# Patient Record
Sex: MALE | Race: WHITE | HISPANIC OR LATINO | Employment: FULL TIME | URBAN - METROPOLITAN AREA
[De-identification: names, ages, dates, MRNs, and addresses within clinical notes are randomized per-mention and may not be internally consistent; named-entity substitution may affect disease eponyms.]

---

## 2023-06-20 ENCOUNTER — HOSPITAL ENCOUNTER (INPATIENT)
Facility: MEDICAL CENTER | Age: 48
LOS: 2 days | DRG: 281 | End: 2023-06-23
Attending: STUDENT IN AN ORGANIZED HEALTH CARE EDUCATION/TRAINING PROGRAM | Admitting: STUDENT IN AN ORGANIZED HEALTH CARE EDUCATION/TRAINING PROGRAM
Payer: COMMERCIAL

## 2023-06-20 DIAGNOSIS — I21.4 NSTEMI (NON-ST ELEVATED MYOCARDIAL INFARCTION) (HCC): ICD-10-CM

## 2023-06-20 PROBLEM — R79.89 ELEVATED TROPONIN: Status: ACTIVE | Noted: 2023-06-20

## 2023-06-20 PROCEDURE — G0378 HOSPITAL OBSERVATION PER HR: HCPCS

## 2023-06-20 PROCEDURE — 99291 CRITICAL CARE FIRST HOUR: CPT | Mod: 25 | Performed by: STUDENT IN AN ORGANIZED HEALTH CARE EDUCATION/TRAINING PROGRAM

## 2023-06-20 PROCEDURE — 99406 BEHAV CHNG SMOKING 3-10 MIN: CPT | Performed by: STUDENT IN AN ORGANIZED HEALTH CARE EDUCATION/TRAINING PROGRAM

## 2023-06-21 ENCOUNTER — APPOINTMENT (OUTPATIENT)
Dept: CARDIOLOGY | Facility: MEDICAL CENTER | Age: 48
DRG: 281 | End: 2023-06-21
Attending: HOSPITALIST
Payer: COMMERCIAL

## 2023-06-21 ENCOUNTER — APPOINTMENT (OUTPATIENT)
Dept: CARDIOLOGY | Facility: MEDICAL CENTER | Age: 48
DRG: 281 | End: 2023-06-21
Attending: NURSE PRACTITIONER
Payer: COMMERCIAL

## 2023-06-21 PROBLEM — I47.10 SVT (SUPRAVENTRICULAR TACHYCARDIA) (HCC): Status: ACTIVE | Noted: 2023-06-21

## 2023-06-21 PROBLEM — R79.89 ELEVATED TROPONIN: Status: RESOLVED | Noted: 2023-06-20 | Resolved: 2023-06-21

## 2023-06-21 PROBLEM — R74.01 ELEVATED AST (SGOT): Status: ACTIVE | Noted: 2023-06-21

## 2023-06-21 PROBLEM — I10 PRIMARY HYPERTENSION: Status: ACTIVE | Noted: 2023-06-21

## 2023-06-21 PROBLEM — N17.9 AKI (ACUTE KIDNEY INJURY) (HCC): Status: ACTIVE | Noted: 2023-06-21

## 2023-06-21 PROBLEM — F17.210 CONTINUOUS DEPENDENCE ON CIGARETTE SMOKING: Status: ACTIVE | Noted: 2023-06-21

## 2023-06-21 PROBLEM — I21.4 NSTEMI (NON-ST ELEVATED MYOCARDIAL INFARCTION) (HCC): Status: ACTIVE | Noted: 2023-06-21

## 2023-06-21 PROBLEM — R94.4 DECREASED GFR: Status: ACTIVE | Noted: 2023-06-21

## 2023-06-21 LAB
ALBUMIN SERPL BCP-MCNC: 3.7 G/DL (ref 3.2–4.9)
ALBUMIN/GLOB SERPL: 1.4 G/DL
ALP SERPL-CCNC: 99 U/L (ref 30–99)
ALT SERPL-CCNC: 29 U/L (ref 2–50)
ANION GAP SERPL CALC-SCNC: 18 MMOL/L (ref 7–16)
APTT PPP: 30.1 SEC (ref 24.7–36)
AST SERPL-CCNC: 67 U/L (ref 12–45)
BASOPHILS # BLD AUTO: 1 % (ref 0–1.8)
BASOPHILS # BLD: 0.1 K/UL (ref 0–0.12)
BILIRUB SERPL-MCNC: 0.4 MG/DL (ref 0.1–1.5)
BUN SERPL-MCNC: 20 MG/DL (ref 8–22)
CALCIUM ALBUM COR SERPL-MCNC: 8.6 MG/DL (ref 8.5–10.5)
CALCIUM SERPL-MCNC: 8.4 MG/DL (ref 8.5–10.5)
CHLORIDE SERPL-SCNC: 105 MMOL/L (ref 96–112)
CHOLEST SERPL-MCNC: 207 MG/DL (ref 100–199)
CO2 SERPL-SCNC: 20 MMOL/L (ref 20–33)
CREAT SERPL-MCNC: 1.53 MG/DL (ref 0.5–1.4)
EKG IMPRESSION: NORMAL
EOSINOPHIL # BLD AUTO: 0.07 K/UL (ref 0–0.51)
EOSINOPHIL NFR BLD: 0.7 % (ref 0–6.9)
ERYTHROCYTE [DISTWIDTH] IN BLOOD BY AUTOMATED COUNT: 48.5 FL (ref 35.9–50)
GFR SERPLBLD CREATININE-BSD FMLA CKD-EPI: 55 ML/MIN/1.73 M 2
GLOBULIN SER CALC-MCNC: 2.6 G/DL (ref 1.9–3.5)
GLUCOSE SERPL-MCNC: 86 MG/DL (ref 65–99)
HCT VFR BLD AUTO: 44 % (ref 42–52)
HDLC SERPL-MCNC: 41 MG/DL
HGB BLD-MCNC: 14.3 G/DL (ref 14–18)
IMM GRANULOCYTES # BLD AUTO: 0.03 K/UL (ref 0–0.11)
IMM GRANULOCYTES NFR BLD AUTO: 0.3 % (ref 0–0.9)
INR PPP: 1.02 (ref 0.87–1.13)
LACTATE SERPL-SCNC: 1.1 MMOL/L (ref 0.5–2)
LDLC SERPL CALC-MCNC: 137 MG/DL
LV EJECT FRACT  99904: 60
LV EJECT FRACT MOD 2C 99903: 54.03
LV EJECT FRACT MOD 4C 99902: 56.95
LV EJECT FRACT MOD BP 99901: 55.23
LYMPHOCYTES # BLD AUTO: 2.31 K/UL (ref 1–4.8)
LYMPHOCYTES NFR BLD: 24 % (ref 22–41)
MAGNESIUM SERPL-MCNC: 1.9 MG/DL (ref 1.5–2.5)
MCH RBC QN AUTO: 31.8 PG (ref 27–33)
MCHC RBC AUTO-ENTMCNC: 32.5 G/DL (ref 32.3–36.5)
MCV RBC AUTO: 98 FL (ref 81.4–97.8)
MONOCYTES # BLD AUTO: 0.66 K/UL (ref 0–0.85)
MONOCYTES NFR BLD AUTO: 6.8 % (ref 0–13.4)
NEUTROPHILS # BLD AUTO: 6.47 K/UL (ref 1.82–7.42)
NEUTROPHILS NFR BLD: 67.2 % (ref 44–72)
NRBC # BLD AUTO: 0 K/UL
NRBC BLD-RTO: 0 /100 WBC (ref 0–0.2)
PLATELET # BLD AUTO: 200 K/UL (ref 164–446)
PMV BLD AUTO: 10.6 FL (ref 9–12.9)
POTASSIUM SERPL-SCNC: 3.6 MMOL/L (ref 3.6–5.5)
PROT SERPL-MCNC: 6.3 G/DL (ref 6–8.2)
PROTHROMBIN TIME: 13.3 SEC (ref 12–14.6)
RBC # BLD AUTO: 4.49 M/UL (ref 4.7–6.1)
SODIUM SERPL-SCNC: 143 MMOL/L (ref 135–145)
T4 FREE SERPL-MCNC: 1.18 NG/DL (ref 0.93–1.7)
TRIGL SERPL-MCNC: 143 MG/DL (ref 0–149)
TROPONIN T SERPL-MCNC: 372 NG/L (ref 6–19)
TROPONIN T SERPL-MCNC: 578 NG/L (ref 6–19)
TROPONIN T SERPL-MCNC: 624 NG/L (ref 6–19)
TROPONIN T SERPL-MCNC: 688 NG/L (ref 6–19)
TSH SERPL DL<=0.005 MIU/L-ACNC: 1.6 UIU/ML (ref 0.38–5.33)
UFH PPP CHRO-ACNC: 0.2 IU/ML
UFH PPP CHRO-ACNC: 0.31 IU/ML
UFH PPP CHRO-ACNC: <0.1 IU/ML
UFH PPP CHRO-ACNC: <0.1 IU/ML
WBC # BLD AUTO: 9.6 K/UL (ref 4.8–10.8)

## 2023-06-21 PROCEDURE — 36415 COLL VENOUS BLD VENIPUNCTURE: CPT

## 2023-06-21 PROCEDURE — 700102 HCHG RX REV CODE 250 W/ 637 OVERRIDE(OP): Performed by: HOSPITALIST

## 2023-06-21 PROCEDURE — 80061 LIPID PANEL: CPT

## 2023-06-21 PROCEDURE — A9270 NON-COVERED ITEM OR SERVICE: HCPCS | Performed by: INTERNAL MEDICINE

## 2023-06-21 PROCEDURE — 85520 HEPARIN ASSAY: CPT | Mod: 91

## 2023-06-21 PROCEDURE — 93005 ELECTROCARDIOGRAM TRACING: CPT | Performed by: HOSPITALIST

## 2023-06-21 PROCEDURE — 85025 COMPLETE CBC W/AUTO DIFF WBC: CPT

## 2023-06-21 PROCEDURE — 99223 1ST HOSP IP/OBS HIGH 75: CPT | Performed by: INTERNAL MEDICINE

## 2023-06-21 PROCEDURE — 770020 HCHG ROOM/CARE - TELE (206)

## 2023-06-21 PROCEDURE — 700102 HCHG RX REV CODE 250 W/ 637 OVERRIDE(OP): Performed by: NURSE PRACTITIONER

## 2023-06-21 PROCEDURE — 700102 HCHG RX REV CODE 250 W/ 637 OVERRIDE(OP): Performed by: INTERNAL MEDICINE

## 2023-06-21 PROCEDURE — 85610 PROTHROMBIN TIME: CPT

## 2023-06-21 PROCEDURE — 93306 TTE W/DOPPLER COMPLETE: CPT | Mod: 26 | Performed by: INTERNAL MEDICINE

## 2023-06-21 PROCEDURE — 84439 ASSAY OF FREE THYROXINE: CPT

## 2023-06-21 PROCEDURE — 700117 HCHG RX CONTRAST REV CODE 255: Performed by: HOSPITALIST

## 2023-06-21 PROCEDURE — 83735 ASSAY OF MAGNESIUM: CPT

## 2023-06-21 PROCEDURE — 84484 ASSAY OF TROPONIN QUANT: CPT | Mod: 91

## 2023-06-21 PROCEDURE — A9270 NON-COVERED ITEM OR SERVICE: HCPCS | Performed by: HOSPITALIST

## 2023-06-21 PROCEDURE — 83605 ASSAY OF LACTIC ACID: CPT

## 2023-06-21 PROCEDURE — 80053 COMPREHEN METABOLIC PANEL: CPT

## 2023-06-21 PROCEDURE — 99233 SBSQ HOSP IP/OBS HIGH 50: CPT | Performed by: INTERNAL MEDICINE

## 2023-06-21 PROCEDURE — 700111 HCHG RX REV CODE 636 W/ 250 OVERRIDE (IP): Performed by: HOSPITALIST

## 2023-06-21 PROCEDURE — A9270 NON-COVERED ITEM OR SERVICE: HCPCS | Performed by: NURSE PRACTITIONER

## 2023-06-21 PROCEDURE — 93010 ELECTROCARDIOGRAM REPORT: CPT | Performed by: INTERNAL MEDICINE

## 2023-06-21 PROCEDURE — 93306 TTE W/DOPPLER COMPLETE: CPT

## 2023-06-21 PROCEDURE — 96374 THER/PROPH/DIAG INJ IV PUSH: CPT

## 2023-06-21 PROCEDURE — 84443 ASSAY THYROID STIM HORMONE: CPT

## 2023-06-21 PROCEDURE — 85730 THROMBOPLASTIN TIME PARTIAL: CPT

## 2023-06-21 PROCEDURE — 96376 TX/PRO/DX INJ SAME DRUG ADON: CPT

## 2023-06-21 RX ORDER — ASPIRIN 325 MG
325 TABLET ORAL ONCE
Status: COMPLETED | OUTPATIENT
Start: 2023-06-21 | End: 2023-06-21

## 2023-06-21 RX ORDER — LOSARTAN POTASSIUM 25 MG/1
25 TABLET ORAL
Status: DISCONTINUED | OUTPATIENT
Start: 2023-06-21 | End: 2023-06-23 | Stop reason: HOSPADM

## 2023-06-21 RX ORDER — AMLODIPINE BESYLATE 10 MG/1
10 TABLET ORAL
Status: DISCONTINUED | OUTPATIENT
Start: 2023-06-21 | End: 2023-06-23 | Stop reason: HOSPADM

## 2023-06-21 RX ORDER — GABAPENTIN 300 MG/1
300 CAPSULE ORAL
COMMUNITY

## 2023-06-21 RX ORDER — CHOLECALCIFEROL (VITAMIN D3) 125 MCG
5 CAPSULE ORAL NIGHTLY PRN
Status: DISCONTINUED | OUTPATIENT
Start: 2023-06-21 | End: 2023-06-23 | Stop reason: HOSPADM

## 2023-06-21 RX ORDER — CARVEDILOL 6.25 MG/1
6.25 TABLET ORAL 2 TIMES DAILY WITH MEALS
Status: DISCONTINUED | OUTPATIENT
Start: 2023-06-21 | End: 2023-06-23 | Stop reason: HOSPADM

## 2023-06-21 RX ORDER — HEPARIN SODIUM 1000 [USP'U]/ML
4000 INJECTION, SOLUTION INTRAVENOUS; SUBCUTANEOUS ONCE
Status: COMPLETED | OUTPATIENT
Start: 2023-06-21 | End: 2023-06-21

## 2023-06-21 RX ORDER — ASPIRIN 81 MG/1
81 TABLET ORAL DAILY
Status: ON HOLD | COMMUNITY
End: 2023-06-23 | Stop reason: SDUPTHER

## 2023-06-21 RX ORDER — BISACODYL 10 MG
10 SUPPOSITORY, RECTAL RECTAL
Status: DISCONTINUED | OUTPATIENT
Start: 2023-06-21 | End: 2023-06-23 | Stop reason: HOSPADM

## 2023-06-21 RX ORDER — HEPARIN SODIUM 1000 [USP'U]/ML
2000 INJECTION, SOLUTION INTRAVENOUS; SUBCUTANEOUS PRN
Status: DISCONTINUED | OUTPATIENT
Start: 2023-06-21 | End: 2023-06-22

## 2023-06-21 RX ORDER — BACLOFEN 10 MG/1
10 TABLET ORAL
Status: ON HOLD | COMMUNITY
End: 2023-06-23

## 2023-06-21 RX ORDER — CARVEDILOL 3.12 MG/1
3.12 TABLET ORAL ONCE
Status: COMPLETED | OUTPATIENT
Start: 2023-06-21 | End: 2023-06-21

## 2023-06-21 RX ORDER — AMOXICILLIN 250 MG
2 CAPSULE ORAL 2 TIMES DAILY
Status: DISCONTINUED | OUTPATIENT
Start: 2023-06-21 | End: 2023-06-23 | Stop reason: HOSPADM

## 2023-06-21 RX ORDER — POLYETHYLENE GLYCOL 3350 17 G/17G
1 POWDER, FOR SOLUTION ORAL
Status: DISCONTINUED | OUTPATIENT
Start: 2023-06-21 | End: 2023-06-23 | Stop reason: HOSPADM

## 2023-06-21 RX ORDER — ASPIRIN 81 MG/1
81 TABLET ORAL DAILY
Status: DISCONTINUED | OUTPATIENT
Start: 2023-06-22 | End: 2023-06-23 | Stop reason: HOSPADM

## 2023-06-21 RX ORDER — SODIUM CHLORIDE 9 MG/ML
INJECTION, SOLUTION INTRAVENOUS CONTINUOUS
Status: CANCELLED | OUTPATIENT
Start: 2023-06-21

## 2023-06-21 RX ORDER — ATORVASTATIN CALCIUM 40 MG/1
40 TABLET, FILM COATED ORAL EVERY EVENING
Status: DISCONTINUED | OUTPATIENT
Start: 2023-06-21 | End: 2023-06-23 | Stop reason: HOSPADM

## 2023-06-21 RX ORDER — NITROGLYCERIN 0.4 MG/1
0.4 TABLET SUBLINGUAL
Status: DISCONTINUED | OUTPATIENT
Start: 2023-06-21 | End: 2023-06-23 | Stop reason: HOSPADM

## 2023-06-21 RX ORDER — HEPARIN SODIUM 5000 [USP'U]/100ML
0-30 INJECTION, SOLUTION INTRAVENOUS CONTINUOUS
Status: DISCONTINUED | OUTPATIENT
Start: 2023-06-21 | End: 2023-06-22

## 2023-06-21 RX ORDER — CHOLECALCIFEROL (VITAMIN D3) 125 MCG
5 CAPSULE ORAL NIGHTLY
Status: DISCONTINUED | OUTPATIENT
Start: 2023-06-21 | End: 2023-06-23 | Stop reason: HOSPADM

## 2023-06-21 RX ORDER — AMLODIPINE BESYLATE 5 MG/1
5 TABLET ORAL EVERY MORNING
Status: ON HOLD | COMMUNITY
End: 2023-06-23

## 2023-06-21 RX ORDER — LOSARTAN POTASSIUM 50 MG/1
50 TABLET ORAL
Status: ON HOLD | COMMUNITY
End: 2023-06-23 | Stop reason: SDUPTHER

## 2023-06-21 RX ADMIN — HEPARIN SODIUM 4000 UNITS: 1000 INJECTION, SOLUTION INTRAVENOUS; SUBCUTANEOUS at 02:33

## 2023-06-21 RX ADMIN — ATORVASTATIN CALCIUM 40 MG: 40 TABLET, FILM COATED ORAL at 17:19

## 2023-06-21 RX ADMIN — CARVEDILOL 6.25 MG: 6.25 TABLET, FILM COATED ORAL at 17:19

## 2023-06-21 RX ADMIN — AMLODIPINE BESYLATE 10 MG: 10 TABLET ORAL at 13:37

## 2023-06-21 RX ADMIN — CARVEDILOL 3.12 MG: 3.12 TABLET, FILM COATED ORAL at 02:07

## 2023-06-21 RX ADMIN — LOSARTAN POTASSIUM 25 MG: 25 TABLET, FILM COATED ORAL at 05:43

## 2023-06-21 RX ADMIN — HEPARIN SODIUM 12 UNITS/KG/HR: 5000 INJECTION, SOLUTION INTRAVENOUS at 02:37

## 2023-06-21 RX ADMIN — Medication 5 MG: at 21:49

## 2023-06-21 RX ADMIN — HEPARIN SODIUM 18 UNITS/KG/HR: 5000 INJECTION, SOLUTION INTRAVENOUS at 21:40

## 2023-06-21 RX ADMIN — Medication 5 MG: at 02:07

## 2023-06-21 RX ADMIN — HEPARIN SODIUM 2000 UNITS: 1000 INJECTION, SOLUTION INTRAVENOUS; SUBCUTANEOUS at 09:52

## 2023-06-21 RX ADMIN — HEPARIN SODIUM 2000 UNITS: 1000 INJECTION, SOLUTION INTRAVENOUS; SUBCUTANEOUS at 16:52

## 2023-06-21 RX ADMIN — CARVEDILOL 6.25 MG: 6.25 TABLET, FILM COATED ORAL at 11:33

## 2023-06-21 RX ADMIN — ASPIRIN 325 MG: 325 TABLET ORAL at 02:07

## 2023-06-21 RX ADMIN — HUMAN ALBUMIN MICROSPHERES AND PERFLUTREN 3 ML: 10; .22 INJECTION, SOLUTION INTRAVENOUS at 11:00

## 2023-06-21 ASSESSMENT — ENCOUNTER SYMPTOMS
BLURRED VISION: 0
DOUBLE VISION: 0
DIZZINESS: 1
FALLS: 0
PALPITATIONS: 1
WEIGHT LOSS: 1
MYALGIAS: 0
VOMITING: 0
NAUSEA: 0
SHORTNESS OF BREATH: 1

## 2023-06-21 ASSESSMENT — COGNITIVE AND FUNCTIONAL STATUS - GENERAL
MOBILITY SCORE: 24
SUGGESTED CMS G CODE MODIFIER MOBILITY: CH
SUGGESTED CMS G CODE MODIFIER DAILY ACTIVITY: CH
DAILY ACTIVITIY SCORE: 24

## 2023-06-21 ASSESSMENT — LIFESTYLE VARIABLES
CONSUMPTION TOTAL: POSITIVE
SUBSTANCE_ABUSE: 1
EVER FELT BAD OR GUILTY ABOUT YOUR DRINKING: NO
HAVE YOU EVER FELT YOU SHOULD CUT DOWN ON YOUR DRINKING: YES
ALCOHOL_USE: YES
TOTAL SCORE: 1
DOES PATIENT WANT TO STOP DRINKING: NO
TOTAL SCORE: 1
AVERAGE NUMBER OF DAYS PER WEEK YOU HAVE A DRINK CONTAINING ALCOHOL: 20
HAVE PEOPLE ANNOYED YOU BY CRITICIZING YOUR DRINKING: NO
TOTAL SCORE: 1
HOW MANY TIMES IN THE PAST YEAR HAVE YOU HAD 5 OR MORE DRINKS IN A DAY: 4
EVER HAD A DRINK FIRST THING IN THE MORNING TO STEADY YOUR NERVES TO GET RID OF A HANGOVER: NO
ON A TYPICAL DAY WHEN YOU DRINK ALCOHOL HOW MANY DRINKS DO YOU HAVE: 2

## 2023-06-21 ASSESSMENT — PATIENT HEALTH QUESTIONNAIRE - PHQ9
5. POOR APPETITE OR OVEREATING: SEVERAL DAYS
2. FEELING DOWN, DEPRESSED, IRRITABLE, OR HOPELESS: MORE THAN HALF THE DAYS
8. MOVING OR SPEAKING SO SLOWLY THAT OTHER PEOPLE COULD HAVE NOTICED. OR THE OPPOSITE, BEING SO FIGETY OR RESTLESS THAT YOU HAVE BEEN MOVING AROUND A LOT MORE THAN USUAL: NOT AT ALL
4. FEELING TIRED OR HAVING LITTLE ENERGY: MORE THAN HALF THE DAYS
3. TROUBLE FALLING OR STAYING ASLEEP OR SLEEPING TOO MUCH: NEARLY EVERY DAY
SUM OF ALL RESPONSES TO PHQ9 QUESTIONS 1 AND 2: 4
6. FEELING BAD ABOUT YOURSELF - OR THAT YOU ARE A FAILURE OR HAVE LET YOURSELF OR YOUR FAMILY DOWN: NOT AL ALL
SUM OF ALL RESPONSES TO PHQ QUESTIONS 1-9: 12
7. TROUBLE CONCENTRATING ON THINGS, SUCH AS READING THE NEWSPAPER OR WATCHING TELEVISION: MORE THAN HALF THE DAYS
1. LITTLE INTEREST OR PLEASURE IN DOING THINGS: MORE THAN HALF THE DAYS
9. THOUGHTS THAT YOU WOULD BE BETTER OFF DEAD, OR OF HURTING YOURSELF: NOT AT ALL

## 2023-06-21 ASSESSMENT — PAIN DESCRIPTION - PAIN TYPE
TYPE: ACUTE PAIN
TYPE: ACUTE PAIN

## 2023-06-21 ASSESSMENT — FIBROSIS 4 INDEX: FIB4 SCORE: 2.99

## 2023-06-21 NOTE — DIETARY
"Nutrition services: Day 0 of admit.  Jethro Cook is a 47 y.o. male with admitting DX of NSTEMI.  Consult received for unsure wt loss (MST 2).     Assessment:  Height: 175.3 cm (5' 9.02\")  Weight: 120 kg (264 lb 15.9 oz)  Body mass index is 39.11 kg/m²., BMI classification: Obese Class II  Diet/Intake: NPO     Evaluation:   RD able to visit pt at bedside. Pt reports being \"starving\". Pt currently NPO, last meal was lunch yesterday. No intake changes prior to admission.   Pt reports a 30 lbs wt loss in 2 months. Pt has been practicing portion control which has led to the wt loss. No wt hx per chart review. Per pt report, pt with a -8.9% wt loss in 2 months, wt loss is significant.   Per NFPE pt is well nourished.   Labs: GFR 55, Ca 8.4  Meds: melatonin, bowel regimen (refused)  +BM PTA    Malnutrition Risk: Pt at risk with significant wt loss however unable to meet multiple criteria at this time.     Recommendations/Plan:   Diet advancement per MD.   Encourage intake of >50%.  Document intake of all PO as % taken in ADL's to provide interdisciplinary communication across all shifts.   Monitor weight.  Nutrition rep will continue to see patient for ongoing meal and snack preferences.     RD following.   "

## 2023-06-21 NOTE — ASSESSMENT & PLAN NOTE
Troponin trending up now at 600  Cont on heparin drip.   I have consulted cardiology and discussed case with Dr. Dobson   Plan is for [facility] today   Cont on Aspirin , ace, BB  Appreciate rec.

## 2023-06-21 NOTE — ASSESSMENT & PLAN NOTE
Has been smoking since age 27, smokes 1 pack every 3 days. Has considered quitting, but reports hallucinations and hiccups with the nicotine patch and gum.   -Tobacco cessation counseling was given as patient has a history of tobacco dependence.  Counseling involved discussing the harmful cardiovascular effects including accelerated atherosclerosis, risk factor for stroke, coronary artery disease and heart attack.    -After discussion, patient is not willing to quit smoking.

## 2023-06-21 NOTE — ASSESSMENT & PLAN NOTE
AST elevated; AST:ALT and a 2:1 pattern elevation  In the setting of continuous alcohol dependence.  Patient reports that he drinks >20 drinks of alcohol a week. Estimates he drinks at least ~2-3 (tall cans) of beer in a single setting.  He is not interested in stopping at this time, says his wife  in 2021 and then his girlfriend  in 2022 and the alcohol helps him cope.   -Alcohol cessation counseling provided patient not interested in quitting at this time  - Continue to monitor AST to ALT ratio, can consider ultrasound of the right upper quadrant should values be increasing in the setting of abdominal pain or other clinical findings

## 2023-06-21 NOTE — PROGRESS NOTES
4 Eyes Skin Assessment Completed by EVA Valenzuela and EVA Rowe.    Head WDL  Ears WDL  Nose WDL  Mouth WDL  Neck WDL  Breast/Chest WDL  Shoulder Blades WDL  Spine WDL  (R) Arm/Elbow/Hand Redness, Blanching, and Scar  (L) Arm/Elbow/Hand Redness, Blanching, and Scar  Abdomen WDL  Groin WDL  Scrotum/Coccyx/Buttocks Redness and Blanching  (R) Leg Scar and Scab  (L) Leg Scar and Scab  (R) Heel/Foot/Toe WDL  (L) Heel/Foot/Toe WDL          Devices In Places Tele Box, Blood Pressure Cuff, Pulse Ox, and Nasal Cannula      Interventions In Place Gray Ear Foams, NC W/Ear Foams, Pillows, Heels Loaded W/Pillows, and Pressure Redistribution Mattress    Possible Skin Injury No    Pictures Uploaded Into Epic N/A  Wound Consult Placed N/A  RN Wound Prevention Protocol Ordered Yes

## 2023-06-21 NOTE — H&P
"[facility] Internal Medicine History & Physical Note    Date of Service  6/20/2023    [facility] Team: Night   Attending: Tomas Carter M.d.  Contact Number: [phone]    Primary Care Physician  No primary care provider on file.    Consultants  None    Specialist Names: Consider cardiology consult pending troponin and echo    Code Status  No Order    Chief Complaint  No chief complaint on file.      History of Presenting Illness (HPI):   Jethro Cook is a 47 y.o. male who presented 6/20/2023 as an outside transfer for cardiology services after being found to have SVT, palpitations, and an elevated troponin. Patient reports that while at work ~10am he felt dizzy and lightheaded. After that he had 5-6/10 chest pain for ~8 hours. Chest pain was non-radiating, occurred while at rest, and was dull in nature. Patient had tylenol and aspirin, but did not notice any significant improvement. He denies exacerbation of chest pain with movement but did experience lightheadedness with movement. He denies any chest pain since arriving at the outside facility or our facility. He denies any symptoms like these in the past. He did have a seizure in October 2022 and lost consciousness; other than Obstructive Sleep Apnea and high blood pressure he has had no medical issues since October. Denies any new medications or supplements. He is on losartan, amlodipine, baclofen, gabapentin, and aspirin. He is not on oxygen at home. Has a CPAP machine at home (has not used the CPAP in the last one month).       At the outside facility the patient received 2 doses of the adenosine for SVT. Per outside records, troponin-I was 1.16.     I discussed the plan of care with  Dr. Carter .    Review of Systems  Review of Systems   Constitutional:  Positive for weight loss (reports \"skipping meals\").   HENT:  Positive for tinnitus (chronic).    Eyes:  Negative for blurred vision and double vision.   Respiratory:  Positive for shortness of breath.    Cardiovascular: "  Positive for chest pain and palpitations.   Gastrointestinal:  Negative for nausea and vomiting.   Genitourinary:  Negative for dysuria and hematuria.   Musculoskeletal:  Negative for falls and myalgias.   Skin:  Negative for rash.   Neurological:  Positive for dizziness.        Reports decreased concentration   Psychiatric/Behavioral:  Positive for substance abuse. Negative for suicidal ideas.        Past Medical History   has no past medical history on file. Hypertension       Surgical History   has no past surgical history on file.     Family History  family history is not on file.   Family history reviewed with patient. Grandmother  of a heart attack at age 60.     Social History  Tobacco: Has been smoking since age 27, smokes 1 pack every 3 days. Has considered quitting, but reports hallucinations and hiccups with the nicotine patch and gum.   Alcohol:  Drinks >20 drinks of alcohol a week. Estimates ~2-3 (tall cans) of beer in a single setting. Not interested in stopping at this time, says his wife  in 2021 and then his girlfriend  in 2022.   Recreational drugs (illegal or prescription):  Used to do methamphetamine (stopped in , after 20 years of use) and cocaine (stopped 25 years ago after 6 months of use)  Employment:  at [employer]  Living Situation: Lives with 17 year old son.   Recent Travel:  None   Primary Care Provider: Not Reviewed  Other (stressors, spirituality, exposures):  Still stressed about losing his girlfriend in 2022 (cirrhosis death) and wife in 2021 (covid death).    Allergies  Not on File    Medications  None       Physical Exam                             Physical Exam  Constitutional:       General: He is not in acute distress.     Appearance: He is ill-appearing. He is not toxic-appearing.   HENT:      Head: Normocephalic and atraumatic.      Nose: No rhinorrhea.   Eyes:      General: No scleral icterus.      Extraocular Movements: Extraocular movements intact.   Cardiovascular:      Rate and Rhythm: Normal rate.      Pulses: Normal pulses.   Pulmonary:      Effort: No respiratory distress.      Breath sounds: No stridor. No wheezing.   Abdominal:      General: Bowel sounds are normal.      Tenderness: There is no guarding.   Musculoskeletal:         General: No tenderness.      Cervical back: Normal range of motion. No rigidity.      Right lower leg: No edema.      Left lower leg: No edema.   Skin:     Coloration: Skin is not jaundiced.   Neurological:      Mental Status: Mental status is at baseline.         Laboratory:          No results for input(s): ALTSGPT, ASTSGOT, ALKPHOSPHAT, TBILIRUBIN, DBILIRUBIN, GAMMAGT, AMYLASE, LIPASE, ALB, PREALBUMIN, GLUCOSE in the last 72 hours.      No results for input(s): NTPROBNP in the last 72 hours.      No results for input(s): TROPONINT in the last 72 hours.    Imaging:  No orders to display       EKG:  Pending    Assessment/Plan:    I anticipate this patient will require at least two midnights for appropriate medical management, necessitating inpatient admission because concern for NSTEMI    Patient will need a Telemetry bed on MEDICAL service.  The need is secondary to Likely NSTEMI.    * NSTEMI (non-ST elevated myocardial infarction) (HCC)  Assessment & Plan  Troponin elevated at 372, in the setting of chest pain  EKG with some ST depression  - Telemetry  - Trend troponins  - Aspirin  - Nitroglycerin as needed (hold in the setting of possible inferior wall infarct)  - Heparin drip  - Carvedilol  - Continue home losartan at a reduced dose titrate as needed  - Echo  - Atorvastatin  - TSH, free T4    Decreased GFR  Assessment & Plan  GFR reduced at 55, creatinine elevated at 1.53  No outside records to determine chronicity and if this is possibly acute renal failure  - Continue to monitor with repeat BMP/CMP  - Consider renally dosing medications if not improving  - Additional  work-up pending outside medical records    Elevated AST (SGOT)  Assessment & Plan  AST elevated; AST:ALT and a 2:1 pattern elevation  In the setting of continuous alcohol dependence.  Patient reports that he drinks >20 drinks of alcohol a week. Estimates he drinks at least ~2-3 (tall cans) of beer in a single setting.  He is not interested in stopping at this time, says his wife  in 2021 and then his girlfriend  in 2022 and the alcohol helps him cope.   -Alcohol cessation counseling provided patient not interested in quitting at this time  - Continue to monitor AST to ALT ratio, can consider ultrasound of the right upper quadrant should values be increasing in the setting of abdominal pain or other clinical findings    Continuous dependence on cigarette smoking  Assessment & Plan  Has been smoking since age 27, smokes 1 pack every 3 days. Has considered quitting, but reports hallucinations and hiccups with the nicotine patch and gum.   -Tobacco cessation counseling was given as patient has a history of tobacco dependence.  Counseling involved discussing the harmful cardiovascular effects including accelerated atherosclerosis, risk factor for stroke, coronary artery disease and heart attack.    -After discussion, patient is not willing to quit smoking.          VTE prophylaxis: SCDs/TEDs and therapeutic anticoagulation with heparin christin Medina MD MPH  PGY-2   [facility] Internal Medicine     Please note that this dictation was created using voice recognition software. I have made every reasonable attempt to correct obvious errors, but I expect that there are errors of grammar and possibly content that I did not discover before finalizing the note.

## 2023-06-21 NOTE — CONSULTS
Cardiology Initial Consult Note    Reason for Consult:  Asked by Dr Xander Gomez M.D. to see this patient with chest pain, NSTEMI  Patient's PCP: No primary care provider on file.    CC: chest pain, lightheadedness      HPI:    This is a 47-year-old man with past medical history significant for hypertension, dyslipidemia, obesity, tobacco use who initially presented to an outside hospital with complaints of chest pain and palpitations.  His symptoms started acutely yesterday.  He initially experienced lightheadedness and then developed associated palpitations and chest pain.  He was found to be in SVT and was given adenosine.  He states that his chest pain was substernal, nonradiating, nonexertional.  It felt like chest tightness.  No associated nausea or vomiting.    Labs on admission were notable for elevated troponin.  Initial troponin was 372 and subsequent repeat increased to 688.  He was started on a heparin drip for NSTEMI and ongoing chest pain.  He is now being seen in cardiac consultation given markedly elevated troponin.    Medications / Drug list prior to admission:  No current facility-administered medications on file prior to encounter.     No current outpatient medications on file prior to encounter.       Current list of administered Medications:    Current Facility-Administered Medications:     losartan (COZAAR) tablet 25 mg, 25 mg, Oral, Q DAY, Valentina Medina M.D., 25 mg at 06/21/23 0543    atorvastatin (LIPITOR) tablet 40 mg, 40 mg, Oral, Q EVENING, Valentina Medina M.D.    [START ON 6/22/2023] aspirin EC tablet 81 mg, 81 mg, Oral, DAILY, Valentina Medina M.D.    heparin infusion 25,000 units in 500 mL 0.45% NACL, 0-30 Units/kg/hr (Adjusted), Intravenous, Continuous, Valentina Medina M.D., Last Rate: 28.9 mL/hr at 06/21/23 0952, 16 Units/kg/hr at 06/21/23 0952    heparin injection 2,000 Units, 2,000 Units, Intravenous, PRN, Valentina Medina M.D., 2,000 Units at  "06/21/23 0952    senna-docusate (PERICOLACE or SENOKOT S) 8.6-50 MG per tablet 2 Tablet, 2 Tablet, Oral, BID **AND** polyethylene glycol/lytes (MIRALAX) PACKET 1 Packet, 1 Packet, Oral, QDAY PRN **AND** magnesium hydroxide (MILK OF MAGNESIA) suspension 30 mL, 30 mL, Oral, QDAY PRN **AND** bisacodyl (DULCOLAX) suppository 10 mg, 10 mg, Rectal, QDAY PRN, Valentina Medina M.D.    melatonin tablet 5 mg, 5 mg, Oral, Nightly, Valentina Medina M.D., 5 mg at 06/21/23 0207    nitroglycerin (NITROSTAT) tablet 0.4 mg, 0.4 mg, Sublingual, Q5 MIN PRN, Valentina Medina M.D.    No past medical history on file.    No past surgical history on file.    No family history on file.  Patient family history was personally reviewed, no pertinent family history to current presentation         ALLERGIES:  Allergies   Allergen Reactions    Lisinopril        Review of systems:  A complete review of symptoms was reviewed with the patient. This is reviewed in H&P and PMH. ALL OTHERS reviewed and negative    Physical exam:  Patient Vitals for the past 24 hrs:   BP Temp Temp src Pulse Resp SpO2 Height Weight   06/21/23 0845 (!) 159/115 36.6 °C (97.9 °F) Temporal 88 18 92 % -- --   06/21/23 0543 133/87 -- -- -- -- -- -- --   06/21/23 0329 (!) 142/96 36.7 °C (98.1 °F) Temporal 86 16 95 % -- --   06/21/23 0207 (!) 127/98 -- -- 91 -- -- -- --   06/21/23 0006 (!) 138/101 36.3 °C (97.3 °F) Temporal 85 16 98 % 1.753 m (5' 9.02\") 120 kg (264 lb 15.9 oz)   06/20/23 2300 (!) 138/101 36.6 °C (97.9 °F) Temporal 98 16 98 % 1.753 m (5' 9\") 120 kg (264 lb 15.9 oz)     General: Not in acute distress, lying comfortably in bed  HEENT: OP clear   Neck:  No carotid bruits, No JVD appreciated  CVS:  RRR, Normal S1, S2. No murmurs, rubs or gallops  Resp: Normal respiratory effort, lungs CTA bilaterally. No rales or rhonchi  Abdomen: Soft, non-distended, non-tender to palpation  Skin: No obvious rashes, no cyanosis  Neurological: Alert and oriented x3, " moves all extremities, no focal neurologic deficits  Extremities:   Extremities warm, 2+ bilateral radial pulses.  2+ bilateral dp pulses, no lower extremity edema bilaterally      Data:  Laboratory studies personally reviewed by me:  Recent Results (from the past 24 hour(s))   CBC WITH DIFFERENTIAL    Collection Time: 06/21/23 12:26 AM   Result Value Ref Range    WBC 9.6 4.8 - 10.8 K/uL    RBC 4.49 (L) 4.70 - 6.10 M/uL    Hemoglobin 14.3 14.0 - 18.0 g/dL    Hematocrit 44.0 42.0 - 52.0 %    MCV 98.0 (H) 81.4 - 97.8 fL    MCH 31.8 27.0 - 33.0 pg    MCHC 32.5 32.3 - 36.5 g/dL    RDW 48.5 35.9 - 50.0 fL    Platelet Count 200 164 - 446 K/uL    MPV 10.6 9.0 - 12.9 fL    Neutrophils-Polys 67.20 44.00 - 72.00 %    Lymphocytes 24.00 22.00 - 41.00 %    Monocytes 6.80 0.00 - 13.40 %    Eosinophils 0.70 0.00 - 6.90 %    Basophils 1.00 0.00 - 1.80 %    Immature Granulocytes 0.30 0.00 - 0.90 %    Nucleated RBC 0.00 0.00 - 0.20 /100 WBC    Neutrophils (Absolute) 6.47 1.82 - 7.42 K/uL    Lymphs (Absolute) 2.31 1.00 - 4.80 K/uL    Monos (Absolute) 0.66 0.00 - 0.85 K/uL    Eos (Absolute) 0.07 0.00 - 0.51 K/uL    Baso (Absolute) 0.10 0.00 - 0.12 K/uL    Immature Granulocytes (abs) 0.03 0.00 - 0.11 K/uL    NRBC (Absolute) 0.00 K/uL   Comp Metabolic Panel    Collection Time: 06/21/23 12:26 AM   Result Value Ref Range    Sodium 143 135 - 145 mmol/L    Potassium 3.6 3.6 - 5.5 mmol/L    Chloride 105 96 - 112 mmol/L    Co2 20 20 - 33 mmol/L    Anion Gap 18.0 (H) 7.0 - 16.0    Glucose 86 65 - 99 mg/dL    Bun 20 8 - 22 mg/dL    Creatinine 1.53 (H) 0.50 - 1.40 mg/dL    Calcium 8.4 (L) 8.5 - 10.5 mg/dL    AST(SGOT) 67 (H) 12 - 45 U/L    ALT(SGPT) 29 2 - 50 U/L    Alkaline Phosphatase 99 30 - 99 U/L    Total Bilirubin 0.4 0.1 - 1.5 mg/dL    Albumin 3.7 3.2 - 4.9 g/dL    Total Protein 6.3 6.0 - 8.2 g/dL    Globulin 2.6 1.9 - 3.5 g/dL    A-G Ratio 1.4 g/dL   MAGNESIUM    Collection Time: 06/21/23 12:26 AM   Result Value Ref Range    Magnesium 1.9  1.5 - 2.5 mg/dL   TROPONIN    Collection Time: 23 12:26 AM   Result Value Ref Range    Troponin T 372 (H) 6 - 19 ng/L   CORRECTED CALCIUM    Collection Time: 23 12:26 AM   Result Value Ref Range    Correct Calcium 8.6 8.5 - 10.5 mg/dL   ESTIMATED GFR    Collection Time: 23 12:26 AM   Result Value Ref Range    GFR (CKD-EPI) 55 (A) >60 mL/min/1.73 m 2   EKG    Collection Time: 23  1:10 AM   Result Value Ref Range    Report       [facility]    Test Date:  2023  Pt Name:    PEREZ MEDEL                 Department: [facility]  MRN:        [id number]                      Room:       T813  Gender:     Male                         Technician: LEATHA  :        1975                   Requested By:KUMAR GASTELUM  Order #:    [id number]                    Reading MD: Bg Camp MD    Measurements  Intervals                                Axis  Rate:       83                           P:          37  NC:         179                          QRS:        42  QRSD:       104                          T:          216  QT:         405  QTc:        476    Interpretive Statements  Sinus rhythm  Probable left atrial enlargement  Abnormal R-wave progression, early transition  Repol abnrm suggests ischemia, anterolateral  No previous ECG available for comparison  Electronically Signed On 2023 9:36:41 PDT by Bg Camp MD     TSH    Collection Time: 23  1:49 AM   Result Value Ref Range    TSH 1.600 0.380 - 5.330 uIU/mL   FREE THYROXINE    Collection Time: 23  1:49 AM   Result Value Ref Range    Free T-4 1.18 0.93 - 1.70 ng/dL   aPTT    Collection Time: 23  1:49 AM   Result Value Ref Range    APTT 30.1 24.7 - 36.0 sec   Prothrombin Time    Collection Time: 23  1:49 AM   Result Value Ref Range    PT 13.3 12.0 - 14.6 sec    INR 1.02 0.87 - 1.13   Heparin Xa (Unfractionated)    Collection Time: 23  1:49 AM   Result Value Ref Range    Heparin Xa (UFH) <0.10 IU/mL    LACTIC ACID    Collection Time: 06/21/23  2:30 AM   Result Value Ref Range    Lactic Acid 1.1 0.5 - 2.0 mmol/L   TROPONIN    Collection Time: 06/21/23  7:01 AM   Result Value Ref Range    Troponin T 688 (H) 6 - 19 ng/L   Lipid Profile    Collection Time: 06/21/23  7:01 AM   Result Value Ref Range    Cholesterol,Tot 207 (H) 100 - 199 mg/dL    Triglycerides 143 0 - 149 mg/dL    HDL 41 >=40 mg/dL     (H) <100 mg/dL   Heparin Anti-Xa    Collection Time: 06/21/23  8:21 AM   Result Value Ref Range    Heparin Xa (UFH) <0.10 IU/mL       Imaging:  EC-ECHOCARDIOGRAM COMPLETE W/O CONT    (Results Pending)         EKG tracings personally reviewed by me shows sinus rhythm with anterolateral ST segment depression, 0.5 mm      All pertinent features of laboratory and imaging reviewed including primary images where applicable      Principal Problem:    NSTEMI (non-ST elevated myocardial infarction) (HCC) (POA: Yes)  Active Problems:    Decreased GFR (POA: Yes)    Continuous dependence on cigarette smoking (POA: Yes)    Elevated AST (SGOT) (POA: Yes)    Primary hypertension (POA: Yes)    SVT (supraventricular tachycardia) (HCC) (POA: Yes)  Resolved Problems:    Elevated troponin (POA: Yes)      Assessment / Plan:  This is a 47-year-old man with past medical history significant for hypertension, dyslipidemia, obesity, tobacco use who initially presented to an outside hospital with complaints of chest pain and palpitations., found to have NSTEMI.    Non-ST elevation myocardial infarction  Paroxysmal SVT, now in sinus rhythm  Hypertension  Dyslipidemia  Tobacco use  Obesity    Recommendations:  Patient has been admitted for chest pain after he was found to have episode of SVT which converted to sinus rhythm with adenosine.  While his troponin elevation may be due to SVT, he has several morbidities including hypertension, dyslipidemia and obesity that increases risk for obstructive CAD.  Degree of troponin elevation consistent  with NSTEMI.  Continue heparin drip for management.  We will plan for cardiac catheterization and coronary angiography for further evaluation and cardiac care.  Keep NPO.  Add on cardiac cath today if schedule allows.  Blood pressure remains elevated.  We will start Coreg 6.25 mg twice daily  Continue losartan 25 mg daily  Continue aspirin and high intensity statin.  Cardiology will continue to follow.    The risks, benefits, and alternatives to coronary angiography with IV sedation were discussed in great detail. Specific risks mentioned include bleeding, infection, kidney damage, allergic reaction, cardiac perforation with possible tamponade requiring melita-cardiocentesis or possible open heart surgery. In addition, we discussed that 10% of patients will experience small to moderate bruising at the side of the arterial puncture. Lastly the risks of heart attack, stroke, and death were discussed; the risks of major complications such as heart attack or stroke caused by the angiogram is less than 1%; the risk of death is approximately 1 in 1000. The patient verbalized understanding of these potential complications and wishes to proceed with this procedure.      I personally discussed his case with  Dr Xander Gomez M.D.      It is my pleasure to participate in the care of Mr. Cook.  Please do not hesitate to contact me with questions or concerns.    Kwasi Dobson MD  Cardiologist, [facility]    6/21/2023    Please note that this dictation was created using voice recognition software. I have made every reasonable attempt to correct obvious errors, but it is possible there are errors of grammar and possibly content that I did not discover before finalizing the note.

## 2023-06-21 NOTE — PROGRESS NOTES
Patient transfer acceptance note :  This 47-year-old male with history of hypertension presents to outside facility ER with palpitations found to be in SVT given adenosine and subsequently converted to sinus rhythm.  Currently does not have any chest pain.  EKG does not show any ST-T wave changes suggestive of ischemia.  Patient given aspirin.  High level of care requested as transferring facility is critical Access does not have cardiology or echocardiogram capability.  Patient accepted to telemetry floor for echo and serial troponin's.

## 2023-06-21 NOTE — PROGRESS NOTES
Assumed care of pt. Bedside report received from RN at [place]. Pt was updated on plan of care. Call light, phone and personal belongings in reach. Bed alarm on and working properly, bed in lowest position, and locked.

## 2023-06-21 NOTE — CARE PLAN
The patient is Watcher - Medium risk of patient condition declining or worsening        Monitor heart rhythm and for chest pain  Progress made toward(s) clinical / shift goals:    Problem: Knowledge Deficit - Standard  Goal: Patient and family/care givers will demonstrate understanding of plan of care, disease process/condition, diagnostic tests and medications   Outcome: Progressing   Provided education about current plan of care.       Patient is not progressing towards the following goals:

## 2023-06-21 NOTE — PROGRESS NOTES
Hospital Medicine Daily Progress Note    Date of Service  6/21/2023    Chief Complaint  Jethro Cook is a 47 y.o. male admitted 6/20/2023 with chest pain     Hospital Course  This is a 47 y.o. male who presented 6/20/2023 as an outside transfer for cardiology services after being found to have SVT, palpitations, and an elevated troponin. Patient reports that while at work ~10am he felt dizzy and lightheaded. After that he had 5-6/10 chest pain for ~8 hours. Chest pain was non-radiating, occurred while at rest, and was dull in nature. Patient had tylenol and aspirin, but did not notice any significant improvement  He was found to have elevated troponin which has been trending up  Patient admitted for further work up and treatment      Interval Problem Update  Patient seen and examined, troponin up to 600 today. I have consulted and discussed case with cardiology. Cont on heparin drip and possible LHC today  Appreciate rec.     I have discussed this patient's plan of care and discharge plan at IDT rounds today with Case Management, Nursing, Nursing leadership, and other members of the IDT team.    Consultants/Specialty  cardiology    Code Status  Full Code    Disposition  The patient is not medically cleared for discharge to home or a post-acute facility.      I have placed the appropriate orders for post-discharge needs.    Review of Systems  Review of Systems   HENT:  Positive for tinnitus (chronic).    Eyes:  Negative for blurred vision and double vision.   Respiratory:  Positive for shortness of breath.    Cardiovascular:  Positive for chest pain and palpitations.   Gastrointestinal:  Negative for nausea and vomiting.   Genitourinary:  Negative for dysuria and hematuria.   Musculoskeletal:  Negative for falls and myalgias.   Skin:  Negative for rash.   Neurological:  Positive for dizziness.        Reports decreased concentration   Psychiatric/Behavioral:  Positive for substance abuse. Negative for suicidal ideas.          Physical Exam  Temp:  [36.3 °C (97.3 °F)-36.7 °C (98.1 °F)] 36.6 °C (97.9 °F)  Pulse:  [85-98] 85  Resp:  [16-18] 16  BP: (127-174)/() 174/122  SpO2:  [92 %-98 %] 93 %    Physical Exam  Constitutional:       General: He is not in acute distress.     Appearance: He is not toxic-appearing.   HENT:      Head: Normocephalic and atraumatic.      Nose: No rhinorrhea.   Eyes:      General: No scleral icterus.     Extraocular Movements: Extraocular movements intact.   Cardiovascular:      Rate and Rhythm: Normal rate.      Pulses: Normal pulses.   Pulmonary:      Effort: No respiratory distress.      Breath sounds: No stridor. No wheezing.   Abdominal:      General: Bowel sounds are normal.      Tenderness: There is no guarding.   Musculoskeletal:         General: No tenderness.      Cervical back: Normal range of motion. No rigidity.      Right lower leg: No edema.      Left lower leg: No edema.   Skin:     Coloration: Skin is not jaundiced.   Neurological:      Mental Status: Mental status is at baseline.         Fluids    Intake/Output Summary (Last 24 hours) at 6/21/2023 1333  Last data filed at 6/21/2023 0600  Gross per 24 hour   Intake 150 ml   Output --   Net 150 ml       Laboratory  Recent Labs     06/21/23  0026   WBC 9.6   RBC 4.49*   HEMOGLOBIN 14.3   HEMATOCRIT 44.0   MCV 98.0*   MCH 31.8   MCHC 32.5   RDW 48.5   PLATELETCT 200   MPV 10.6     Recent Labs     06/21/23  0026   SODIUM 143   POTASSIUM 3.6   CHLORIDE 105   CO2 20   GLUCOSE 86   BUN 20   CREATININE 1.53*   CALCIUM 8.4*     Recent Labs     06/21/23  0149   APTT 30.1   INR 1.02         Recent Labs     06/21/23  0701   TRIGLYCERIDE 143   HDL 41   *       Imaging  EC-ECHOCARDIOGRAM COMPLETE W/ CONT   Final Result      CL-LEFT HEART CATHETERIZATION WITH POSSIBLE INTERVENTION    (Results Pending)        Assessment/Plan  * NSTEMI (non-ST elevated myocardial infarction) (HCC)- (present on admission)  Assessment & Plan  Troponin trending up  now at 600  Cont on heparin drip.   I have consulted cardiology and discussed case with Dr. Dobson   Plan is for [facility]   Cont on Aspirin , ace, BB  Appreciate rec.     SVT (supraventricular tachycardia) (HCC)- (present on admission)  Assessment & Plan  At outside facility, heart rate was in the 170s and improved with adenosine    Primary hypertension- (present on admission)  Assessment & Plan  Continue losartan    Elevated AST (SGOT)- (present on admission)  Assessment & Plan  AST elevated; AST:ALT and a 2:1 pattern elevation  In the setting of continuous alcohol dependence.  Patient reports that he drinks >20 drinks of alcohol a week. Estimates he drinks at least ~2-3 (tall cans) of beer in a single setting.  He is not interested in stopping at this time, says his wife  in 2021 and then his girlfriend  in 2022 and the alcohol helps him cope.   -Alcohol cessation counseling provided patient not interested in quitting at this time  - Continue to monitor AST to ALT ratio, can consider ultrasound of the right upper quadrant should values be increasing in the setting of abdominal pain or other clinical findings    Continuous dependence on cigarette smoking- (present on admission)  Assessment & Plan  Has been smoking since age 27, smokes 1 pack every 3 days. Has considered quitting, but reports hallucinations and hiccups with the nicotine patch and gum.   -Tobacco cessation counseling was given as patient has a history of tobacco dependence.  Counseling involved discussing the harmful cardiovascular effects including accelerated atherosclerosis, risk factor for stroke, coronary artery disease and heart attack.    -After discussion, patient is not willing to quit smoking.      SARAI (acute kidney injury) (HCC)- (present on admission)  Assessment & Plan  GFR reduced at 55, creatinine elevated at 1.53  No outside records to determine chronicity and if this is possibly acute renal failure  -  Continue to monitor with repeat BMP/CMP  - Consider renally dosing medications if not improving  - Additional work-up pending outside medical records         VTE prophylaxis: therapeutic anticoagulation with heparin drip     The very real possibilty of a deterioration of this patient's condition required the highest level of my preparedness for sudden, emergent intervention.  I provided services, which included medication orders, frequent reevaluations of the patient's condition and response to treatment, ordering and reviewing test results, and discussing the case with various consultants.  The time associated with the care of the patient was more then 58  minutes.     I have performed a physical exam and reviewed and updated ROS and Plan today (6/21/2023). In review of yesterday's note (6/20/2023), there are no changes except as documented above.

## 2023-06-21 NOTE — ASSESSMENT & PLAN NOTE
GFR reduced at 55, creatinine elevated at 1.53  No outside records to determine chronicity and if this is possibly acute renal failure  - Continue to monitor with repeat BMP/CMP  - Consider renally dosing medications if not improving  - Additional work-up pending outside medical records    improving

## 2023-06-22 ENCOUNTER — APPOINTMENT (OUTPATIENT)
Dept: CARDIOLOGY | Facility: MEDICAL CENTER | Age: 48
DRG: 281 | End: 2023-06-22
Attending: NURSE PRACTITIONER
Payer: COMMERCIAL

## 2023-06-22 LAB
AMPHET UR QL SCN: NEGATIVE
ANION GAP SERPL CALC-SCNC: 9 MMOL/L (ref 7–16)
BARBITURATES UR QL SCN: NEGATIVE
BENZODIAZ UR QL SCN: NEGATIVE
BUN SERPL-MCNC: 17 MG/DL (ref 8–22)
BZE UR QL SCN: NEGATIVE
CALCIUM SERPL-MCNC: 8.5 MG/DL (ref 8.5–10.5)
CANNABINOIDS UR QL SCN: NEGATIVE
CHLORIDE SERPL-SCNC: 103 MMOL/L (ref 96–112)
CO2 SERPL-SCNC: 25 MMOL/L (ref 20–33)
CREAT SERPL-MCNC: 0.97 MG/DL (ref 0.5–1.4)
ERYTHROCYTE [DISTWIDTH] IN BLOOD BY AUTOMATED COUNT: 46.3 FL (ref 35.9–50)
EST. AVERAGE GLUCOSE BLD GHB EST-MCNC: 114 MG/DL
FENTANYL UR QL: NEGATIVE
GFR SERPLBLD CREATININE-BSD FMLA CKD-EPI: 96 ML/MIN/1.73 M 2
GLUCOSE SERPL-MCNC: 116 MG/DL (ref 65–99)
HBA1C MFR BLD: 5.6 % (ref 4–5.6)
HCT VFR BLD AUTO: 41.6 % (ref 42–52)
HGB BLD-MCNC: 13.8 G/DL (ref 14–18)
MCH RBC QN AUTO: 31.4 PG (ref 27–33)
MCHC RBC AUTO-ENTMCNC: 33.2 G/DL (ref 32.3–36.5)
MCV RBC AUTO: 94.8 FL (ref 81.4–97.8)
METHADONE UR QL SCN: NEGATIVE
OPIATES UR QL SCN: NEGATIVE
OXYCODONE UR QL SCN: NEGATIVE
PCP UR QL SCN: NEGATIVE
PLATELET # BLD AUTO: 229 K/UL (ref 164–446)
PMV BLD AUTO: 10.6 FL (ref 9–12.9)
POTASSIUM SERPL-SCNC: 3.4 MMOL/L (ref 3.6–5.5)
PROPOXYPH UR QL SCN: NEGATIVE
RBC # BLD AUTO: 4.39 M/UL (ref 4.7–6.1)
SODIUM SERPL-SCNC: 137 MMOL/L (ref 135–145)
UFH PPP CHRO-ACNC: 0.18 IU/ML
UFH PPP CHRO-ACNC: 0.26 IU/ML
WBC # BLD AUTO: 6.7 K/UL (ref 4.8–10.8)

## 2023-06-22 PROCEDURE — 99152 MOD SED SAME PHYS/QHP 5/>YRS: CPT

## 2023-06-22 PROCEDURE — B2111ZZ FLUOROSCOPY OF MULTIPLE CORONARY ARTERIES USING LOW OSMOLAR CONTRAST: ICD-10-PCS | Performed by: INTERNAL MEDICINE

## 2023-06-22 PROCEDURE — 770020 HCHG ROOM/CARE - TELE (206)

## 2023-06-22 PROCEDURE — 99232 SBSQ HOSP IP/OBS MODERATE 35: CPT | Performed by: INTERNAL MEDICINE

## 2023-06-22 PROCEDURE — A9270 NON-COVERED ITEM OR SERVICE: HCPCS | Performed by: INTERNAL MEDICINE

## 2023-06-22 PROCEDURE — 700111 HCHG RX REV CODE 636 W/ 250 OVERRIDE (IP)

## 2023-06-22 PROCEDURE — 700101 HCHG RX REV CODE 250

## 2023-06-22 PROCEDURE — 85027 COMPLETE CBC AUTOMATED: CPT

## 2023-06-22 PROCEDURE — B2151ZZ FLUOROSCOPY OF LEFT HEART USING LOW OSMOLAR CONTRAST: ICD-10-PCS | Performed by: INTERNAL MEDICINE

## 2023-06-22 PROCEDURE — 4A023N7 MEASUREMENT OF CARDIAC SAMPLING AND PRESSURE, LEFT HEART, PERCUTANEOUS APPROACH: ICD-10-PCS | Performed by: INTERNAL MEDICINE

## 2023-06-22 PROCEDURE — 700111 HCHG RX REV CODE 636 W/ 250 OVERRIDE (IP): Performed by: HOSPITALIST

## 2023-06-22 PROCEDURE — 36415 COLL VENOUS BLD VENIPUNCTURE: CPT

## 2023-06-22 PROCEDURE — 700105 HCHG RX REV CODE 258: Performed by: INTERNAL MEDICINE

## 2023-06-22 PROCEDURE — 93458 L HRT ARTERY/VENTRICLE ANGIO: CPT | Mod: 26 | Performed by: INTERNAL MEDICINE

## 2023-06-22 PROCEDURE — 80048 BASIC METABOLIC PNL TOTAL CA: CPT

## 2023-06-22 PROCEDURE — 700117 HCHG RX CONTRAST REV CODE 255: Performed by: INTERNAL MEDICINE

## 2023-06-22 PROCEDURE — A9270 NON-COVERED ITEM OR SERVICE: HCPCS | Performed by: HOSPITALIST

## 2023-06-22 PROCEDURE — 83036 HEMOGLOBIN GLYCOSYLATED A1C: CPT

## 2023-06-22 PROCEDURE — 700102 HCHG RX REV CODE 250 W/ 637 OVERRIDE(OP): Performed by: INTERNAL MEDICINE

## 2023-06-22 PROCEDURE — 80307 DRUG TEST PRSMV CHEM ANLYZR: CPT

## 2023-06-22 PROCEDURE — 85520 HEPARIN ASSAY: CPT

## 2023-06-22 PROCEDURE — 99152 MOD SED SAME PHYS/QHP 5/>YRS: CPT | Performed by: INTERNAL MEDICINE

## 2023-06-22 PROCEDURE — 700102 HCHG RX REV CODE 250 W/ 637 OVERRIDE(OP): Performed by: HOSPITALIST

## 2023-06-22 RX ORDER — HEPARIN SODIUM 200 [USP'U]/100ML
INJECTION, SOLUTION INTRAVENOUS
Status: COMPLETED
Start: 2023-06-22 | End: 2023-06-22

## 2023-06-22 RX ORDER — CLOPIDOGREL BISULFATE 75 MG/1
75 TABLET ORAL DAILY
Status: DISCONTINUED | OUTPATIENT
Start: 2023-06-23 | End: 2023-06-23 | Stop reason: HOSPADM

## 2023-06-22 RX ORDER — VERAPAMIL HYDROCHLORIDE 2.5 MG/ML
INJECTION, SOLUTION INTRAVENOUS
Status: COMPLETED
Start: 2023-06-22 | End: 2023-06-22

## 2023-06-22 RX ORDER — MIDAZOLAM HYDROCHLORIDE 1 MG/ML
INJECTION INTRAMUSCULAR; INTRAVENOUS
Status: COMPLETED
Start: 2023-06-22 | End: 2023-06-22

## 2023-06-22 RX ORDER — SODIUM CHLORIDE 9 MG/ML
INJECTION, SOLUTION INTRAVENOUS CONTINUOUS
Status: ACTIVE | OUTPATIENT
Start: 2023-06-22 | End: 2023-06-22

## 2023-06-22 RX ORDER — CLOPIDOGREL BISULFATE 75 MG/1
300 TABLET ORAL ONCE
Status: COMPLETED | OUTPATIENT
Start: 2023-06-22 | End: 2023-06-22

## 2023-06-22 RX ORDER — LIDOCAINE HYDROCHLORIDE 20 MG/ML
INJECTION, SOLUTION INFILTRATION; PERINEURAL
Status: COMPLETED
Start: 2023-06-22 | End: 2023-06-22

## 2023-06-22 RX ORDER — HEPARIN SODIUM 1000 [USP'U]/ML
INJECTION, SOLUTION INTRAVENOUS; SUBCUTANEOUS
Status: COMPLETED
Start: 2023-06-22 | End: 2023-06-22

## 2023-06-22 RX ORDER — POTASSIUM CHLORIDE 20 MEQ/1
40 TABLET, EXTENDED RELEASE ORAL ONCE
Status: COMPLETED | OUTPATIENT
Start: 2023-06-22 | End: 2023-06-22

## 2023-06-22 RX ADMIN — LOSARTAN POTASSIUM 25 MG: 25 TABLET, FILM COATED ORAL at 05:39

## 2023-06-22 RX ADMIN — MIDAZOLAM 1 MG: 1 INJECTION, SOLUTION INTRAMUSCULAR; INTRAVENOUS at 14:00

## 2023-06-22 RX ADMIN — CARVEDILOL 6.25 MG: 6.25 TABLET, FILM COATED ORAL at 17:23

## 2023-06-22 RX ADMIN — MIDAZOLAM 2 MG: 1 INJECTION, SOLUTION INTRAMUSCULAR; INTRAVENOUS at 13:43

## 2023-06-22 RX ADMIN — FENTANYL CITRATE 100 MCG: 50 INJECTION, SOLUTION INTRAMUSCULAR; INTRAVENOUS at 13:43

## 2023-06-22 RX ADMIN — LIDOCAINE HYDROCHLORIDE: 20 INJECTION, SOLUTION INFILTRATION; PERINEURAL at 13:42

## 2023-06-22 RX ADMIN — IOHEXOL 82 ML: 350 INJECTION, SOLUTION INTRAVENOUS at 14:00

## 2023-06-22 RX ADMIN — Medication 5 MG: at 20:30

## 2023-06-22 RX ADMIN — HEPARIN SODIUM 2000 UNITS: 200 INJECTION, SOLUTION INTRAVENOUS at 13:42

## 2023-06-22 RX ADMIN — ASPIRIN 81 MG: 81 TABLET, COATED ORAL at 05:38

## 2023-06-22 RX ADMIN — HEPARIN SODIUM 22 UNITS/KG/HR: 5000 INJECTION, SOLUTION INTRAVENOUS at 12:35

## 2023-06-22 RX ADMIN — HEPARIN SODIUM 5000 UNITS: 1000 INJECTION, SOLUTION INTRAVENOUS; SUBCUTANEOUS at 13:30

## 2023-06-22 RX ADMIN — POTASSIUM CHLORIDE 40 MEQ: 1500 TABLET, EXTENDED RELEASE ORAL at 11:19

## 2023-06-22 RX ADMIN — AMLODIPINE BESYLATE 10 MG: 10 TABLET ORAL at 05:39

## 2023-06-22 RX ADMIN — CLOPIDOGREL BISULFATE 300 MG: 75 TABLET ORAL at 14:32

## 2023-06-22 RX ADMIN — SODIUM CHLORIDE: 9 INJECTION, SOLUTION INTRAVENOUS at 14:35

## 2023-06-22 RX ADMIN — HEPARIN SODIUM 2000 UNITS: 1000 INJECTION, SOLUTION INTRAVENOUS; SUBCUTANEOUS at 03:39

## 2023-06-22 RX ADMIN — ATORVASTATIN CALCIUM 40 MG: 40 TABLET, FILM COATED ORAL at 17:23

## 2023-06-22 RX ADMIN — NICOTINE POLACRILEX 2 MG: 2 GUM, CHEWING BUCCAL at 19:15

## 2023-06-22 RX ADMIN — NITROGLYCERIN 10 ML: 20 INJECTION INTRAVENOUS at 13:43

## 2023-06-22 RX ADMIN — CARVEDILOL 6.25 MG: 6.25 TABLET, FILM COATED ORAL at 07:15

## 2023-06-22 RX ADMIN — HEPARIN SODIUM 2000 UNITS: 1000 INJECTION, SOLUTION INTRAVENOUS; SUBCUTANEOUS at 10:30

## 2023-06-22 RX ADMIN — VERAPAMIL HYDROCHLORIDE 5 MG: 2.5 INJECTION, SOLUTION INTRAVENOUS at 13:42

## 2023-06-22 ASSESSMENT — ENCOUNTER SYMPTOMS
WHEEZING: 0
APNEA: 0
COUGH: 0
CHOKING: 0
CHEST TIGHTNESS: 0
WEIGHT LOSS: 1
SHORTNESS OF BREATH: 0
BLURRED VISION: 0
DIZZINESS: 1
SHORTNESS OF BREATH: 1
VOMITING: 0
NAUSEA: 0
FALLS: 0
MYALGIAS: 0
PALPITATIONS: 1
DOUBLE VISION: 0
STRIDOR: 0

## 2023-06-22 ASSESSMENT — FIBROSIS 4 INDEX: FIB4 SCORE: 2.61

## 2023-06-22 ASSESSMENT — PAIN DESCRIPTION - PAIN TYPE: TYPE: ACUTE PAIN

## 2023-06-22 ASSESSMENT — LIFESTYLE VARIABLES: SUBSTANCE_ABUSE: 1

## 2023-06-22 NOTE — PROGRESS NOTES
Cardiology Follow Up Progress Note    Date of Service  6/22/2023    Attending Physician  Xander Gomez M.D.    Chief Complaint     Chest pain, lightheadedness     HPI  Jethro Cook is a 47 y.o. male admitted 6/20/2023 from outside facility with chest pain and palpitations.  He was found to be in SVT and was given adenosine with good response.    PMH: Hypertension, dyslipidemia, obesity, tobacco use, alcohol use    Interim Events  No overnight cardiac events  Telemetry-SR  Keep n.p.o.  Plan for LHC this afternoon  No recurrent SVT  BP mildly hypertensive  Monitor for alcohol withdrawal  A1C pending    Review of Systems  Review of Systems   Respiratory:  Negative for apnea, cough, choking, chest tightness, shortness of breath, wheezing and stridor.        Vital signs in last 24 hours  Temp:  [36.4 °C (97.5 °F)-36.6 °C (97.9 °F)] 36.5 °C (97.7 °F)  Pulse:  [71-96] 74  Resp:  [16-18] 18  BP: (109-174)/() 139/105  SpO2:  [90 %-96 %] 95 %    Physical Exam  Physical Exam  Cardiovascular:      Rate and Rhythm: Normal rate and regular rhythm.      Pulses: Normal pulses.   Pulmonary:      Effort: Pulmonary effort is normal.   Skin:     General: Skin is warm.   Neurological:      Mental Status: He is alert. Mental status is at baseline.   Psychiatric:         Mood and Affect: Mood normal.         Lab Review  Lab Results   Component Value Date/Time    WBC 6.7 06/22/2023 02:22 AM    RBC 4.39 (L) 06/22/2023 02:22 AM    HEMOGLOBIN 13.8 (L) 06/22/2023 02:22 AM    HEMATOCRIT 41.6 (L) 06/22/2023 02:22 AM    MCV 94.8 06/22/2023 02:22 AM    MCH 31.4 06/22/2023 02:22 AM    MCHC 33.2 06/22/2023 02:22 AM    MPV 10.6 06/22/2023 02:22 AM      Lab Results   Component Value Date/Time    SODIUM 137 06/22/2023 02:22 AM    POTASSIUM 3.4 (L) 06/22/2023 02:22 AM    CHLORIDE 103 06/22/2023 02:22 AM    CO2 25 06/22/2023 02:22 AM    GLUCOSE 116 (H) 06/22/2023 02:22 AM    BUN 17 06/22/2023 02:22 AM    CREATININE 0.97 06/22/2023 02:22 AM      Lab  Results   Component Value Date/Time    ASTSGOT 67 (H) 06/21/2023 12:26 AM    ALTSGPT 29 06/21/2023 12:26 AM     Lab Results   Component Value Date/Time    CHOLSTRLTOT 207 (H) 06/21/2023 07:01 AM     (H) 06/21/2023 07:01 AM    HDL 41 06/21/2023 07:01 AM    TRIGLYCERIDE 143 06/21/2023 07:01 AM    TROPONINT 578 (H) 06/21/2023 08:02 PM       No results for input(s): NTPROBNP in the last 72 hours.    Cardiac Imaging and Procedures Review  EKG:  My personal interpretation of the EKG dated 6/21/2023 is sinus rhythm, ischemia    Echocardiogram:    No prior study is available for comparison.   Normal left ventricular systolic function.  The left ventricular ejection fraction is visually estimated to be 60%.  Mild mitral regurgitation.      Cardiac Catheterization: Pending    Imaging  Chest X-Ray:   Not applicable    Stress Test: Not applicable    Assessment/Plan  #NSTEMI, troponin~688  #Paroxysmal SVT, responded to attendance in  #Tobacco use  #Alcohol abuse  #Obesity  #Hypertension  #  #A1c pending  # LVEF 60%    Recommendations  -Continue IV heparin for management of NSTEMI.  -Plan for [facility] this afternoon.  -Keep n.p.o.  -Continue aspirin 81, atorvastatin 40, carvedilol 6.25 mg BID  , losartan 25 and up titrate for BP control .  -Continue amlodipine 10 for  BP control.  -Replace potassium    Cardiology will follow along    I personally spent a total of 15  minutes which includes face-to-face time and non-face-to-face time spent on preparing to see the patient, reviewing hospital notes and tests, obtaining history from the patient, performing a medically appropriate exam, counseling and educating the patient, ordering medications/tests/procedures/referrals as clinically indicated, and documenting information in the electronic medical record.     Thank you for allowing me to participate in the care of this patient.  I will continue to follow this patient    Please contact me with any questions.    Boom  ARIANA iPerce.   Cardiologist, [facility]  [phone]

## 2023-06-22 NOTE — PROCEDURES
"CARDIAC CATHETERIZATION REPORT    REFERRING: Kwasi Dobson MD    PROCEDURE PHYSICIAN: Bairon Lemos MD, [facility], [facility]  ASSISTANT: None    IMPRESSIONS:  1.  MI with nonobstructive coronary artery disease  2.  Normal LV systolic function  3.  Mild elevation LVEDP at 21 mmHg    Recommendations:  Dual antiplatelet therapy for 12 months    Pre-procedure diagnosis: NSTEMI  Post-procedure diagnosis: Same    Procedure performed  Selective coronary angiography  Left heart catheterization    Conscious sedation was supervised by myself and administered by trained personnel using fentanyl and versed between 1338 and 1354. The patient tolerated sedation without complication.     Procedure Description  1. Access: 5/6 [place] right radial artery Micropuncture technique was utilized following local anesthesia with lidocaine.  A radial cocktail containing verapamil and saline was administered in the radial artery sheath    2. Diagnostic description: The catheter was passed to the central circulation with the aide of J tipped 0.35\" wire. A 5F TIG 4.0, 6F Pigtail, and 6F EBU 3.5 were used to inject the coronary circulation and inject the left ventricle during invasive hemodynamic monitoring.  Additional images of the left coronary system were obtained with EBU 3.5 guide after initial imaging was nondiagnostic.    3. Closing: At completion of the procedure the relevant equipment was removed from the body and hemostasis achieved by Radial band    Findings   Hemodynamics:   Aorta: 133/108 mmHg  LVEDP: 21 mmHg  No significant pullback gradient across the aortic valve    Coronary Anatomy   Left Main: Normal   LAD: Minimal luminal irregularities   LCx: Dominant, minimal luminal irregularities in the AV groove.  The first OM has proximal 40% stenosis, the second OM is small and has proximal 30% stenosis.  The left posterolateral branch is normal, the left-sided PDA has minimal luminal irregularities   RCA: Non-dominant, Minimal luminal " irregularities     Left Ventriculography:   LVEF= 55%.  Normal wall motion.  No sign of mitral regurgitation or aorta enlargement    Technical Factors  1. Complications: None  2. Estimated Blood Loss: <50 cc  3. Specimens: None  4. Contrast Volume: 491 ml  5. Medications: Radial cocktail (Verapamil 2.5 mg, Nitroglycerin 100 mcg) Heparin 5000 Units  6. Radiation (air kerma): 491 mGy

## 2023-06-22 NOTE — CARE PLAN
The patient is Stable - Low risk of patient condition declining or worsening    Shift Goals  Clinical Goals: Cath lab  Patient Goals: Cath lab  Family Goals: LISA    Progress made toward(s) clinical / shift goals:     Problem: Knowledge Deficit - Standard  Goal: Patient and family/care givers will demonstrate understanding of plan of care, disease process/condition, diagnostic tests and medications  Description: Target End Date:  1-3 days or as soon as patient condition allows    Document in Patient Education    1.  Patient and family/caregiver oriented to unit, equipment, visitation policy and means for communicating concern  2.  Complete/review Learning Assessment  3.  Assess knowledge level of disease process/condition, treatment plan, diagnostic tests and medications  4.  Explain disease process/condition, treatment plan, diagnostic tests and medications  Outcome: Progressing     Problem: Depression  Goal: Patient and family/caregiver will verbalize accurate information about at least two of the possible causes of depression, three-four of the signs and symptoms of depression  Description: Target End Date:  1 to 3 days    1.  Assess the patient's and family/caregiver's knowledge regarding depression and its causes.  2.  Explain to the patient and family/caregiver regarding the major symptoms of depression.  3.  Inform the patient and family/caregiver that depression can be treated through medications and psychotherapy.  4.  Allow the patient to express feelings and perceptions  5.  Express hope to the patient with realistic comments about the patient's strengths and resources.  5.  Give positive feedback after a tasks are achieved.  6.  Encourage identification of positive aspects of self.  7.  Educate the patient about crisis intervention services such as suicide hotlines and other resources.  Outcome: Progressing

## 2023-06-22 NOTE — DISCHARGE PLANNING
Care Transition Team Assessment    LMSW conducted initial assessment with pt at bedside, pt oriented x4. Pt states all information on facesheet is correct. [facility] such [facility], transportation through the [employer] to get to appointments, and [facility]. [facility]. Pt reports he is not very sloe to his who older sons and lives with the youngest child who is 17. Pts sister lives by [place].     Pt states he is in the middle of looking into FLAW or light duty at work due to personal life struggles. Pt states that his wife used to work at the [employer] but passed away. Pts girlfriend after his wife also passed away.   Pt states him and his son are going to move into a smaller house due to not needing as much space as they have. Pt reports using [employer] money he received currently.     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 Pt has a CPAP but reports not using it for the last month.     Information Source  Orientation Level: Oriented X4  Information Given By: Patient  Who is responsible for making decisions for patient? : Patient    Readmission Evaluation  Is this a readmission?: No    Elopement Risk  Legal Hold: No  Ambulatory or Self Mobile in Wheelchair: Yes  Disoriented: No  Psychiatric Symptoms: None  History of Wandering: No  Elopement this Admit: No  Vocalizing Wanting to Leave: No  Displays Behaviors, Body Language Wanting to Leave: No-Not at Risk for Elopement  Elopement Risk: Not at Risk for Elopement    Interdisciplinary Discharge Planning  Lives with - Patient's Self Care Capacity: Child Less than 18 Years of Age  Patient or legal guardian wants to designate a caregiver: No  Support Systems: Children  Housing / Facility: 1 Story House  Able to Return to Previous ADL's: Yes  Mobility Issues: No  Prior Services: None  Patient Prefers to be Discharged to:: Home  Assistance Needed: No  Durable Medical Equipment: Not  Applicable    Discharge Preparedness  What is your plan after discharge?: Home with help  What are your discharge supports?: Child, Sibling  Prior Functional Level: Ambulatory, Drives Self, Independent with Activities of Daily Living    Functional Assesment  Prior Functional Level: Ambulatory, Drives Self, Independent with Activities of Daily Living    Vision / Hearing Impairment  Vision Impairment : No  Hearing Impairment : No    Domestic Abuse  Have you ever been the victim of abuse or violence?: No  Physical Abuse or Sexual Abuse: No  Verbal Abuse or Emotional Abuse: No  Possible Abuse/Neglect Reported to:: Not Applicable    Psychological Assessment  History of Substance Abuse: Alcohol    Discharge Risks or Barriers  Discharge risks or barriers?: Mental health  Patient risk factors: Substance abuse, Noncompliance    Anticipated Discharge Information  Discharge Disposition: Discharged to home/self care (01)  Discharge Address: [facility] [address]   [facility] [address]

## 2023-06-22 NOTE — CARE PLAN
The patient is Watcher - Medium risk of patient condition declining or worsening    Shift Goals  Clinical Goals: therapeutic Xa  Patient Goals: rest/ cath in am  Family Goals: elise    Progress made toward(s) clinical / shift goals:    Problem: Knowledge Deficit - Standard  Goal: Patient and family/care givers will demonstrate understanding of plan of care, disease process/condition, diagnostic tests and medications  Outcome: Progressing  Note: White board updated with POC and care team information during bedside report.      Problem: Depression  Goal: Patient and family/caregiver will verbalize accurate information about at least two of the possible causes of depression, three-four of the signs and symptoms of depression  Outcome: Progressing  Note: Pt assessed for signs of depression. Support offered and sleep aid given.

## 2023-06-22 NOTE — PROGRESS NOTES
"Hospital Medicine Daily Progress Note    Date of Service  6/22/2023    Chief Complaint  Jethro Cook is a 47 y.o. male admitted 6/20/2023 with chest pain     Hospital Course  This is a 47 y.o. male who presented 6/20/2023 as an outside transfer for cardiology services after being found to have SVT, palpitations, and an elevated troponin. Patient reports that while at work ~10am he felt dizzy and lightheaded. After that he had 5-6/10 chest pain for ~8 hours. Chest pain was non-radiating, occurred while at rest, and was dull in nature. Patient had tylenol and aspirin, but did not notice any significant improvement  He was found to have elevated troponin which has been trending up  Patient admitted for further work up and treatment      Interval Problem Update  Patient seen and examined, troponin up to 600 today. I have consulted and discussed case with cardiology. Cont on heparin drip and possible LHC today  Appreciate rec.   6/22: Patient resting in bed, cont on heparin drip cardiology following and planing for LHC today appreciate rec.     I have discussed this patient's plan of care and discharge plan at IDT rounds today with Case Management, Nursing, Nursing leadership, and other members of the IDT team.    Consultants/Specialty  cardiology    Code Status  Full Code    Disposition  The patient is not medically cleared for discharge to home or a post-acute facility.      I have placed the appropriate orders for post-discharge needs.    Review of Systems  Review of Systems   Constitutional:  Positive for weight loss (reports \"skipping meals\").   HENT:  Positive for tinnitus (chronic).    Eyes:  Negative for blurred vision and double vision.   Respiratory:  Positive for shortness of breath.    Cardiovascular:  Positive for chest pain and palpitations.   Gastrointestinal:  Negative for nausea and vomiting.   Genitourinary:  Negative for dysuria and hematuria.   Musculoskeletal:  Negative for falls and myalgias. "   Skin:  Negative for rash.   Neurological:  Positive for dizziness.        Reports decreased concentration   Psychiatric/Behavioral:  Positive for substance abuse. Negative for suicidal ideas.         Physical Exam  Temp:  [36.4 °C (97.5 °F)-36.6 °C (97.9 °F)] 36.5 °C (97.7 °F)  Pulse:  [71-96] 79  Resp:  [16-18] 16  BP: (109-147)/() 140/100  SpO2:  [90 %-96 %] 92 %    Physical Exam  Constitutional:       General: He is not in acute distress.     Appearance: He is not toxic-appearing.   HENT:      Head: Normocephalic and atraumatic.      Nose: No rhinorrhea.   Eyes:      General: No scleral icterus.     Extraocular Movements: Extraocular movements intact.   Cardiovascular:      Rate and Rhythm: Normal rate.      Pulses: Normal pulses.   Pulmonary:      Effort: No respiratory distress.      Breath sounds: No stridor. No wheezing.   Abdominal:      General: Bowel sounds are normal.      Tenderness: There is no guarding.   Musculoskeletal:         General: No tenderness.      Cervical back: Normal range of motion. No rigidity.      Right lower leg: No edema.      Left lower leg: No edema.   Skin:     Coloration: Skin is not jaundiced.   Neurological:      Mental Status: Mental status is at baseline.         Fluids  No intake or output data in the 24 hours ending 06/22/23 1329      Laboratory  Recent Labs     06/21/23  0026 06/22/23  0222   WBC 9.6 6.7   RBC 4.49* 4.39*   HEMOGLOBIN 14.3 13.8*   HEMATOCRIT 44.0 41.6*   MCV 98.0* 94.8   MCH 31.8 31.4   MCHC 32.5 33.2   RDW 48.5 46.3   PLATELETCT 200 229   MPV 10.6 10.6     Recent Labs     06/21/23  0026 06/22/23  0222   SODIUM 143 137   POTASSIUM 3.6 3.4*   CHLORIDE 105 103   CO2 20 25   GLUCOSE 86 116*   BUN 20 17   CREATININE 1.53* 0.97   CALCIUM 8.4* 8.5     Recent Labs     06/21/23  0149   APTT 30.1   INR 1.02         Recent Labs     06/21/23  0701   TRIGLYCERIDE 143   HDL 41   *       Imaging  EC-ECHOCARDIOGRAM COMPLETE W/ CONT   Final Result       CL-LEFT HEART CATHETERIZATION WITH POSSIBLE INTERVENTION    (Results Pending)        Assessment/Plan  * NSTEMI (non-ST elevated myocardial infarction) (HCC)- (present on admission)  Assessment & Plan  Troponin trending up now at 600  Cont on heparin drip.   I have consulted cardiology and discussed case with Dr. Dobson   Plan is for [facility] today   Cont on Aspirin , ace, BB  Appreciate rec.     SVT (supraventricular tachycardia) ([facility])- (present on admission)  Assessment & Plan  At outside facility, heart rate was in the 170s and improved with adenosine    Primary hypertension- (present on admission)  Assessment & Plan  Continue losartan    Elevated AST (SGOT)- (present on admission)  Assessment & Plan  AST elevated; AST:ALT and a 2:1 pattern elevation  In the setting of continuous alcohol dependence.  Patient reports that he drinks >20 drinks of alcohol a week. Estimates he drinks at least ~2-3 (tall cans) of beer in a single setting.  He is not interested in stopping at this time, says his wife  in 2021 and then his girlfriend  in 2022 and the alcohol helps him cope.   -Alcohol cessation counseling provided patient not interested in quitting at this time  - Continue to monitor AST to ALT ratio, can consider ultrasound of the right upper quadrant should values be increasing in the setting of abdominal pain or other clinical findings    Continuous dependence on cigarette smoking- (present on admission)  Assessment & Plan  Has been smoking since age 27, smokes 1 pack every 3 days. Has considered quitting, but reports hallucinations and hiccups with the nicotine patch and gum.   -Tobacco cessation counseling was given as patient has a history of tobacco dependence.  Counseling involved discussing the harmful cardiovascular effects including accelerated atherosclerosis, risk factor for stroke, coronary artery disease and heart attack.    -After discussion, patient is not willing to quit  smoking.      SARAI (acute kidney injury) (HCC)- (present on admission)  Assessment & Plan  GFR reduced at 55, creatinine elevated at 1.53  No outside records to determine chronicity and if this is possibly acute renal failure  - Continue to monitor with repeat BMP/CMP  - Consider renally dosing medications if not improving  - Additional work-up pending outside medical records    improving          VTE prophylaxis: therapeutic anticoagulation with heparin drip       I have performed a physical exam and reviewed and updated ROS and Plan today (6/22/2023). In review of yesterday's note (6/21/2023), there are no changes except as documented above.

## 2023-06-22 NOTE — PROGRESS NOTES
Monitor Summary  Rhythm: SR  Rate: 78-89  Ectopy: (F) PVC, PAC, (O)PVC  .14 / .05 / .35

## 2023-06-23 VITALS
OXYGEN SATURATION: 90 % | DIASTOLIC BLOOD PRESSURE: 123 MMHG | TEMPERATURE: 97.8 F | WEIGHT: 265.65 LBS | HEART RATE: 80 BPM | BODY MASS INDEX: 39.35 KG/M2 | SYSTOLIC BLOOD PRESSURE: 173 MMHG | RESPIRATION RATE: 16 BRPM | HEIGHT: 69 IN

## 2023-06-23 LAB
ANION GAP SERPL CALC-SCNC: 10 MMOL/L (ref 7–16)
BUN SERPL-MCNC: 12 MG/DL (ref 8–22)
CALCIUM SERPL-MCNC: 8.4 MG/DL (ref 8.5–10.5)
CHLORIDE SERPL-SCNC: 104 MMOL/L (ref 96–112)
CO2 SERPL-SCNC: 25 MMOL/L (ref 20–33)
CREAT SERPL-MCNC: 0.89 MG/DL (ref 0.5–1.4)
ERYTHROCYTE [DISTWIDTH] IN BLOOD BY AUTOMATED COUNT: 47.4 FL (ref 35.9–50)
GFR SERPLBLD CREATININE-BSD FMLA CKD-EPI: 105 ML/MIN/1.73 M 2
GLUCOSE SERPL-MCNC: 119 MG/DL (ref 65–99)
HCT VFR BLD AUTO: 40.4 % (ref 42–52)
HGB BLD-MCNC: 12.7 G/DL (ref 14–18)
MCH RBC QN AUTO: 30.5 PG (ref 27–33)
MCHC RBC AUTO-ENTMCNC: 31.4 G/DL (ref 32.3–36.5)
MCV RBC AUTO: 97.1 FL (ref 81.4–97.8)
PLATELET # BLD AUTO: 228 K/UL (ref 164–446)
PMV BLD AUTO: 10.7 FL (ref 9–12.9)
POTASSIUM SERPL-SCNC: 3.7 MMOL/L (ref 3.6–5.5)
RBC # BLD AUTO: 4.16 M/UL (ref 4.7–6.1)
SODIUM SERPL-SCNC: 139 MMOL/L (ref 135–145)
WBC # BLD AUTO: 6.9 K/UL (ref 4.8–10.8)

## 2023-06-23 PROCEDURE — A9270 NON-COVERED ITEM OR SERVICE: HCPCS | Performed by: INTERNAL MEDICINE

## 2023-06-23 PROCEDURE — 80048 BASIC METABOLIC PNL TOTAL CA: CPT

## 2023-06-23 PROCEDURE — 700102 HCHG RX REV CODE 250 W/ 637 OVERRIDE(OP): Performed by: INTERNAL MEDICINE

## 2023-06-23 PROCEDURE — 85027 COMPLETE CBC AUTOMATED: CPT

## 2023-06-23 PROCEDURE — 36415 COLL VENOUS BLD VENIPUNCTURE: CPT

## 2023-06-23 PROCEDURE — 99239 HOSP IP/OBS DSCHRG MGMT >30: CPT | Performed by: INTERNAL MEDICINE

## 2023-06-23 PROCEDURE — 700102 HCHG RX REV CODE 250 W/ 637 OVERRIDE(OP): Performed by: HOSPITALIST

## 2023-06-23 PROCEDURE — A9270 NON-COVERED ITEM OR SERVICE: HCPCS | Performed by: HOSPITALIST

## 2023-06-23 RX ORDER — ATORVASTATIN CALCIUM 40 MG/1
40 TABLET, FILM COATED ORAL EVERY EVENING
Qty: 30 TABLET | Refills: 0 | Status: SHIPPED | OUTPATIENT
Start: 2023-06-23 | End: 2023-06-23 | Stop reason: SDUPTHER

## 2023-06-23 RX ORDER — CLOPIDOGREL BISULFATE 75 MG/1
75 TABLET ORAL DAILY
Qty: 30 TABLET | Refills: 0 | Status: SHIPPED | OUTPATIENT
Start: 2023-06-24 | End: 2023-06-23 | Stop reason: SDUPTHER

## 2023-06-23 RX ORDER — AMLODIPINE BESYLATE 10 MG/1
10 TABLET ORAL DAILY
Qty: 30 TABLET | Refills: 0 | Status: SHIPPED | OUTPATIENT
Start: 2023-06-24 | End: 2023-06-23 | Stop reason: SDUPTHER

## 2023-06-23 RX ORDER — CARVEDILOL 6.25 MG/1
6.25 TABLET ORAL 2 TIMES DAILY WITH MEALS
Qty: 60 TABLET | Refills: 0 | Status: SHIPPED | OUTPATIENT
Start: 2023-06-23 | End: 2023-06-23 | Stop reason: SDUPTHER

## 2023-06-23 RX ORDER — ATORVASTATIN CALCIUM 40 MG/1
40 TABLET, FILM COATED ORAL EVERY EVENING
Qty: 30 TABLET | Refills: 0 | Status: SHIPPED | OUTPATIENT
Start: 2023-06-23

## 2023-06-23 RX ORDER — LOSARTAN POTASSIUM 50 MG/1
50 TABLET ORAL
Qty: 30 TABLET | Refills: 0 | Status: SHIPPED | OUTPATIENT
Start: 2023-06-23 | End: 2023-06-23 | Stop reason: SDUPTHER

## 2023-06-23 RX ORDER — ASPIRIN 81 MG/1
81 TABLET ORAL DAILY
Qty: 100 TABLET | Refills: 0 | Status: SHIPPED | OUTPATIENT
Start: 2023-06-23 | End: 2023-06-23 | Stop reason: SDUPTHER

## 2023-06-23 RX ORDER — LOSARTAN POTASSIUM 50 MG/1
50 TABLET ORAL
Qty: 30 TABLET | Refills: 0 | Status: SHIPPED | OUTPATIENT
Start: 2023-06-23

## 2023-06-23 RX ORDER — ASPIRIN 81 MG/1
81 TABLET ORAL DAILY
Qty: 100 TABLET | Refills: 0 | Status: SHIPPED | OUTPATIENT
Start: 2023-06-23

## 2023-06-23 RX ORDER — AMLODIPINE BESYLATE 10 MG/1
10 TABLET ORAL DAILY
Qty: 30 TABLET | Refills: 0 | Status: SHIPPED | OUTPATIENT
Start: 2023-06-24

## 2023-06-23 RX ORDER — CARVEDILOL 6.25 MG/1
6.25 TABLET ORAL 2 TIMES DAILY WITH MEALS
Qty: 60 TABLET | Refills: 0 | Status: SHIPPED | OUTPATIENT
Start: 2023-06-23

## 2023-06-23 RX ORDER — CLOPIDOGREL BISULFATE 75 MG/1
75 TABLET ORAL DAILY
Qty: 30 TABLET | Refills: 0 | Status: SHIPPED | OUTPATIENT
Start: 2023-06-24

## 2023-06-23 RX ADMIN — LOSARTAN POTASSIUM 25 MG: 25 TABLET, FILM COATED ORAL at 06:06

## 2023-06-23 RX ADMIN — CARVEDILOL 6.25 MG: 6.25 TABLET, FILM COATED ORAL at 08:09

## 2023-06-23 RX ADMIN — AMLODIPINE BESYLATE 10 MG: 10 TABLET ORAL at 06:06

## 2023-06-23 RX ADMIN — ASPIRIN 81 MG: 81 TABLET, COATED ORAL at 06:06

## 2023-06-23 RX ADMIN — CLOPIDOGREL BISULFATE 75 MG: 75 TABLET ORAL at 06:06

## 2023-06-23 ASSESSMENT — PAIN DESCRIPTION - PAIN TYPE: TYPE: ACUTE PAIN

## 2023-06-23 NOTE — DISCHARGE PLANNING
Case Management Discharge Planning    Admission Date: 6/20/2023  GMLOS: 2.4  ALOS: 2    6-Clicks ADL Score: 24  6-Clicks Mobility Score: 24      Anticipated Discharge Dispo: Discharge Disposition: Discharged to home/self care (01)  Discharge Address: [address] [facility] [address]    DME Needed: No    Action(s) Taken: LMSW wrote [facility]. Pt is going [facility] to [place]. Pt received MedsToBeds medications and paid out of pocket.     Escalations Completed: None    Medically Clear: Yes    Next Steps: Discharge pt    Barriers to Discharge: None    Is the patient up for discharge tomorrow: No, today.

## 2023-06-23 NOTE — THERAPY
Physical Therapy Contact Note    Patient Name: Jethro Cook  Age:  47 y.o., Sex:  male  Medical Record #: [id number]  Today's Date: 6/23/2023      PT order recevied, per chart pt is a 24/24 6 clicks score and is up in room without assistance or AD. Does not demonstrate acute PT needs at this time, will DC order.    Stella Davis, PT, DPT  [phone]

## 2023-06-23 NOTE — DISCHARGE INSTRUCTIONS
Diagnosis:  Acute Coronary Syndrome (ACS) is a diagnosis that encompasses cardiac-related chest pain and heart attack. ACS occurs when the blood flow to the heart muscle is severely reduced or cut off completely due to a slow process called atherosclerosis.  Atherosclerosis is a disease in which the coronary arteries become narrow from a buildup of fat, cholesterol, and other substances that combine to form plaque. If the plaque breaks, a blood clot will form and block the blood flow to the heart muscle. This lack of blood flow can cause damage or death to the heart muscle which is called a heart attack or Myocardial Infarction (MI). There are two different types of MIs:  ST Elevation Myocardial Infarction or STEMI (the most severe type of heart attack) and Non-ST Elevation Myocardial Infarction or NSTEMI.    Treatment Plan:  Cardiac Diet  - Low fat, low salt, low cholesterol   Cardiac Rehab  - Your doctor has ordered you a referral to [facility].  Call [phone] to schedule an appointment.  Attend my follow-up appointment with my Cardiologist.  Take my medications as prescribed by my doctor  Exercise daily  Quit Smoking, Lower my bad cholesterol and raise my good cholesterol, lower my blood pressure, and Reduce stress    Medications:  Certain medications are used to treat ACS.  Remember to always take medications as prescribed and never stop talking medications unless told by your doctor.    You have been prescribed the following medicatons:    Aspirin - Aspirin is used as a blood thinning medication and you will require this medication indefinitely.  Anti-platelet/blood thinner - Your Anti-platelet/Blood thinning medication is called plavix, and is used in combination with aspirin to prevent clots from forming in your heart and/or around your stent.  Your doctor will determine how long you need to be on this medicine.  Beta-Blocker - Beta-Blocker carvedilol is used to lower blood pressure and heart rate, and/or  helps your heart heal after a heart attack.  Statin - Statin atorvastatin is used to lower cholesterol.

## 2023-06-23 NOTE — DISCHARGE SUMMARY
Discharge Summary    CHIEF COMPLAINT ON ADMISSION  No chief complaint on file.      Reason for Admission  Elevated troponin levels     Admission Date  6/20/2023    CODE STATUS  Prior    HPI & HOSPITAL COURSE  This is a 47 y.o. male who presented 6/20/2023 as an outside transfer for cardiology services after being found to have SVT, palpitations, and an elevated troponin. Patient reports that while at work he felt dizzy and lightheaded. After that he had 5-6/10 chest pain for ~8 hours. Chest pain was non-radiating, occurred while at rest, and was dull in nature. Patient had tylenol and aspirin, but did not notice any significant improvement  He was found to have elevated troponin which has been trending up  Patient admitted for further work up and treatment  His troponin trended up to 600 so cardiology was consulted and patient started on heparin drip.  Patient had left heart cath showed nonobstructive disease. He was continued on coreg, losartan and statin. Patient also started on DAPT as there was concern for MINOCA  Per cardiology will need to follow up as outpatient  Patient now doing better and will be discharged home today     The patient met 2-midnight criteria for an inpatient stay at the time of discharge.    Discharge Date  6/23/2023    FOLLOW UP ITEMS POST DISCHARGE  Cardiology     DISCHARGE DIAGNOSES  Principal Problem:    NSTEMI (non-ST elevated myocardial infarction) (HCC) (POA: Yes)  Active Problems:    SARAI (acute kidney injury) (HCC) (POA: Yes)    Continuous dependence on cigarette smoking (POA: Yes)    Elevated AST (SGOT) (POA: Yes)    Primary hypertension (POA: Yes)    SVT (supraventricular tachycardia) (HCC) (POA: Yes)  Resolved Problems:    Elevated troponin (POA: Yes)      FOLLOW UP  Future Appointments   Date Time Provider Department Center   7/7/2023 12:45 PM RENETTA [place] [facility] None     No follow-up provider specified.    MEDICATIONS ON DISCHARGE     Medication List         START taking these medications        Instructions   atorvastatin 40 MG Tabs  Commonly known as: LIPITOR   Take 1 Tablet by mouth every evening.  Dose: 40 mg     carvedilol 6.25 MG Tabs  Commonly known as: COREG   Take 1 Tablet by mouth 2 times a day with meals.  Dose: 6.25 mg     clopidogrel 75 MG Tabs  Start taking on: June 24, 2023  Commonly known as: PLAVIX   Take 1 Tablet by mouth every day.  Dose: 75 mg            CHANGE how you take these medications        Instructions   amLODIPine 10 MG Tabs  Start taking on: June 24, 2023  What changed:   medication strength  how much to take  when to take this  Commonly known as: NORVASC   Take 1 Tablet by mouth every day.  Dose: 10 mg            CONTINUE taking these medications        Instructions   aspirin 81 MG EC tablet   Take 1 Tablet by mouth every day.  Dose: 81 mg     gabapentin 300 MG Caps  Commonly known as: NEURONTIN   Take 300 mg by mouth at bedtime.  Dose: 300 mg     losartan 50 MG Tabs  Commonly known as: COZAAR   Take 1 Tablet by mouth at bedtime.  Dose: 50 mg     multivitamin Tabs   Take 1 Tablet by mouth every day.  Dose: 1 Tablet     VITAMIN B-12 PO   Take 1 Tablet by mouth every day.  Dose: 1 Tablet     VITAMIN D3 PO   Take 1 Tablet by mouth every day.  Dose: 1 Tablet            STOP taking these medications      baclofen 10 MG Tabs  Commonly known as: LIORESAL              Allergies  Allergies   Allergen Reactions    Lisinopril        DIET  No orders of the defined types were placed in this encounter.      ACTIVITY  As tolerated.  Weight bearing as tolerated    CONSULTATIONS  Cardiology     PROCEDURES  [facility]    LABORATORY  Lab Results   Component Value Date    SODIUM 139 06/23/2023    POTASSIUM 3.7 06/23/2023    CHLORIDE 104 06/23/2023    CO2 25 06/23/2023    GLUCOSE 119 (H) 06/23/2023    BUN 12 06/23/2023    CREATININE 0.89 06/23/2023        Lab Results   Component Value Date    WBC 6.9 06/23/2023    HEMOGLOBIN 12.7 (L) 06/23/2023    HEMATOCRIT  40.4 (L) 06/23/2023    PLATELETCT 228 06/23/2023        Total time of the discharge process exceeds 42 minutes.

## 2023-06-23 NOTE — CARE PLAN
The patient is Watcher - Medium risk of patient condition declining or worsening    Shift Goals  Clinical Goals: hemodynamic stability  Patient Goals: rest  Family Goals: elise    Progress made toward(s) clinical / shift goals:    Problem: Knowledge Deficit - Standard  Goal: Patient and family/care givers will demonstrate understanding of plan of care, disease process/condition, diagnostic tests and medications  Outcome: Progressing  Note: White board updated with POC and care team information during bedside report.      Problem: Depression  Goal: Patient and family/caregiver will verbalize accurate information about at least two of the possible causes of depression, three-four of the signs and symptoms of depression  Outcome: Progressing  Note: Discussed circumstances regarding pt depression. Self care encouraged despite motivation.      Problem: Pain - Standard  Goal: Alleviation of pain or a reduction in pain to the patient’s comfort goal  Outcome: Progressing  Note: Declines need for intervention

## 2023-06-26 ENCOUNTER — TELEPHONE (OUTPATIENT)
Dept: CARDIOLOGY | Facility: MEDICAL CENTER | Age: 48
End: 2023-06-26
Payer: COMMERCIAL

## 2023-06-26 NOTE — TELEPHONE ENCOUNTER
Called pt in regards to upcoming NP appointment. Pt did not answer, LVM. Attempted to verify its pt first time seeing a cardiologist and that all recent labs, notes, and cardiac testing are in pts chart.

## 2023-07-11 ENCOUNTER — TELEPHONE (OUTPATIENT)
Dept: HEALTH INFORMATION MANAGEMENT | Facility: OTHER | Age: 48
End: 2023-07-11
Payer: COMMERCIAL

## 2023-08-25 PROBLEM — N17.9 AKI (ACUTE KIDNEY INJURY) (HCC): Status: RESOLVED | Noted: 2023-06-21 | Resolved: 2023-08-25

## 2023-08-25 PROBLEM — E78.00 PURE HYPERCHOLESTEROLEMIA: Status: ACTIVE | Noted: 2023-08-25

## 2023-08-28 ENCOUNTER — TELEPHONE (OUTPATIENT)
Dept: HEALTH INFORMATION MANAGEMENT | Facility: OTHER | Age: 48
End: 2023-08-28
Payer: COMMERCIAL

## 2023-08-30 ENCOUNTER — TELEPHONE (OUTPATIENT)
Dept: CARDIOLOGY | Facility: MEDICAL CENTER | Age: 48
End: 2023-08-30
Payer: COMMERCIAL

## 2024-01-28 ENCOUNTER — APPOINTMENT (OUTPATIENT)
Dept: RADIOLOGY | Facility: MEDICAL CENTER | Age: 49
End: 2024-01-28
Attending: EMERGENCY MEDICINE
Payer: COMMERCIAL

## 2024-01-28 ENCOUNTER — HOSPITAL ENCOUNTER (EMERGENCY)
Facility: MEDICAL CENTER | Age: 49
End: 2024-01-28
Attending: EMERGENCY MEDICINE
Payer: COMMERCIAL

## 2024-01-28 VITALS
SYSTOLIC BLOOD PRESSURE: 108 MMHG | RESPIRATION RATE: 22 BRPM | TEMPERATURE: 98.1 F | HEIGHT: 69 IN | WEIGHT: 257.28 LBS | HEART RATE: 114 BPM | BODY MASS INDEX: 38.11 KG/M2 | DIASTOLIC BLOOD PRESSURE: 70 MMHG | OXYGEN SATURATION: 93 %

## 2024-01-28 DIAGNOSIS — H21.02 HYPHEMA OF LEFT EYE: ICD-10-CM

## 2024-01-28 DIAGNOSIS — S02.85XA CLOSED FRACTURE OF ORBIT, INITIAL ENCOUNTER (HCC): ICD-10-CM

## 2024-01-28 PROCEDURE — 70486 CT MAXILLOFACIAL W/O DYE: CPT

## 2024-01-28 PROCEDURE — 70450 CT HEAD/BRAIN W/O DYE: CPT

## 2024-01-28 PROCEDURE — 99284 EMERGENCY DEPT VISIT MOD MDM: CPT | Mod: 25

## 2024-01-28 RX ORDER — PREDNISOLONE ACETATE 10 MG/ML
1 SUSPENSION/ DROPS OPHTHALMIC 4 TIMES DAILY
Qty: 5 ML | Refills: 0 | Status: SHIPPED | OUTPATIENT
Start: 2024-01-28 | End: 2024-02-07

## 2024-01-28 RX ORDER — ATROPINE SULFATE 10 MG/ML
1 SOLUTION/ DROPS OPHTHALMIC 4 TIMES DAILY
Qty: 1 ML | Refills: 0 | Status: SHIPPED | OUTPATIENT
Start: 2024-01-28 | End: 2024-02-02

## 2024-01-28 ASSESSMENT — FIBROSIS 4 INDEX: FIB4 SCORE: 2.67

## 2024-01-28 NOTE — ED TRIAGE NOTES
"Chief Complaint   Patient presents with    Facial Swelling     Pt was cutting wood when a part flew off and hit him in the left eye  Pt was seen at a [place] facility but was referred here due to him being on blood thinners with a possible facial fracture  Pt pt bruising and swelling has improved since being seen at other facility      /70   Pulse (!) 114   Temp 36.7 °C (98.1 °F) (Temporal)   Resp (!) 22   Ht 1.753 m (5' 9\")   Wt 117 kg (257 lb 4.4 oz)   SpO2 93%   BMI 37.99 kg/m²     "

## 2024-01-28 NOTE — ED NOTES
ERP notified charge RN that patient was leaving ER in a gown from room 6 to go smoke.  RN saw patient in front of ER and asked him if he would like to see the DR, pt agitated and rude to RN, however did walk back to ER 6.

## 2024-01-28 NOTE — ED NOTES
Patient states he was splitting wood and the log flew back and hit him in the eye. Eye is bruised and swollen. He is on blood thinners.

## 2024-01-28 NOTE — ED NOTES
"Both Eye 20/70    Right Eye 20/70    Left Eye 20/200     Pt used minimal effort for exam with pt stating \"I don't even want to be here\".   "

## 2024-01-28 NOTE — ED PROVIDER NOTES
"ED Provider Note    CHIEF COMPLAINT  Chief Complaint   Patient presents with    Facial Swelling     Pt was cutting wood when a part flew off and hit him in the left eye  Pt was seen at a [place] facility but was referred here due to him being on blood thinners with a possible facial fracture  Pt pt bruising and swelling has improved since being seen at other facility        EXTERNAL RECORDS REVIEWED  Inpatient Notes I reviewed the discharge summary from the patient's admission on June 23, 2023, at that time the patient was admitted for chest pain and PSVT with elevated troponin    HPI/ROS  LIMITATION TO HISTORY   Select: : None  OUTSIDE HISTORIAN(S):  Family Jethro Cook is a 48 y.o. male who presents stating that he was chopping wood and a piece flew back and hit him in the left eye.  There was no loss of consciousness.  Initially he had trouble seeing but now says that his vision is improving.  He denies any focal neurologic deficits.    PAST MEDICAL HISTORY   NSTEMI history, hypertension, SVT history    SURGICAL HISTORY  patient denies any surgical history    FAMILY HISTORY  History reviewed. No pertinent family history.    SOCIAL HISTORY  Social History     Tobacco Use    Smoking status: Every Day     Current packs/day: 1.00     Types: Cigarettes    Smokeless tobacco: Never   Vaping Use    Vaping Use: Some days   Substance and Sexual Activity    Alcohol use: Yes     Alcohol/week: 1.8 oz     Types: 3 Cans of beer per week    Drug use: Not Currently    Sexual activity: Not on file       CURRENT MEDICATIONS  Plavix, aspirin      ALLERGIES  Allergies   Allergen Reactions    Lisinopril        PHYSICAL EXAM  VITAL SIGNS: /70   Pulse (!) 114   Temp 36.7 °C (98.1 °F) (Temporal)   Resp (!) 22   Ht 1.753 m (5' 9\")   Wt 117 kg (257 lb 4.4 oz)   SpO2 93%   BMI 37.99 kg/m²    Constitutional: Alert.  HENT: Soft tissue swelling, ecchymosis around the left eye.  Subconjunctival hemorrhage of the left eye.  " Otherwise his pupils are equal and reactive.  His extraocular muscles are intact. No irregular pupil.  Neck: Normal range of motion, No tenderness, Supple, No stridor.   Lymphatic: No lymphadenopathy noted.   Cardiovascular: Regular rate and rhythm, no murmurs.   Thorax & Lungs: Normal breath sounds, No respiratory distress, No wheezing, No chest tenderness.   Abdomen: Bowel sounds normal, Soft, No tenderness, No peritoneal signs, No masses, No pulsatile masses.   Skin: Warm, Dry, No erythema, No rash.   Back: No bony tenderness, No CVA tenderness.   Extremities: Intact distal pulses, No edema, No tenderness, No cyanosis.  Musculoskeletal: Good range of motion in all major joints. No tenderness to palpation or major deformities noted.   Neurologic: Alert , Normal motor function, Normal sensory function, No focal deficits noted.   Psychiatric: Affect normal, Judgment normal, Mood normal.       DIAGNOSTIC STUDIES / PROCEDURES        RADIOLOGY  I have independently interpreted the diagnostic imaging associated with this visit and am waiting the final reading from the radiologist.   My preliminary interpretation is as follows: No intracranial hemorrhage  Radiologist interpretation:     CT-MAXILLOFACIAL W/O PLUS RECONS   Final Result         1. Orbital blowout fracture with comminuted fractures of the inferior wall of the left orbit.   2. Subcutaneous hematoma in the left forehead and left eyelid.   3. The orbital globes are intact.         CT-HEAD W/O   Final Result         1. No acute intracranial abnormality. No evidence of acute intracranial hemorrhage or mass lesion.                           COURSE & MEDICAL DECISION MAKING    ED Observation Status? No; Patient does not meet criteria for ED Observation.     INITIAL ASSESSMENT, COURSE AND PLAN  Care Narrative:     The patient reports he is on a blood thinner but does not know the name, [facility]  [facility] head was ordered, CT max face was ordered.   Differential diagnosis includes facial fracture, intracranial hemorrhage.    I re-examined the patient, he reported his left eye vision worsened, he developed a hyphema completely filling the anterior chamber of the left eye.     4:11 PM I spoke with Dr. Angel the ophthalmologist.  He has asked me to prescribe atropine 1% Q ID topical drops to the left eye as well as Pred forte 1% 4 times daily left eye.  Not to rub the eye.  I told the patient to keep his head up.  The patient will not stay and has signed out AGAINST MEDICAL ADVICE.  I went out to the [facility] to speak with him again and he would not come in for the instructions I received from Dr. Angel, his daughter did agree to come in and get them. I have asked the patient to follow-up with Dr. Angel tomorrow.  These instructions were given to the daughter to bring to the patient.              ADDITIONAL PROBLEM LIST  Hypertension, PSVT  DISPOSITION AND DISCUSSIONS  I have discussed management of the patient with the following physicians and CAROLA's: I spoke with Dr. Angel who gave instructions about 2 different eyedrops and follow-up in his office tomorrow.  Not to rub the eye.    Discussion of management with other Q or appropriate source(s): Pharmacy was helpful in prescribing the medications, I spoke with Yadi the pharmacist.    Escalation of care considered, and ultimately not performed:after discussion with the patient / family, they have elected to decline an escalation in care    Barriers to care at this time, including but not limited to:  Patient is noncompliant .     Decision tools and prescription drugs considered including, but not limited to: Topical eyedrops for hyphema.     The patient will return for new or worsening symptoms and is stable at the time of discharge.    The patient is referred to a primary physician for blood pressure management, diabetic screening, and for all other preventative health  concerns.      DISPOSITION:  Patient will be discharged home in stable condition.    FOLLOW UP:  [facility], Emergency Dept  [address]  [place] [id number] [phone]    any time any reason    Jakub Angel M.D.  [address]  [place] [address]  [phone]      Follow-up in his office tomorrow, he has an eye specialist      OUTPATIENT MEDICATIONS:  Discharge Medication List as of 1/28/2024  4:09 PM        START taking these medications    Details   atropine 1 % Solution Administer 1 Drop into the left eye 4 times a day for 5 days., Disp-1 mL, R-0, Normal      prednisoLONE acetate (PRED FORTE) 1 % Suspension Administer 1 Drop into the left eye 4 times a day for 10 days., Disp-5 mL, R-0, Normal               I discussed with the patient the risks of their decision to leave without receiving the appropriate medical care. I discussed with the patient the risks of their decision to refuse or withhold consent to receive appropriate medical care. The patient has the capacity to understand the risks and benefits described above. The patient is not intoxicated clinically, the patient's is alert and oriented and able to make a good decision in my opinion. I discussed alternative treatments with the patient. The patient was given discharge instructions and a followup plan as documented in the medical record. I have asked the patient to return at any time to the emergency department for any reason.      FINAL DIAGNOSIS  1. Hyphema of left eye    2. Closed fracture of orbit, initial encounter (HCC)           Electronically signed by: Robby Shukla M.D., 1/28/2024 2:14 PM

## 2024-01-28 NOTE — ED NOTES
"Pt again leaving ER states he is unhappy with Charge RN asking him if he would like to see a DR.  When asked what could we do different, he responded \"I don't want to be here\"   Despite eduction on possible eye vision loss, pt continues to leave ER without signing AMA.  ERP as well spoke with patient discussing possible loss of vision and to return anytime he;d like to be seen.    "

## 2024-01-29 NOTE — ED NOTES
Daughter was provided with follow up care instructions per ERP. Pt left AMA and refused to sign paper work.

## 2024-10-30 ENCOUNTER — APPOINTMENT (OUTPATIENT)
Dept: RADIOLOGY | Facility: MEDICAL CENTER | Age: 49
DRG: 871 | End: 2024-10-30
Payer: COMMERCIAL

## 2024-10-30 ENCOUNTER — APPOINTMENT (OUTPATIENT)
Dept: RADIOLOGY | Facility: MEDICAL CENTER | Age: 49
DRG: 871 | End: 2024-10-30
Attending: EMERGENCY MEDICINE
Payer: COMMERCIAL

## 2024-10-30 ENCOUNTER — APPOINTMENT (OUTPATIENT)
Dept: CARDIOLOGY | Facility: MEDICAL CENTER | Age: 49
DRG: 871 | End: 2024-10-30
Attending: INTERNAL MEDICINE
Payer: COMMERCIAL

## 2024-10-30 ENCOUNTER — HOSPITAL ENCOUNTER (INPATIENT)
Facility: MEDICAL CENTER | Age: 49
End: 2024-10-30
Attending: EMERGENCY MEDICINE | Admitting: INTERNAL MEDICINE
Payer: COMMERCIAL

## 2024-10-30 DIAGNOSIS — F10.930 ALCOHOL WITHDRAWAL SYNDROME WITHOUT COMPLICATION (HCC): ICD-10-CM

## 2024-10-30 DIAGNOSIS — R73.9 HYPERGLYCEMIA: ICD-10-CM

## 2024-10-30 DIAGNOSIS — R74.01 TRANSAMINITIS: ICD-10-CM

## 2024-10-30 DIAGNOSIS — D69.6 THROMBOCYTOPENIA (HCC): ICD-10-CM

## 2024-10-30 DIAGNOSIS — E87.8 ELECTROLYTE ABNORMALITY: ICD-10-CM

## 2024-10-30 DIAGNOSIS — I21.4 NSTEMI (NON-ST ELEVATED MYOCARDIAL INFARCTION) (HCC): ICD-10-CM

## 2024-10-30 DIAGNOSIS — A04.72 C. DIFFICILE DIARRHEA: ICD-10-CM

## 2024-10-30 DIAGNOSIS — E87.1 HYPONATREMIA: ICD-10-CM

## 2024-10-30 DIAGNOSIS — I10 PRIMARY HYPERTENSION: ICD-10-CM

## 2024-10-30 DIAGNOSIS — I47.10 SVT (SUPRAVENTRICULAR TACHYCARDIA) (HCC): ICD-10-CM

## 2024-10-30 DIAGNOSIS — D53.9 MACROCYTIC ANEMIA: ICD-10-CM

## 2024-10-30 DIAGNOSIS — E78.00 PURE HYPERCHOLESTEROLEMIA: ICD-10-CM

## 2024-10-30 DIAGNOSIS — J96.01 ACUTE RESPIRATORY FAILURE WITH HYPOXIA (HCC): ICD-10-CM

## 2024-10-30 DIAGNOSIS — F10.929 ACUTE ALCOHOLIC INTOXICATION WITH COMPLICATION (HCC): ICD-10-CM

## 2024-10-30 DIAGNOSIS — F17.210 CONTINUOUS DEPENDENCE ON CIGARETTE SMOKING: ICD-10-CM

## 2024-10-30 DIAGNOSIS — R57.9 SHOCK (HCC): ICD-10-CM

## 2024-10-30 DIAGNOSIS — G93.40 ACUTE ENCEPHALOPATHY: ICD-10-CM

## 2024-10-30 DIAGNOSIS — E87.20 LACTIC ACIDOSIS: ICD-10-CM

## 2024-10-30 PROBLEM — T68.XXXA HYPOTHERMIA: Status: ACTIVE | Noted: 2024-10-30

## 2024-10-30 LAB
ABO + RH BLD: NORMAL
ABO GROUP BLD: NORMAL
ALBUMIN SERPL BCP-MCNC: 3 G/DL (ref 3.2–4.9)
ALBUMIN/GLOB SERPL: 1.4 G/DL
ALP SERPL-CCNC: 177 U/L (ref 30–99)
ALT SERPL-CCNC: 71 U/L (ref 2–50)
AMMONIA PLAS-SCNC: 18 UMOL/L (ref 11–45)
AMPHET UR QL SCN: NEGATIVE
ANION GAP SERPL CALC-SCNC: 21 MMOL/L (ref 7–16)
ANION GAP SERPL CALC-SCNC: 25 MMOL/L (ref 7–16)
APAP SERPL-MCNC: <5 UG/ML (ref 10–30)
APTT PPP: 33.8 SEC (ref 24.7–36)
ARTERIAL PATENCY WRIST A: ABNORMAL
AST SERPL-CCNC: 139 U/L (ref 12–45)
BACTERIA BLD CULT: NORMAL
BACTERIA BLD CULT: NORMAL
BARBITURATES UR QL SCN: POSITIVE
BASE EXCESS BLDA CALC-SCNC: -8 MMOL/L (ref -4–3)
BASE EXCESS BLDA CALC-SCNC: -9 MMOL/L (ref -4–3)
BENZODIAZ UR QL SCN: POSITIVE
BILIRUB SERPL-MCNC: 0.8 MG/DL (ref 0.1–1.5)
BLD GP AB SCN SERPL QL: NORMAL
BODY TEMPERATURE: ABNORMAL DEGREES
BODY TEMPERATURE: ABNORMAL DEGREES
BREATHS SETTING VENT: 24
BREATHS SETTING VENT: 26
BUN SERPL-MCNC: 8 MG/DL (ref 8–22)
BUN SERPL-MCNC: 8 MG/DL (ref 8–22)
BZE UR QL SCN: NEGATIVE
CA-I SERPL-SCNC: 1 MMOL/L (ref 1.1–1.3)
CALCIUM ALBUM COR SERPL-MCNC: 7.4 MG/DL (ref 8.5–10.5)
CALCIUM SERPL-MCNC: 6.6 MG/DL (ref 8.5–10.5)
CALCIUM SERPL-MCNC: 7.6 MG/DL (ref 8.5–10.5)
CANNABINOIDS UR QL SCN: NEGATIVE
CFT BLD TEG: 5.7 MIN (ref 4.6–9.1)
CFT P HPASE BLD TEG: 5.7 MIN (ref 4.3–8.3)
CHLORIDE SERPL-SCNC: 103 MMOL/L (ref 96–112)
CHLORIDE SERPL-SCNC: 89 MMOL/L (ref 96–112)
CK SERPL-CCNC: 349 U/L (ref 0–154)
CLOT ANGLE BLD TEG: 76.7 DEGREES (ref 63–78)
CO2 BLDA-SCNC: 16 MMOL/L (ref 20–33)
CO2 BLDA-SCNC: 21 MMOL/L (ref 20–33)
CO2 SERPL-SCNC: 16 MMOL/L (ref 20–33)
CO2 SERPL-SCNC: 17 MMOL/L (ref 20–33)
CORTIS SERPL-MCNC: 13.8 UG/DL (ref 0–23)
CREAT SERPL-MCNC: 0.65 MG/DL (ref 0.5–1.4)
CREAT SERPL-MCNC: 0.68 MG/DL (ref 0.5–1.4)
CT.EXTRINSIC BLD ROTEM: 0.9 MIN (ref 0.8–2.1)
DELSYS IDSYS: ABNORMAL
DELSYS IDSYS: ABNORMAL
EKG IMPRESSION: NORMAL
END TIDAL CARBON DIOXIDE IECO2: 18 MMHG
ERYTHROCYTE [DISTWIDTH] IN BLOOD BY AUTOMATED COUNT: 60.3 FL (ref 35.9–50)
EST. AVERAGE GLUCOSE BLD GHB EST-MCNC: 91 MG/DL
ETHANOL BLD-MCNC: 409 MG/DL
FENTANYL UR QL: NEGATIVE
FLUAV RNA SPEC QL NAA+PROBE: NEGATIVE
FLUBV RNA SPEC QL NAA+PROBE: NEGATIVE
GFR SERPLBLD CREATININE-BSD FMLA CKD-EPI: 113 ML/MIN/1.73 M 2
GFR SERPLBLD CREATININE-BSD FMLA CKD-EPI: 115 ML/MIN/1.73 M 2
GLOBULIN SER CALC-MCNC: 2.1 G/DL (ref 1.9–3.5)
GLUCOSE BLD STRIP.AUTO-MCNC: 82 MG/DL (ref 65–99)
GLUCOSE BLD STRIP.AUTO-MCNC: 94 MG/DL (ref 65–99)
GLUCOSE SERPL-MCNC: 611 MG/DL (ref 65–99)
GLUCOSE SERPL-MCNC: 82 MG/DL (ref 65–99)
HBA1C MFR BLD: 4.8 % (ref 4–5.6)
HCO3 BLDA-SCNC: 15.1 MMOL/L (ref 17–25)
HCO3 BLDA-SCNC: 19.6 MMOL/L (ref 17–25)
HCT VFR BLD AUTO: 35 % (ref 42–52)
HGB BLD-MCNC: 11.1 G/DL (ref 14–18)
HOROWITZ INDEX BLDA+IHG-RTO: 115 MM[HG]
HOROWITZ INDEX BLDA+IHG-RTO: 99 MM[HG]
INR PPP: 1.02 (ref 0.87–1.13)
INR PPP: 1.04 (ref 0.87–1.13)
LACTATE SERPL-SCNC: 5.3 MMOL/L (ref 0.5–2)
LACTATE SERPL-SCNC: 7.9 MMOL/L (ref 0.5–2)
LACTATE SERPL-SCNC: 8.5 MMOL/L (ref 0.5–2)
LACTATE SERPL-SCNC: 9 MMOL/L (ref 0.5–2)
LV EJECT FRACT MOD 2C 99903: 59.63
LV EJECT FRACT MOD 4C 99902: 72.22
LV EJECT FRACT MOD BP 99901: 66.74
MAGNESIUM SERPL-MCNC: 1.7 MG/DL (ref 1.5–2.5)
MAGNESIUM SERPL-MCNC: 2.1 MG/DL (ref 1.5–2.5)
MCF BLD TEG: 65.4 MM (ref 52–69)
MCF.PLATELET INHIB BLD ROTEM: 25.7 MM (ref 15–32)
MCH RBC QN AUTO: 34 PG (ref 27–33)
MCHC RBC AUTO-ENTMCNC: 31.7 G/DL (ref 32.3–36.5)
MCV RBC AUTO: 107.4 FL (ref 81.4–97.8)
METHADONE UR QL SCN: NEGATIVE
MODE IMODE: ABNORMAL
MODE IMODE: ABNORMAL
NT-PROBNP SERPL IA-MCNC: <36 PG/ML (ref 0–125)
O2/TOTAL GAS SETTING VFR VENT: 100 %
O2/TOTAL GAS SETTING VFR VENT: 70 %
OPIATES UR QL SCN: NEGATIVE
OXYCODONE UR QL SCN: NEGATIVE
PA AA BLD-ACNC: 70.2 % (ref 0–11)
PA ADP BLD-ACNC: 71.5 % (ref 0–17)
PCO2 BLDA: 27.2 MMHG (ref 26–37)
PCO2 BLDA: 49.3 MMHG (ref 26–37)
PCO2 TEMP ADJ BLDA: 21.3 MMHG (ref 26–37)
PCO2 TEMP ADJ BLDA: 32.5 MMHG (ref 26–37)
PCP UR QL SCN: NEGATIVE
PEEP END EXPIRATORY PRESSURE IPEEP: 10 CMH20
PEEP END EXPIRATORY PRESSURE IPEEP: 8 CMH20
PH BLDA: 7.21 [PH] (ref 7.4–7.5)
PH BLDA: 7.35 [PH] (ref 7.4–7.5)
PH TEMP ADJ BLDA: 7.34 [PH] (ref 7.4–7.5)
PH TEMP ADJ BLDA: 7.43 [PH] (ref 7.4–7.5)
PHOSPHATE SERPL-MCNC: 2.9 MG/DL (ref 2.5–4.5)
PHOSPHATE SERPL-MCNC: 3.1 MG/DL (ref 2.5–4.5)
PLATELET # BLD AUTO: 173 K/UL (ref 164–446)
PMV BLD AUTO: 10.4 FL (ref 9–12.9)
PO2 BLDA: 115 MMHG (ref 64–87)
PO2 BLDA: 69 MMHG (ref 64–87)
PO2 TEMP ADJ BLDA: 48 MMHG (ref 64–87)
PO2 TEMP ADJ BLDA: 69 MMHG (ref 64–87)
POTASSIUM SERPL-SCNC: 2.5 MMOL/L (ref 3.6–5.5)
POTASSIUM SERPL-SCNC: 3.6 MMOL/L (ref 3.6–5.5)
PROCALCITONIN SERPL-MCNC: 0.07 NG/ML
PROCALCITONIN SERPL-MCNC: 0.08 NG/ML
PROPOXYPH UR QL SCN: NEGATIVE
PROT SERPL-MCNC: 5.1 G/DL (ref 6–8.2)
PROTHROMBIN TIME: 13.4 SEC (ref 12–14.6)
PROTHROMBIN TIME: 13.6 SEC (ref 12–14.6)
RBC # BLD AUTO: 3.26 M/UL (ref 4.7–6.1)
RH BLD: NORMAL
RSV RNA SPEC QL NAA+PROBE: NEGATIVE
SALICYLATES SERPL-MCNC: <1 MG/DL (ref 15–25)
SAO2 % BLDA: 93 % (ref 93–99)
SAO2 % BLDA: 97 % (ref 93–99)
SARS-COV-2 RNA RESP QL NAA+PROBE: NOTDETECTED
SIGNIFICANT IND 70042: NORMAL
SIGNIFICANT IND 70042: NORMAL
SITE SITE: NORMAL
SITE SITE: NORMAL
SODIUM SERPL-SCNC: 127 MMOL/L (ref 135–145)
SODIUM SERPL-SCNC: 144 MMOL/L (ref 135–145)
SOURCE SOURCE: NORMAL
SOURCE SOURCE: NORMAL
SPECIMEN DRAWN FROM PATIENT: ABNORMAL
SPECIMEN DRAWN FROM PATIENT: ABNORMAL
TEG ALGORITHM TGALG: ABNORMAL
TIDAL VOLUME IVT: 400 ML
TIDAL VOLUME IVT: 420 ML
TROPONIN T SERPL-MCNC: 25 NG/L (ref 6–19)
WBC # BLD AUTO: 2.3 K/UL (ref 4.8–10.8)

## 2024-10-30 PROCEDURE — 76705 ECHO EXAM OF ABDOMEN: CPT

## 2024-10-30 PROCEDURE — 87040 BLOOD CULTURE FOR BACTERIA: CPT

## 2024-10-30 PROCEDURE — 96367 TX/PROPH/DG ADDL SEQ IV INF: CPT

## 2024-10-30 PROCEDURE — 83735 ASSAY OF MAGNESIUM: CPT

## 2024-10-30 PROCEDURE — 82330 ASSAY OF CALCIUM: CPT | Mod: 91

## 2024-10-30 PROCEDURE — 700111 HCHG RX REV CODE 636 W/ 250 OVERRIDE (IP): Performed by: INTERNAL MEDICINE

## 2024-10-30 PROCEDURE — 72125 CT NECK SPINE W/O DYE: CPT

## 2024-10-30 PROCEDURE — 304538 HCHG NG TUBE

## 2024-10-30 PROCEDURE — 86901 BLOOD TYPING SEROLOGIC RH(D): CPT

## 2024-10-30 PROCEDURE — 303105 HCHG CATHETER EXTRA

## 2024-10-30 PROCEDURE — 36600 WITHDRAWAL OF ARTERIAL BLOOD: CPT

## 2024-10-30 PROCEDURE — 93005 ELECTROCARDIOGRAM TRACING: CPT | Performed by: EMERGENCY MEDICINE

## 2024-10-30 PROCEDURE — 85730 THROMBOPLASTIN TIME PARTIAL: CPT

## 2024-10-30 PROCEDURE — 99291 CRITICAL CARE FIRST HOUR: CPT | Performed by: INTERNAL MEDICINE

## 2024-10-30 PROCEDURE — 85576 BLOOD PLATELET AGGREGATION: CPT | Mod: 91

## 2024-10-30 PROCEDURE — 700117 HCHG RX CONTRAST REV CODE 255: Performed by: INTERNAL MEDICINE

## 2024-10-30 PROCEDURE — 80048 BASIC METABOLIC PNL TOTAL CA: CPT

## 2024-10-30 PROCEDURE — G0390 TRAUMA RESPONS W/HOSP CRITI: HCPCS

## 2024-10-30 PROCEDURE — 700111 HCHG RX REV CODE 636 W/ 250 OVERRIDE (IP): Performed by: EMERGENCY MEDICINE

## 2024-10-30 PROCEDURE — 71260 CT THORAX DX C+: CPT

## 2024-10-30 PROCEDURE — 94799 UNLISTED PULMONARY SVC/PX: CPT

## 2024-10-30 PROCEDURE — 85027 COMPLETE CBC AUTOMATED: CPT

## 2024-10-30 PROCEDURE — 770022 HCHG ROOM/CARE - ICU (200)

## 2024-10-30 PROCEDURE — 99292 CRITICAL CARE ADDL 30 MIN: CPT | Performed by: INTERNAL MEDICINE

## 2024-10-30 PROCEDURE — 82533 TOTAL CORTISOL: CPT

## 2024-10-30 PROCEDURE — 700101 HCHG RX REV CODE 250: Performed by: EMERGENCY MEDICINE

## 2024-10-30 PROCEDURE — 70450 CT HEAD/BRAIN W/O DYE: CPT

## 2024-10-30 PROCEDURE — 94003 VENT MGMT INPAT SUBQ DAY: CPT

## 2024-10-30 PROCEDURE — 99291 CRITICAL CARE FIRST HOUR: CPT

## 2024-10-30 PROCEDURE — 71045 X-RAY EXAM CHEST 1 VIEW: CPT

## 2024-10-30 PROCEDURE — 84100 ASSAY OF PHOSPHORUS: CPT

## 2024-10-30 PROCEDURE — 96368 THER/DIAG CONCURRENT INF: CPT

## 2024-10-30 PROCEDURE — 86850 RBC ANTIBODY SCREEN: CPT

## 2024-10-30 PROCEDURE — 93306 TTE W/DOPPLER COMPLETE: CPT | Mod: 26 | Performed by: INTERNAL MEDICINE

## 2024-10-30 PROCEDURE — 93306 TTE W/DOPPLER COMPLETE: CPT

## 2024-10-30 PROCEDURE — 86900 BLOOD TYPING SEROLOGIC ABO: CPT

## 2024-10-30 PROCEDURE — 80143 DRUG ASSAY ACETAMINOPHEN: CPT

## 2024-10-30 PROCEDURE — 72170 X-RAY EXAM OF PELVIS: CPT

## 2024-10-30 PROCEDURE — 82803 BLOOD GASES ANY COMBINATION: CPT

## 2024-10-30 PROCEDURE — 96365 THER/PROPH/DIAG IV INF INIT: CPT

## 2024-10-30 PROCEDURE — 94002 VENT MGMT INPAT INIT DAY: CPT

## 2024-10-30 PROCEDURE — 82140 ASSAY OF AMMONIA: CPT

## 2024-10-30 PROCEDURE — 02HV33Z INSERTION OF INFUSION DEVICE INTO SUPERIOR VENA CAVA, PERCUTANEOUS APPROACH: ICD-10-PCS | Performed by: EMERGENCY MEDICINE

## 2024-10-30 PROCEDURE — 83036 HEMOGLOBIN GLYCOSYLATED A1C: CPT

## 2024-10-30 PROCEDURE — 72131 CT LUMBAR SPINE W/O DYE: CPT

## 2024-10-30 PROCEDURE — A9270 NON-COVERED ITEM OR SERVICE: HCPCS | Performed by: INTERNAL MEDICINE

## 2024-10-30 PROCEDURE — 83880 ASSAY OF NATRIURETIC PEPTIDE: CPT

## 2024-10-30 PROCEDURE — 70486 CT MAXILLOFACIAL W/O DYE: CPT

## 2024-10-30 PROCEDURE — 82077 ASSAY SPEC XCP UR&BREATH IA: CPT

## 2024-10-30 PROCEDURE — 96366 THER/PROPH/DIAG IV INF ADDON: CPT

## 2024-10-30 PROCEDURE — 72128 CT CHEST SPINE W/O DYE: CPT

## 2024-10-30 PROCEDURE — 36415 COLL VENOUS BLD VENIPUNCTURE: CPT

## 2024-10-30 PROCEDURE — 80307 DRUG TEST PRSMV CHEM ANLYZR: CPT

## 2024-10-30 PROCEDURE — 80179 DRUG ASSAY SALICYLATE: CPT

## 2024-10-30 PROCEDURE — 85347 COAGULATION TIME ACTIVATED: CPT

## 2024-10-30 PROCEDURE — 700105 HCHG RX REV CODE 258: Performed by: EMERGENCY MEDICINE

## 2024-10-30 PROCEDURE — 80053 COMPREHEN METABOLIC PANEL: CPT

## 2024-10-30 PROCEDURE — 700117 HCHG RX CONTRAST REV CODE 255

## 2024-10-30 PROCEDURE — 85610 PROTHROMBIN TIME: CPT | Mod: 91

## 2024-10-30 PROCEDURE — 700105 HCHG RX REV CODE 258: Performed by: INTERNAL MEDICINE

## 2024-10-30 PROCEDURE — 700101 HCHG RX REV CODE 250: Performed by: INTERNAL MEDICINE

## 2024-10-30 PROCEDURE — 82962 GLUCOSE BLOOD TEST: CPT | Mod: 91

## 2024-10-30 PROCEDURE — 700102 HCHG RX REV CODE 250 W/ 637 OVERRIDE(OP): Performed by: INTERNAL MEDICINE

## 2024-10-30 PROCEDURE — 84484 ASSAY OF TROPONIN QUANT: CPT

## 2024-10-30 PROCEDURE — 83605 ASSAY OF LACTIC ACID: CPT | Mod: 91

## 2024-10-30 PROCEDURE — 84145 PROCALCITONIN (PCT): CPT

## 2024-10-30 PROCEDURE — 99221 1ST HOSP IP/OBS SF/LOW 40: CPT | Performed by: SURGERY

## 2024-10-30 PROCEDURE — 87086 URINE CULTURE/COLONY COUNT: CPT

## 2024-10-30 PROCEDURE — 82550 ASSAY OF CK (CPK): CPT

## 2024-10-30 PROCEDURE — 0241U HCHG SARS-COV-2 COVID-19 NFCT DS RESP RNA 4 TRGT ED POC: CPT

## 2024-10-30 PROCEDURE — 51702 INSERT TEMP BLADDER CATH: CPT

## 2024-10-30 PROCEDURE — 93010 ELECTROCARDIOGRAM REPORT: CPT | Performed by: INTERNAL MEDICINE

## 2024-10-30 PROCEDURE — 85384 FIBRINOGEN ACTIVITY: CPT

## 2024-10-30 RX ORDER — FOLIC ACID 1 MG/1
1 TABLET ORAL DAILY
Status: DISCONTINUED | OUTPATIENT
Start: 2024-10-31 | End: 2024-10-30

## 2024-10-30 RX ORDER — PROMETHAZINE HYDROCHLORIDE 25 MG/1
12.5-25 TABLET ORAL EVERY 4 HOURS PRN
Status: DISCONTINUED | OUTPATIENT
Start: 2024-10-30 | End: 2024-10-30

## 2024-10-30 RX ORDER — DEXTROSE MONOHYDRATE 25 G/50ML
25 INJECTION, SOLUTION INTRAVENOUS
Status: DISCONTINUED | OUTPATIENT
Start: 2024-10-30 | End: 2024-10-31

## 2024-10-30 RX ORDER — GAUZE BANDAGE 2" X 2"
100 BANDAGE TOPICAL DAILY
Status: DISCONTINUED | OUTPATIENT
Start: 2024-10-31 | End: 2024-10-31

## 2024-10-30 RX ORDER — MAGNESIUM SULFATE HEPTAHYDRATE 40 MG/ML
2 INJECTION, SOLUTION INTRAVENOUS ONCE
Status: COMPLETED | OUTPATIENT
Start: 2024-10-30 | End: 2024-10-30

## 2024-10-30 RX ORDER — PROCHLORPERAZINE EDISYLATE 5 MG/ML
5-10 INJECTION INTRAMUSCULAR; INTRAVENOUS EVERY 4 HOURS PRN
Status: DISCONTINUED | OUTPATIENT
Start: 2024-10-30 | End: 2024-11-07 | Stop reason: HOSPADM

## 2024-10-30 RX ORDER — ONDANSETRON 4 MG/1
4 TABLET, ORALLY DISINTEGRATING ORAL EVERY 4 HOURS PRN
Status: DISCONTINUED | OUTPATIENT
Start: 2024-10-30 | End: 2024-10-30

## 2024-10-30 RX ORDER — ACETAMINOPHEN 325 MG/1
650 TABLET ORAL EVERY 6 HOURS PRN
Status: DISCONTINUED | OUTPATIENT
Start: 2024-10-30 | End: 2024-10-31

## 2024-10-30 RX ORDER — ONDANSETRON 4 MG/1
4 TABLET, ORALLY DISINTEGRATING ORAL EVERY 4 HOURS PRN
Status: DISCONTINUED | OUTPATIENT
Start: 2024-10-30 | End: 2024-10-31

## 2024-10-30 RX ORDER — LOSARTAN POTASSIUM 100 MG/1
100 TABLET ORAL DAILY
COMMUNITY

## 2024-10-30 RX ORDER — FAMOTIDINE 20 MG/1
20 TABLET, FILM COATED ORAL EVERY 12 HOURS
Status: DISCONTINUED | OUTPATIENT
Start: 2024-10-30 | End: 2024-10-31

## 2024-10-30 RX ORDER — IPRATROPIUM BROMIDE AND ALBUTEROL SULFATE 2.5; .5 MG/3ML; MG/3ML
3 SOLUTION RESPIRATORY (INHALATION)
Status: DISCONTINUED | OUTPATIENT
Start: 2024-10-30 | End: 2024-11-07 | Stop reason: HOSPADM

## 2024-10-30 RX ORDER — PROMETHAZINE HYDROCHLORIDE 25 MG/1
12.5-25 SUPPOSITORY RECTAL EVERY 4 HOURS PRN
Status: DISCONTINUED | OUTPATIENT
Start: 2024-10-30 | End: 2024-11-07 | Stop reason: HOSPADM

## 2024-10-30 RX ORDER — HYDROMORPHONE HYDROCHLORIDE 2 MG/ML
1.5 INJECTION, SOLUTION INTRAMUSCULAR; INTRAVENOUS; SUBCUTANEOUS
Status: DISCONTINUED | OUTPATIENT
Start: 2024-10-30 | End: 2024-10-31

## 2024-10-30 RX ORDER — SODIUM CHLORIDE 9 MG/ML
30 INJECTION, SOLUTION INTRAVENOUS ONCE
Status: COMPLETED | OUTPATIENT
Start: 2024-10-30 | End: 2024-10-30

## 2024-10-30 RX ORDER — FOLIC ACID 1 MG/1
1 TABLET ORAL DAILY
Status: DISCONTINUED | OUTPATIENT
Start: 2024-10-31 | End: 2024-10-31

## 2024-10-30 RX ORDER — NOREPINEPHRINE BITARTRATE 0.03 MG/ML
0-1 INJECTION, SOLUTION INTRAVENOUS CONTINUOUS
Status: DISCONTINUED | OUTPATIENT
Start: 2024-10-30 | End: 2024-10-31

## 2024-10-30 RX ORDER — POTASSIUM CHLORIDE 29.8 MG/ML
40 INJECTION INTRAVENOUS ONCE
Status: DISCONTINUED | OUTPATIENT
Start: 2024-10-30 | End: 2024-10-30

## 2024-10-30 RX ORDER — GAUZE BANDAGE 2" X 2"
100 BANDAGE TOPICAL DAILY
Status: DISCONTINUED | OUTPATIENT
Start: 2024-10-31 | End: 2024-10-30

## 2024-10-30 RX ORDER — AZITHROMYCIN 250 MG/1
500 TABLET, FILM COATED ORAL DAILY
Status: DISCONTINUED | OUTPATIENT
Start: 2024-10-31 | End: 2024-10-31

## 2024-10-30 RX ORDER — CALCIUM GLUCONATE 20 MG/ML
2 INJECTION, SOLUTION INTRAVENOUS ONCE
Status: COMPLETED | OUTPATIENT
Start: 2024-10-30 | End: 2024-10-30

## 2024-10-30 RX ORDER — NALTREXONE HYDROCHLORIDE 50 MG/1
50 TABLET, FILM COATED ORAL DAILY
COMMUNITY

## 2024-10-30 RX ORDER — POTASSIUM CHLORIDE 7.45 MG/ML
10 INJECTION INTRAVENOUS
Status: COMPLETED | OUTPATIENT
Start: 2024-10-30 | End: 2024-10-30

## 2024-10-30 RX ORDER — POLYETHYLENE GLYCOL 3350 17 G
2 POWDER IN PACKET (EA) ORAL
COMMUNITY

## 2024-10-30 RX ORDER — NOREPINEPHRINE BITARTRATE 0.03 MG/ML
0-1 INJECTION, SOLUTION INTRAVENOUS CONTINUOUS
Status: DISCONTINUED | OUTPATIENT
Start: 2024-10-30 | End: 2024-10-30

## 2024-10-30 RX ORDER — PHENOL 1.4 %
10 AEROSOL, SPRAY (ML) MUCOUS MEMBRANE
COMMUNITY

## 2024-10-30 RX ORDER — SODIUM CHLORIDE, SODIUM LACTATE, POTASSIUM CHLORIDE, AND CALCIUM CHLORIDE .6; .31; .03; .02 G/100ML; G/100ML; G/100ML; G/100ML
INJECTION, SOLUTION INTRAVENOUS
Status: COMPLETED | OUTPATIENT
Start: 2024-10-30 | End: 2024-10-30

## 2024-10-30 RX ORDER — FLUOXETINE 40 MG/1
40 CAPSULE ORAL DAILY
COMMUNITY

## 2024-10-30 RX ORDER — PROMETHAZINE HYDROCHLORIDE 25 MG/1
12.5-25 TABLET ORAL EVERY 4 HOURS PRN
Status: DISCONTINUED | OUTPATIENT
Start: 2024-10-30 | End: 2024-10-31

## 2024-10-30 RX ORDER — POTASSIUM CHLORIDE 7.45 MG/ML
10 INJECTION INTRAVENOUS ONCE
Status: DISCONTINUED | OUTPATIENT
Start: 2024-10-30 | End: 2024-10-30

## 2024-10-30 RX ORDER — TRAZODONE HYDROCHLORIDE 100 MG/1
100 TABLET ORAL
COMMUNITY

## 2024-10-30 RX ORDER — ACETAMINOPHEN 325 MG/1
650 TABLET ORAL EVERY 6 HOURS PRN
Status: DISCONTINUED | OUTPATIENT
Start: 2024-10-30 | End: 2024-10-30

## 2024-10-30 RX ORDER — AMOXICILLIN 250 MG
2 CAPSULE ORAL 2 TIMES DAILY
Status: DISCONTINUED | OUTPATIENT
Start: 2024-10-30 | End: 2024-10-31

## 2024-10-30 RX ORDER — BISACODYL 10 MG
10 SUPPOSITORY, RECTAL RECTAL
Status: DISCONTINUED | OUTPATIENT
Start: 2024-10-30 | End: 2024-10-31

## 2024-10-30 RX ORDER — POLYETHYLENE GLYCOL 3350 17 G/17G
1 POWDER, FOR SOLUTION ORAL
Status: DISCONTINUED | OUTPATIENT
Start: 2024-10-30 | End: 2024-10-31

## 2024-10-30 RX ORDER — INSULIN LISPRO 100 [IU]/ML
3-14 INJECTION, SOLUTION INTRAVENOUS; SUBCUTANEOUS EVERY 6 HOURS
Status: DISCONTINUED | OUTPATIENT
Start: 2024-10-30 | End: 2024-10-31

## 2024-10-30 RX ORDER — POTASSIUM CHLORIDE 14.9 MG/ML
20 INJECTION INTRAVENOUS ONCE
Status: DISCONTINUED | OUTPATIENT
Start: 2024-10-30 | End: 2024-10-30

## 2024-10-30 RX ORDER — HYDROMORPHONE HYDROCHLORIDE 1 MG/ML
0.75 INJECTION, SOLUTION INTRAMUSCULAR; INTRAVENOUS; SUBCUTANEOUS
Status: DISCONTINUED | OUTPATIENT
Start: 2024-10-30 | End: 2024-10-31

## 2024-10-30 RX ORDER — ENOXAPARIN SODIUM 100 MG/ML
40 INJECTION SUBCUTANEOUS DAILY
Status: DISCONTINUED | OUTPATIENT
Start: 2024-10-30 | End: 2024-11-02

## 2024-10-30 RX ORDER — HYDROXYZINE HYDROCHLORIDE 25 MG/1
25 TABLET, FILM COATED ORAL
COMMUNITY

## 2024-10-30 RX ORDER — ONDANSETRON 2 MG/ML
4 INJECTION INTRAMUSCULAR; INTRAVENOUS EVERY 4 HOURS PRN
Status: DISCONTINUED | OUTPATIENT
Start: 2024-10-30 | End: 2024-11-07 | Stop reason: HOSPADM

## 2024-10-30 RX ORDER — AZITHROMYCIN 250 MG/1
500 TABLET, FILM COATED ORAL DAILY
Status: DISCONTINUED | OUTPATIENT
Start: 2024-10-30 | End: 2024-10-30

## 2024-10-30 RX ADMIN — HYDROMORPHONE HYDROCHLORIDE 1.5 MG: 2 INJECTION INTRAMUSCULAR; INTRAVENOUS; SUBCUTANEOUS at 22:28

## 2024-10-30 RX ADMIN — NOREPINEPHRINE BITARTRATE 0.3 MCG/KG/MIN: 0.03 INJECTION, SOLUTION INTRAVENOUS at 10:58

## 2024-10-30 RX ADMIN — POTASSIUM CHLORIDE 10 MEQ: 7.46 INJECTION, SOLUTION INTRAVENOUS at 13:25

## 2024-10-30 RX ADMIN — MAGNESIUM SULFATE HEPTAHYDRATE 2 G: 2 INJECTION, SOLUTION INTRAVENOUS at 15:08

## 2024-10-30 RX ADMIN — HUMAN ALBUMIN MICROSPHERES AND PERFLUTREN 3 ML: 10; .22 INJECTION, SOLUTION INTRAVENOUS at 16:28

## 2024-10-30 RX ADMIN — PIPERACILLIN AND TAZOBACTAM 4.5 G: 4; .5 INJECTION, POWDER, FOR SOLUTION INTRAVENOUS at 12:27

## 2024-10-30 RX ADMIN — POTASSIUM CHLORIDE 10 MEQ: 7.46 INJECTION, SOLUTION INTRAVENOUS at 15:08

## 2024-10-30 RX ADMIN — IOHEXOL 100 ML: 350 INJECTION, SOLUTION INTRAVENOUS at 11:11

## 2024-10-30 RX ADMIN — HYDROMORPHONE HYDROCHLORIDE 1.5 MG: 2 INJECTION INTRAMUSCULAR; INTRAVENOUS; SUBCUTANEOUS at 23:39

## 2024-10-30 RX ADMIN — FOLIC ACID: 5 INJECTION, SOLUTION INTRAMUSCULAR; INTRAVENOUS; SUBCUTANEOUS at 17:41

## 2024-10-30 RX ADMIN — POTASSIUM CHLORIDE 10 MEQ: 7.46 INJECTION, SOLUTION INTRAVENOUS at 12:22

## 2024-10-30 RX ADMIN — ENOXAPARIN SODIUM 40 MG: 100 INJECTION SUBCUTANEOUS at 17:49

## 2024-10-30 RX ADMIN — CALCIUM GLUCONATE 2 G: 20 INJECTION, SOLUTION INTRAVENOUS at 12:10

## 2024-10-30 RX ADMIN — SODIUM CHLORIDE 2121 ML: 9 INJECTION, SOLUTION INTRAVENOUS at 11:53

## 2024-10-30 RX ADMIN — FAMOTIDINE 20 MG: 10 INJECTION, SOLUTION INTRAVENOUS at 17:49

## 2024-10-30 RX ADMIN — HYDROMORPHONE HYDROCHLORIDE 0.75 MG: 1 INJECTION, SOLUTION INTRAMUSCULAR; INTRAVENOUS; SUBCUTANEOUS at 22:08

## 2024-10-30 RX ADMIN — AZITHROMYCIN DIHYDRATE 500 MG: 250 TABLET ORAL at 15:09

## 2024-10-30 RX ADMIN — NOREPINEPHRINE BITARTRATE 0.1 MCG/KG/MIN: 32 SOLUTION INTRAVENOUS at 16:28

## 2024-10-30 RX ADMIN — POTASSIUM CHLORIDE 10 MEQ: 7.46 INJECTION, SOLUTION INTRAVENOUS at 16:07

## 2024-10-30 RX ADMIN — SODIUM CHLORIDE, POTASSIUM CHLORIDE, SODIUM LACTATE AND CALCIUM CHLORIDE 1 L: 600; 310; 30; 20 INJECTION, SOLUTION INTRAVENOUS at 10:51

## 2024-10-30 RX ADMIN — CEFTRIAXONE SODIUM 2000 MG: 10 INJECTION, POWDER, FOR SOLUTION INTRAVENOUS at 17:46

## 2024-10-30 ASSESSMENT — FIBROSIS 4 INDEX
FIB4 SCORE: 4.77
FIB4 SCORE: 2.73
FIB4 SCORE: 4.77

## 2024-10-30 NOTE — ASSESSMENT & PLAN NOTE
?due to hypothermia vs hypoxia vs shock vs alcohol intoxication  CT head  unremarkable  UDS: (+)benzos and barbituates  BAL of 400  Check ammonia  Cont to correct all underlying metabolic abnormalities   Waking up well post extubation this morning

## 2024-10-30 NOTE — H&P
"Critical Care History & Physical Note    Date of Service  10/30/2024    Primary Care Physician  Pcp Pt States None    Consultants  trauma surgery    Specialist Names: Dr Whalen    Code Status  Prior    Chief Complaint  No chief complaint on file.      History of Presenting Illness  All history was obtained from ER staff as well as an extensive chart review as the patient was intubated at the time of my evaluation.    Jethro Cook is a 49 y.o. male who who receives his care at the [facility] with the past medical history significant for alcohol abuse, hyperlipidemia, and CAD s/p MI who was found down by his family with altered mentation on 10/30/2024.  His last known well was 24 hours prior.  EMS was activated and the patient was found to have abrasions to his left head and a low GCS with a \"blow left pupil\".  EMS intubated the patient with Rocuronium 200mg IV, Ketamine 200mgIV, Fentanyl 50mcg, and also was given a 300cc bolus of 3% for transport.  Upon arrival to [facility], [place], the patient was deemed and trauma patient.  The patient underwent trauma evaluation and was found to have left eye injury, but otherwise no trauma injuries and thus the critical care team was consulted.      The patient was hypotensive upon arrival requiring a sepsis bolus of 2.2 liters of IVF as well as broad spectrum antibiotics with vasopressor therapies.  His blood alcohol level was 400 with a blood glucose level of >600.    I discussed the plan of care with bedside RN, charge RN, pharmacy, and RT.    Review of Systems  Review of Systems   Unable to perform ROS: Intubated       Past Medical History  Depression, alcohol abuse, CAD, hyperlipidemia, hypertension    Surgical History  Unable to obtain as the patient is intubated    Family History  Unable to obtain due to the patient being on the ventilator    Social History   reports that he has been smoking cigarettes. He has never used smokeless tobacco. He reports current alcohol use of about 1.8 oz " of alcohol per week. He reports that he does not currently use drugs.    Allergies  Allergies   Allergen Reactions    Lisinopril Unspecified     Mood swings        Medications  Prior to Admission Medications   Prescriptions Last Dose Informant Patient Reported? Taking?   Melatonin 10 MG Tab UNK at [facility] Patient's Home Pharmacy Yes Yes   Sig: Take 10 mg by mouth at bedtime.   amLODIPine (NORVASC) 10 MG Tab UNK at [facility] Patient's Home Pharmacy No No   Sig: Take 1 Tablet by mouth every day.   atorvastatin (LIPITOR) 40 MG Tab UNK at [facility] Patient's Home Pharmacy No No   Sig: Take 1 Tablet by mouth every evening.   carvedilol (COREG) 6.25 MG Tab UNK at [facility] Patient's Home Pharmacy No No   Sig: Take 1 Tablet by mouth 2 times a day with meals.   clopidogrel (PLAVIX) 75 MG Tab UNK at [facility] Patient's Home Pharmacy No No   Sig: Take 1 Tablet by mouth every day.   cyanocobalamin (VITAMIN B-12) 500 MCG Tab UNK at [facility] Patient's Home Pharmacy Yes No   Sig: Take 500 mcg by mouth every day.   fluoxetine (PROZAC) 40 MG capsule UNK at [facility] Patient's Home Pharmacy Yes Yes   Sig: Take 40 mg by mouth every day.   gabapentin (NEURONTIN) 300 MG Cap UNK at [facility] Patient's Home Pharmacy Yes No   Sig: Take 300 mg by mouth 3 times a day as needed (MOOD/PAIN).   hydrOXYzine HCl (ATARAX) 25 MG Tab UNK at [facility] Patient's Home Pharmacy Yes Yes   Sig: Take 25 mg by mouth at bedtime.   losartan (COZAAR) 100 MG Tab UNK at [facility] Patient's Home Pharmacy Yes Yes   Sig: Take 100 mg by mouth every day.   naltrexone (DEPADE) 50 MG Tab UNK at [facility] Patient's Home Pharmacy Yes Yes   Sig: Take 50 mg by mouth every day.   nicotine polacrilex (COMMIT) 2 MG lozenge UNK at [facility] Patient's Home Pharmacy Yes Yes   Sig: Place 2 mg into mouth between cheek and gum every 2 hours as needed (nicotine dependence).   traZODone (DESYREL) 100 MG Tab UNK at [facility] Patient's Home Pharmacy Yes Yes   Sig: Take 100 mg by mouth at bedtime.   vitamin D (CHOLECALCIFEROL) 1000 Unit Tab UNK at [facility]  Patient's Home Pharmacy Yes No   Sig: Take 1,000 Units by mouth every day.      Facility-Administered Medications: None       Physical Exam  Temp:  [26.2 °C (79.2 °F)-27.5 °C (81.5 °F)] 27.5 °C (81.5 °F)  Pulse:  [43-52] 45  Resp:  [14-27] 26  BP: ()/() 121/79  SpO2:  [95 %-98 %] 98 %  Blood Pressure: 121/79   Temperature: (!) 27.5 °C (81.5 °F)   Pulse: (!) 45   Respiration: (!) 26   Pulse Oximetry: 98 %       Physical Exam  Vitals and nursing note reviewed.   Constitutional:       Appearance: He is obese. He is ill-appearing. He is not toxic-appearing.   HENT:      Head: Normocephalic.      Comments: Superficial abrasion noted to right forehead      Right Ear: External ear normal.      Left Ear: External ear normal.      Nose: Nose normal. No rhinorrhea.      Mouth/Throat:      Mouth: Mucous membranes are dry.      Comments: ETT in place  Eyes:      Comments: Left eye with abnormal pupil and non reactive, right pupil reactive to light, minor right periorbital bruising noted   Neck:      Comments: Right IJ TLC  Cardiovascular:      Rate and Rhythm: Regular rhythm. Bradycardia present.      Pulses:           Dorsalis pedis pulses are 1+ on the right side and 1+ on the left side.      Heart sounds: No murmur heard.  Pulmonary:      Breath sounds: No wheezing.   Chest:      Chest wall: No tenderness.   Abdominal:      Palpations: Abdomen is soft.      Tenderness: There is no abdominal tenderness. There is no guarding or rebound.   Genitourinary:     Comments: Meredith in place  Musculoskeletal:         General: Normal range of motion.      Cervical back: Normal range of motion and neck supple.      Right lower le+ Edema present.      Left lower le+ Edema present.   Lymphadenopathy:      Cervical: No cervical adenopathy.   Skin:     General: Skin is warm and dry.      Capillary Refill: Capillary refill takes 2 to 3 seconds.      Findings: No rash.   Neurological:      Comments: (+)cough, no w/d to  painful stimuli   Psychiatric:      Comments: Unable to assess         Laboratory:  Recent Labs     10/30/24  1057   WBC 2.3*   RBC 3.26*   HEMOGLOBIN 11.1*   HEMATOCRIT 35.0*   .4*   MCH 34.0*   MCHC 31.7*   RDW 60.3*   PLATELETCT 173   MPV 10.4     Recent Labs     10/30/24  1057   SODIUM 127*   POTASSIUM 2.5*   CHLORIDE 89*   CO2 17*   GLUCOSE 611*   BUN 8   CREATININE 0.68   CALCIUM 6.6*     Recent Labs     10/30/24  1057   ALTSGPT 71*   ASTSGOT 139*   ALKPHOSPHAT 177*   TBILIRUBIN 0.8   GLUCOSE 611*     Recent Labs     10/30/24  1057   APTT 33.8   INR 1.02     Recent Labs     10/30/24  1057   NTPROBNP <36         Recent Labs     10/30/24  1057   TROPONINT 25*       Imaging:  CT-LSPINE W/O PLUS RECONS   Final Result      1. No acute osseous abnormality involving the lumbar spine.   2. L5-S1 degenerative disc disease and facet arthrosis.   3. For imaging findings regarding the remainder of the abdomen and pelvis, please see CT of the chest, abdomen and pelvis performed the same day.      CT-TSPINE W/O PLUS RECONS   Final Result      1. No acute posttraumatic imaging findings in the thoracic spine.   2. Appropriate positions of the endotracheal and nasogastric tubes.   3. For the imaging findings in the remainder of the chest and abdomen, please see CT chest, abdomen and pelvis performed the same day.      CT-CHEST,ABDOMEN,PELVIS WITH   Final Result      1.  BILATERAL lower lobe atelectasis. Superimposed pneumonia not excluded   2.  Edema in the retroperitoneum could indicate pancreatitis, gastritis or duodenitis   3.  Hepatic steatosis   4.  Trace ascites   5.  LEFT inguinal hernia contains fat   6.  Thyroid gland is enlarged and heterogenous, recommend correlation with laboratory studies and ultrasound when clinically appropriate      CT-CSPINE WITHOUT PLUS RECONS   Final Result      1.  No acute abnormality identified.   2.  Multilevel multifactorial degenerative changes   3.  Heterogeneous enlarged  thyroid, recommend correlation with laboratory studies and ultrasound when clinically appropriate      CT-MAXILLOFACIAL W/O PLUS RECONS   Final Result      1. No acute facial bone fracture.   2. The lens of the left globe is displaced posteriorly into the vitreous humor, best seen on series 9 image 108. It was appropriately positioned on the prior exam performed on 1/28/2024.   3. Intracranial atherosclerotic calcifications.      CT-HEAD W/O   Final Result   Addendum (preliminary) 1 of 1   LEFT globe injury with displaced LEFT lung is better seen on the    maxillofacial CT performed at the same time      Final      No acute intracranial abnormality.            US-ABDOMEN F.A.S.T. LTD (FOR ED USE ONLY)   Final Result      No free fluid seen in all 4 quadrants.      Negative FAST scan.            DX-CHEST-LIMITED (1 VIEW)   Final Result         1. The endotracheal tube terminates at the lorenza; withdrawal 4 cm is recommended.   2. Advancement of the nasogastric tube 20 cm is recommended. There is gastric distention with air.   3. Low lung volumes.      DX-PELVIS-1 OR 2 VIEWS   Final Result      No            Assessment/Plan:  Justification for Admission Status  I anticipate this patient will require at least two midnights for appropriate medical management, necessitating inpatient admission because critical illness requiring intubation/mechanical ventilation, acute intoxication, and significant metabolic abnormalities    Patient will need a ICU (Adult and Pediatrics) bed on EMERGENCY service .  The need is secondary to acute encephalopathy with acute hypoxic respiratory failure on the ventilator and shock.    * Acute encephalopathy- (present on admission)  Assessment & Plan  ?due to hypothermia vs hypoxia vs shock vs alcohol intoxication  CT head  unremarkable  UDS: (+)benzos and barbituates  BAL of 400  Check ammonia  Cont to correct all underlying metabolic abnormalities   May need EEG and MRI brain if patient  doesn't improve over next 24 hours     Macrocytic anemia- (present on admission)  Assessment & Plan  No obvious acute blood loss  Conservative transfusion protocols for Hb<7    Hyponatremia- (present on admission)  Assessment & Plan  Pseudo-->corrected to 139 for elevated glucose     Electrolyte abnormality- (present on admission)  Assessment & Plan  Replete K to goal > 4, KCl 40meq IV now  Replete Mg to goal > 2, MgSO4 2g IV now  Replete Ca to goal > 8, Ca gluconate 1g IV in ER  Monitor phosphorus after starting enteral feeds    Lactic acidosis- (present on admission)  Assessment & Plan  Due to shock  S/p IVF resuscitation  Trending     Hypothermia- (present on admission)  Assessment & Plan  ?due to exposure vs sepsis  Active warming measures ongoing     Shock (HCC)- (present on admission)  Assessment & Plan  Suspect due to sepsis/aspiration pna vs CAP  S/p sepsis bolus of 2.2 liters with elevated lactic acid and respiratory failure  Trend lactic acid  MAP goals > 65  I am actively titrating levophed for MAP goals  Actively warming patient for hypothermia  Follow up on cultures   Check cortisol and procalcitonin  ECHO pending     Hyperglycemia- (present on admission)  Assessment & Plan  ?due to acute alcoholism  Check HbA1c  BG goals 120-180s  High ISS  May need insulin infusion if not controlled     Acute alcoholic intoxication with complication (HCC)- (present on admission)  Assessment & Plan  Hx of binge drinking and abuse  BAL of 409  Monitor for withdrawal in the next 72 hours  Rally bag in ER  Cont to replete all electrolytes  Propofol for sedation       Acute respiratory failure with hypoxia (HCC)- (present on admission)  Assessment & Plan  Intubated in field on 10/30 for airway protection/hypoxia  Cont full vent support  RT/O2 protocols  Vent bundle protocols  I am actively adjusting vent settings based on ABGs/vent mechanics  ECHO pending  Broad spectrum abx for possible aspiration with  ceftriaxone/azithromycin  SAT/SBT as clinically appropriate     Pure hypercholesterolemia- (present on admission)  Assessment & Plan  Hx of     Primary hypertension- (present on admission)  Assessment & Plan  Holding home BP medications while on vasopressors     Transaminitis- (present on admission)  Assessment & Plan  Suspect due to alcoholic hepatitis  Trending  Check ammonia     NSTEMI (non-ST elevated myocardial infarction) (HCC)- (present on admission)  Assessment & Plan  Hx of         VTE prophylaxis: SCDs/TEDs and enoxaparin ppx    The patient remains critically ill.  I have assessed and reassessed the respiratory status and made ventilator adjustments based upon arterial blood gas analysis, ventilator waveforms and airway mechanics.  I have assessed and reassessed the blood pressure, hemodynamics, cardiovascular status. This patient remains at high risk for worsening cardiopulmonary dysfunction and death without the above critical care interventions.    Critical care time = 113 minutes

## 2024-10-30 NOTE — H&P
CHIEF COMPLAINT: Found down.     HISTORY OF PRESENT ILLNESS: The patient is a 49 year-old White man who was  found down at his home during a welfare check. The patient is unable to articulate any subjective complaints.    TRIAGE CATEGORY: The patient was triaged as a Non Trauma Activation. The patient was upgraded to a Trauma Red activation for possible signs of trauma. An expeditious primary and secondary survey with required adjuncts was conducted. See Trauma Narrator for full details.    PAST MEDICAL HISTORY:  has no past medical history on file.Left orbital fracture and left eye injury. NSTEMI. Hypertension. History of SVT.    PAST SURGICAL HISTORY:  has no past surgical history on file.    ALLERGIES:   Allergies   Allergen Reactions    Lisinopril Unspecified     Mood swings      CURRENT MEDICATIONS:   Home Medications       Reviewed by Gadiel Danielle (Pharmacy Tech) on 10/30/24 at 1135  Med List Status: Complete     Medication Last Dose Status   amLODIPine (NORVASC) 10 MG Tab UNK Active   atorvastatin (LIPITOR) 40 MG Tab UNK Active   carvedilol (COREG) 6.25 MG Tab UNK Active   clopidogrel (PLAVIX) 75 MG Tab UNK Active   cyanocobalamin (VITAMIN B-12) 500 MCG Tab UNK Active   fluoxetine (PROZAC) 40 MG capsule UNK Active   gabapentin (NEURONTIN) 300 MG Cap UNK Active   hydrOXYzine HCl (ATARAX) 25 MG Tab UNK Active   losartan (COZAAR) 100 MG Tab UNK Active   Melatonin 10 MG Tab UNK Active   naltrexone (DEPADE) 50 MG Tab UNK Active   nicotine polacrilex (COMMIT) 2 MG lozenge UNK Active   traZODone (DESYREL) 100 MG Tab UNK Active   vitamin D (CHOLECALCIFEROL) 1000 Unit Tab UNK Active                  Audit from Redirected Encounters    **Home medications have not yet been reviewed for this encounter**     FAMILY HISTORY: family history is not on file.    SOCIAL HISTORY:  reports that he has been smoking cigarettes. He has never used smokeless tobacco. He reports current alcohol use of about 1.8 oz of alcohol  "per week. He reports that he does not currently use drugs.    REVIEW OF SYSTEMS: Comprehensive review of systems is not able to be elicited from the patient secondary to the acuity of the clinical situation.    PHYSICAL EXAMINATION:      Vital Signs: BP (!) 162/112   Pulse (!) 43   Temp (!) 27.5 °C (81.5 °F) (Bladder)   Resp 14   Ht 1.753 m (5' 9\")   Wt 113 kg (250 lb)   SpO2 97%   Physical Exam  Vitals and nursing note reviewed.   Constitutional:       Interventions: He is intubated.   HENT:      Right Ear: Tympanic membrane normal.      Left Ear: Tympanic membrane normal.      Mouth/Throat:      Mouth: Mucous membranes are moist.      Pharynx: Oropharynx is clear.   Eyes:      General: Lids are normal.      Pupils: Pupils are unequal.      Right eye: Pupil is sluggish.      Left eye: Pupil is sluggish (dilated).   Neck:      Trachea: No tracheal deviation.   Cardiovascular:      Rate and Rhythm: Regular rhythm. Bradycardia present.      Pulses: Normal pulses.   Pulmonary:      Effort: He is intubated.      Breath sounds: No decreased air movement.   Abdominal:      General: There is no distension.      Palpations: Abdomen is soft.      Tenderness: There is no abdominal tenderness. There is no guarding.   Musculoskeletal:      Cervical back: No crepitus.   Skin:     Capillary Refill: Capillary refill takes 2 to 3 seconds.   Neurological:      Mental Status: He is lethargic.      GCS: GCS eye subscore is 1. GCS verbal subscore is 1. GCS motor subscore is 1.      Deep Tendon Reflexes: Reflexes normal.   Psychiatric:      Comments: Unable to assess     LABORATORY VALUES:   Recent Labs     10/30/24  1057   WBC 2.3*   RBC 3.26*   HEMOGLOBIN 11.1*   HEMATOCRIT 35.0*   .4*   MCH 34.0*   MCHC 31.7*   RDW 60.3*   PLATELETCT 173   MPV 10.4       Recent Labs     10/30/24  1057   APTT 33.8   INR 1.02      IMAGING:   CT-LSPINE W/O PLUS RECONS   Final Result      1. No acute osseous abnormality involving the lumbar " spine.   2. L5-S1 degenerative disc disease and facet arthrosis.   3. For imaging findings regarding the remainder of the abdomen and pelvis, please see CT of the chest, abdomen and pelvis performed the same day.      CT-TSPINE W/O PLUS RECONS   Final Result      1. No acute posttraumatic imaging findings in the thoracic spine.   2. Appropriate positions of the endotracheal and nasogastric tubes.   3. For the imaging findings in the remainder of the chest and abdomen, please see CT chest, abdomen and pelvis performed the same day.      CT-CHEST,ABDOMEN,PELVIS WITH   Final Result      1.  BILATERAL lower lobe atelectasis. Superimposed pneumonia not excluded   2.  Edema in the retroperitoneum could indicate pancreatitis, gastritis or duodenitis   3.  Hepatic steatosis   4.  Trace ascites   5.  LEFT inguinal hernia contains fat   6.  Thyroid gland is enlarged and heterogenous, recommend correlation with laboratory studies and ultrasound when clinically appropriate      CT-CSPINE WITHOUT PLUS RECONS   Final Result      1.  No acute abnormality identified.   2.  Multilevel multifactorial degenerative changes   3.  Heterogeneous enlarged thyroid, recommend correlation with laboratory studies and ultrasound when clinically appropriate      CT-MAXILLOFACIAL W/O PLUS RECONS   Final Result      1. No acute facial bone fracture.   2. The lens of the left globe is displaced posteriorly into the vitreous humor, best seen on series 9 image 108. It was appropriately positioned on the prior exam performed on 1/28/2024.   3. Intracranial atherosclerotic calcifications.      CT-HEAD W/O   Final Result   Addendum (preliminary) 1 of 1   LEFT globe injury with displaced LEFT lung is better seen on the    maxillofacial CT performed at the same time      Final      No acute intracranial abnormality.            US-ABDOMEN F.A.S.T. LTD (FOR ED USE ONLY)   Final Result      No free fluid seen in all 4 quadrants.      Negative FAST scan.             DX-CHEST-LIMITED (1 VIEW)   Final Result         1. The endotracheal tube terminates at the lorenza; withdrawal 4 cm is recommended.   2. Advancement of the nasogastric tube 20 cm is recommended. There is gastric distention with air.   3. Low lung volumes.      DX-PELVIS-1 OR 2 VIEWS   Final Result      No        ASSESSMENT AND PLAN:   No evidence for acute traumatic injury.    DISPOSITION: Medical evaluation and admission. [facility] Trauma Surgery Service will follow. Interval Trauma tertiary survey.           ____________________________________     Leonel Whalen M.D.    DD: 10/30/2024  11:13 AM

## 2024-10-30 NOTE — ED NOTES
Pt BIB [facility] from home, pt was last seen well by family on 11/29. Pt was found down unconscious with abrasions to left head, and blown left pupil. Pt was intubated en route, pt received a total of 300 mL of 3% NS, 200mg Rocuronium, 200 mg Ketamine, 200 mcg Fentanyl, and 50 mcg of Epinephrine (during several push doses). Pt arrives with 8.0 ET tube in place, on 15 mcg/min of Levophed, GCS 3, c-collar in place.

## 2024-10-30 NOTE — DISCHARGE PLANNING
Trauma Red    Pt brought to the ED via [facility] from the [facility]. Pt arrived intubated, he was found down at home by family. Pt is Jethro Cook 1975. [facility] tried to call Emergency Contact Paramjit Cook [phone] phone has a busy signal. [facility] contacted Pt sister Briseyda who was aware Pt being brought to hospital, she can not get a hold of Pt son either. [facility] notified Briseyda that somebody would call her with a medical update once his scans and lab work are completed.

## 2024-10-30 NOTE — ASSESSMENT & PLAN NOTE
Hx of binge drinking and abuse  BAL of 409  Monitor for withdrawal in the next 72 hours  Rally bag in ER - cont IV thiamine 200 mg TID  Cont to replete all electrolytes  Ativan for etOH w/d

## 2024-10-30 NOTE — PROGRESS NOTES
Informed by lab in trauma room that a TEG and ABG is still needed. Updated bedside RN Zaria at this time.

## 2024-10-30 NOTE — ASSESSMENT & PLAN NOTE
Intubated in field on 10/30 for airway protection/hypoxia  Extubated successfully on morning of 10/31  ECHO pending  Broad spectrum abx for possible aspiration with ceftriaxone/azithromycin  Still requiring 8L face mask
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
## 2024-10-30 NOTE — PROGRESS NOTES
4 Eyes Skin Assessment Completed by EVA Hyman and EVA Ortiz.    Head Redness burising and discoloration to Right Orbit. Discoloration/abrasion to Right Forehead   Ears Redness and Blanching  Nose WDL  Mouth WDL  Neck WDL  Breast/Chest Bruising to left chest  Shoulder Blades Redness and Blanching  Spine Redness and Blanching  (R) Arm/Elbow/Hand Redness, Blanching, Abrasion, and Discoloration  (L) Arm/Elbow/Hand Redness, Blanching, Abrasion, and Discoloration  Abdomen WDL  Groin WDL  Scrotum/Coccyx/Buttocks Redness and Blanching  (R) Leg Redness, Blanching, and Abrasion  (L) Leg Redness, Blanching, and Abrasion  (R) Heel/Foot/Toe Redness and Blanching  (L) Heel/Foot/Toe Redness and Blanching          Devices In Places ECG, Blood Pressure Cuff, Pulse Ox, Meredith, SCD's, ET Tube, and Central Line      Interventions In Place Sacral Mepilex, Pillows, Q2 Turns, and Low Air Loss Mattress    Possible Skin Injury Yes    Pictures Uploaded Into Epic Yes  Wound Consult Placed Yes  RN Wound Prevention Protocol Ordered Yes

## 2024-10-30 NOTE — ASSESSMENT & PLAN NOTE
Suspect due to sepsis/aspiration pna vs CAP  S/p sepsis bolus of 2.2 liters with elevated lactic acid and respiratory failure  Trend lactic acid  MAP goals > 65  I am actively titrating levophed for MAP goals  Actively warming patient for hypothermia  Follow up on cultures   Check cortisol and procalcitonin  ECHO pending

## 2024-10-30 NOTE — ED NOTES
Pharmacy Medication Reconciliation      ~Medication reconciliation updated and complete per patient home pharmacy   ~Allergies have been verified and updated per patient home pharmacy   ~No oral ABX within the last 30 days  ~Patient home pharmacy :  [facility]      ~Anticoagulants (rivaroxaban, apixaban, edoxaban, dabigatran, warfarin, enoxaparin) taken in the last 14 days? No

## 2024-10-30 NOTE — ED NOTES
Lab called with critical result of Lactic acid=9 at 1414. Critical lab result read back to Lab.   Norma Nix notified of critical lab result at 1414.  Critical lab result read back by Dr. Lao.

## 2024-10-30 NOTE — ASSESSMENT & PLAN NOTE
Replete K to goal > 4, KCl 40meq IV now  Replete Mg to goal > 2, MgSO4 2g IV now  Replete Ca to goal > 8, Ca gluconate 1g IV in ER  Monitor phosphorus after starting enteral feeds
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
## 2024-10-30 NOTE — ED NOTES
1052: Norepinehrine gtt initiated at 0.3 mcg/kg/min.  1054: Norepinephrine increased to 0.5 mg/kg/min.

## 2024-10-30 NOTE — CARE PLAN
Problem: Ventilation  Goal: Ability to achieve and maintain unassisted ventilation or tolerate decreased levels of ventilator support  Description: Target End Date:  4 days     Document on Vent flowsheet    1.  Support and monitor invasive and noninvasive mechanical ventilation  2.  Monitor ventilator weaning response  3.  Perform ventilator associated pneumonia prevention interventions  4.  Manage ventilation therapy by monitoring diagnostic test results  Outcome: Not Met     Ventilator Daily Summary    Vent Day # 1  Airway: 8@25    Ventilator settings: 26 420 10 70%  Weaning trials:   Respiratory Procedures:     Plan: Continue current ventilator settings and wean mechanical ventilation as tolerated per physician orders.

## 2024-10-31 ENCOUNTER — APPOINTMENT (OUTPATIENT)
Dept: RADIOLOGY | Facility: MEDICAL CENTER | Age: 49
DRG: 871 | End: 2024-10-31
Attending: INTERNAL MEDICINE
Payer: COMMERCIAL

## 2024-10-31 VITALS
DIASTOLIC BLOOD PRESSURE: 129 MMHG | SYSTOLIC BLOOD PRESSURE: 176 MMHG | TEMPERATURE: 97.9 F | HEART RATE: 109 BPM | WEIGHT: 252.43 LBS | OXYGEN SATURATION: 95 % | BODY MASS INDEX: 37.39 KG/M2 | RESPIRATION RATE: 24 BRPM | HEIGHT: 69 IN

## 2024-10-31 PROBLEM — F10.930 ALCOHOL WITHDRAWAL SYNDROME WITHOUT COMPLICATION (HCC): Status: ACTIVE | Noted: 2024-10-31

## 2024-10-31 LAB
ALBUMIN SERPL BCP-MCNC: 2.8 G/DL (ref 3.2–4.9)
ALBUMIN/GLOB SERPL: 1.4 G/DL
ALP SERPL-CCNC: 158 U/L (ref 30–99)
ALT SERPL-CCNC: 64 U/L (ref 2–50)
ANION GAP SERPL CALC-SCNC: 11 MMOL/L (ref 7–16)
ANION GAP SERPL CALC-SCNC: 13 MMOL/L (ref 7–16)
ANION GAP SERPL CALC-SCNC: 18 MMOL/L (ref 7–16)
ANION GAP SERPL CALC-SCNC: 20 MMOL/L (ref 7–16)
ARTERIAL PATENCY WRIST A: ABNORMAL
AST SERPL-CCNC: 135 U/L (ref 12–45)
BACTERIA UR CULT: NORMAL
BASE EXCESS BLDA CALC-SCNC: -1 MMOL/L (ref -4–3)
BASOPHILS # BLD AUTO: 0.4 % (ref 0–1.8)
BASOPHILS # BLD: 0.02 K/UL (ref 0–0.12)
BILIRUB SERPL-MCNC: 0.8 MG/DL (ref 0.1–1.5)
BODY TEMPERATURE: ABNORMAL DEGREES
BREATHS SETTING VENT: 22
BUN SERPL-MCNC: 11 MG/DL (ref 8–22)
BUN SERPL-MCNC: 12 MG/DL (ref 8–22)
BUN SERPL-MCNC: 9 MG/DL (ref 8–22)
BUN SERPL-MCNC: 9 MG/DL (ref 8–22)
CA-I SERPL-SCNC: 1 MMOL/L (ref 1.1–1.3)
CA-I SERPL-SCNC: 1 MMOL/L (ref 1.1–1.3)
CALCIUM ALBUM COR SERPL-MCNC: 8.9 MG/DL (ref 8.5–10.5)
CALCIUM SERPL-MCNC: 7.9 MG/DL (ref 8.5–10.5)
CALCIUM SERPL-MCNC: 7.9 MG/DL (ref 8.5–10.5)
CALCIUM SERPL-MCNC: 8 MG/DL (ref 8.5–10.5)
CALCIUM SERPL-MCNC: 8.1 MG/DL (ref 8.5–10.5)
CHLORIDE SERPL-SCNC: 103 MMOL/L (ref 96–112)
CHLORIDE SERPL-SCNC: 104 MMOL/L (ref 96–112)
CHLORIDE SERPL-SCNC: 105 MMOL/L (ref 96–112)
CHLORIDE SERPL-SCNC: 107 MMOL/L (ref 96–112)
CK SERPL-CCNC: 256 U/L (ref 0–154)
CO2 BLDA-SCNC: 26 MMOL/L (ref 20–33)
CO2 SERPL-SCNC: 21 MMOL/L (ref 20–33)
CO2 SERPL-SCNC: 22 MMOL/L (ref 20–33)
CO2 SERPL-SCNC: 25 MMOL/L (ref 20–33)
CO2 SERPL-SCNC: 26 MMOL/L (ref 20–33)
CREAT SERPL-MCNC: 0.67 MG/DL (ref 0.5–1.4)
CREAT SERPL-MCNC: 0.99 MG/DL (ref 0.5–1.4)
CREAT SERPL-MCNC: 1.02 MG/DL (ref 0.5–1.4)
CREAT SERPL-MCNC: 1.04 MG/DL (ref 0.5–1.4)
DELSYS IDSYS: ABNORMAL
END TIDAL CARBON DIOXIDE IECO2: 39 MMHG
EOSINOPHIL # BLD AUTO: 0.02 K/UL (ref 0–0.51)
EOSINOPHIL NFR BLD: 0.4 % (ref 0–6.9)
ERYTHROCYTE [DISTWIDTH] IN BLOOD BY AUTOMATED COUNT: 60 FL (ref 35.9–50)
GFR SERPLBLD CREATININE-BSD FMLA CKD-EPI: 114 ML/MIN/1.73 M 2
GFR SERPLBLD CREATININE-BSD FMLA CKD-EPI: 87 ML/MIN/1.73 M 2
GFR SERPLBLD CREATININE-BSD FMLA CKD-EPI: 89 ML/MIN/1.73 M 2
GFR SERPLBLD CREATININE-BSD FMLA CKD-EPI: 93 ML/MIN/1.73 M 2
GLOBULIN SER CALC-MCNC: 2 G/DL (ref 1.9–3.5)
GLUCOSE BLD STRIP.AUTO-MCNC: 114 MG/DL (ref 65–99)
GLUCOSE BLD STRIP.AUTO-MCNC: 138 MG/DL (ref 65–99)
GLUCOSE BLD STRIP.AUTO-MCNC: 60 MG/DL (ref 65–99)
GLUCOSE BLD STRIP.AUTO-MCNC: 72 MG/DL (ref 65–99)
GLUCOSE BLD STRIP.AUTO-MCNC: 76 MG/DL (ref 65–99)
GLUCOSE BLD STRIP.AUTO-MCNC: 82 MG/DL (ref 65–99)
GLUCOSE BLD STRIP.AUTO-MCNC: 89 MG/DL (ref 65–99)
GLUCOSE BLD STRIP.AUTO-MCNC: 95 MG/DL (ref 65–99)
GLUCOSE SERPL-MCNC: 62 MG/DL (ref 65–99)
GLUCOSE SERPL-MCNC: 89 MG/DL (ref 65–99)
GLUCOSE SERPL-MCNC: 93 MG/DL (ref 65–99)
GLUCOSE SERPL-MCNC: 93 MG/DL (ref 65–99)
HCO3 BLDA-SCNC: 24.3 MMOL/L (ref 17–25)
HCT VFR BLD AUTO: 33.2 % (ref 42–52)
HGB BLD-MCNC: 10.7 G/DL (ref 14–18)
HOROWITZ INDEX BLDA+IHG-RTO: 229 MM[HG]
IMM GRANULOCYTES # BLD AUTO: 0.01 K/UL (ref 0–0.11)
IMM GRANULOCYTES NFR BLD AUTO: 0.2 % (ref 0–0.9)
LACTATE SERPL-SCNC: 2.1 MMOL/L (ref 0.5–2)
LACTATE SERPL-SCNC: 3.5 MMOL/L (ref 0.5–2)
LYMPHOCYTES # BLD AUTO: 0.24 K/UL (ref 1–4.8)
LYMPHOCYTES NFR BLD: 5.1 % (ref 22–41)
MAGNESIUM SERPL-MCNC: 2 MG/DL (ref 1.5–2.5)
MAGNESIUM SERPL-MCNC: 2 MG/DL (ref 1.5–2.5)
MCH RBC QN AUTO: 34.1 PG (ref 27–33)
MCHC RBC AUTO-ENTMCNC: 32.2 G/DL (ref 32.3–36.5)
MCV RBC AUTO: 105.7 FL (ref 81.4–97.8)
MODE IMODE: ABNORMAL
MONOCYTES # BLD AUTO: 0.13 K/UL (ref 0–0.85)
MONOCYTES NFR BLD AUTO: 2.7 % (ref 0–13.4)
NEUTROPHILS # BLD AUTO: 4.32 K/UL (ref 1.82–7.42)
NEUTROPHILS NFR BLD: 91.2 % (ref 44–72)
NRBC # BLD AUTO: 0.1 K/UL
NRBC BLD-RTO: 2.1 /100 WBC (ref 0–0.2)
O2/TOTAL GAS SETTING VFR VENT: 70 %
PCO2 BLDA: 43.1 MMHG (ref 26–37)
PCO2 TEMP ADJ BLDA: 43.8 MMHG (ref 26–37)
PEEP END EXPIRATORY PRESSURE IPEEP: 10 CMH20
PH BLDA: 7.36 [PH] (ref 7.4–7.5)
PH TEMP ADJ BLDA: 7.35 [PH] (ref 7.4–7.5)
PHOSPHATE SERPL-MCNC: 3.1 MG/DL (ref 2.5–4.5)
PHOSPHATE SERPL-MCNC: 3.5 MG/DL (ref 2.5–4.5)
PLATELET # BLD AUTO: 168 K/UL (ref 164–446)
PMV BLD AUTO: 10.1 FL (ref 9–12.9)
PO2 BLDA: 160 MMHG (ref 64–87)
PO2 TEMP ADJ BLDA: 163 MMHG (ref 64–87)
POTASSIUM SERPL-SCNC: 3.9 MMOL/L (ref 3.6–5.5)
POTASSIUM SERPL-SCNC: 3.9 MMOL/L (ref 3.6–5.5)
POTASSIUM SERPL-SCNC: 4.3 MMOL/L (ref 3.6–5.5)
POTASSIUM SERPL-SCNC: 4.4 MMOL/L (ref 3.6–5.5)
PROCALCITONIN SERPL-MCNC: 0.2 NG/ML
PROT SERPL-MCNC: 4.8 G/DL (ref 6–8.2)
RBC # BLD AUTO: 3.14 M/UL (ref 4.7–6.1)
SAO2 % BLDA: 99 % (ref 93–99)
SIGNIFICANT IND 70042: NORMAL
SITE SITE: NORMAL
SODIUM SERPL-SCNC: 140 MMOL/L (ref 135–145)
SODIUM SERPL-SCNC: 142 MMOL/L (ref 135–145)
SODIUM SERPL-SCNC: 146 MMOL/L (ref 135–145)
SODIUM SERPL-SCNC: 147 MMOL/L (ref 135–145)
SOURCE SOURCE: NORMAL
SPECIMEN DRAWN FROM PATIENT: ABNORMAL
TIDAL VOLUME IVT: 420 ML
WBC # BLD AUTO: 4.7 K/UL (ref 4.8–10.8)

## 2024-10-31 PROCEDURE — 80053 COMPREHEN METABOLIC PANEL: CPT

## 2024-10-31 PROCEDURE — 83605 ASSAY OF LACTIC ACID: CPT

## 2024-10-31 PROCEDURE — 5A1945Z RESPIRATORY VENTILATION, 24-96 CONSECUTIVE HOURS: ICD-10-PCS | Performed by: HOSPITALIST

## 2024-10-31 PROCEDURE — 84145 PROCALCITONIN (PCT): CPT

## 2024-10-31 PROCEDURE — 700111 HCHG RX REV CODE 636 W/ 250 OVERRIDE (IP): Performed by: STUDENT IN AN ORGANIZED HEALTH CARE EDUCATION/TRAINING PROGRAM

## 2024-10-31 PROCEDURE — 80048 BASIC METABOLIC PNL TOTAL CA: CPT

## 2024-10-31 PROCEDURE — 99291 CRITICAL CARE FIRST HOUR: CPT | Performed by: STUDENT IN AN ORGANIZED HEALTH CARE EDUCATION/TRAINING PROGRAM

## 2024-10-31 PROCEDURE — 94799 UNLISTED PULMONARY SVC/PX: CPT

## 2024-10-31 PROCEDURE — 700101 HCHG RX REV CODE 250: Performed by: INTERNAL MEDICINE

## 2024-10-31 PROCEDURE — 83735 ASSAY OF MAGNESIUM: CPT

## 2024-10-31 PROCEDURE — 99223 1ST HOSP IP/OBS HIGH 75: CPT | Performed by: HOSPITALIST

## 2024-10-31 PROCEDURE — 82330 ASSAY OF CALCIUM: CPT

## 2024-10-31 PROCEDURE — 700102 HCHG RX REV CODE 250 W/ 637 OVERRIDE(OP): Performed by: INTERNAL MEDICINE

## 2024-10-31 PROCEDURE — A9270 NON-COVERED ITEM OR SERVICE: HCPCS | Performed by: INTERNAL MEDICINE

## 2024-10-31 PROCEDURE — A9270 NON-COVERED ITEM OR SERVICE: HCPCS | Performed by: HOSPITALIST

## 2024-10-31 PROCEDURE — 84100 ASSAY OF PHOSPHORUS: CPT

## 2024-10-31 PROCEDURE — A9270 NON-COVERED ITEM OR SERVICE: HCPCS | Performed by: STUDENT IN AN ORGANIZED HEALTH CARE EDUCATION/TRAINING PROGRAM

## 2024-10-31 PROCEDURE — 36600 WITHDRAWAL OF ARTERIAL BLOOD: CPT

## 2024-10-31 PROCEDURE — 700111 HCHG RX REV CODE 636 W/ 250 OVERRIDE (IP): Performed by: INTERNAL MEDICINE

## 2024-10-31 PROCEDURE — 700102 HCHG RX REV CODE 250 W/ 637 OVERRIDE(OP): Performed by: STUDENT IN AN ORGANIZED HEALTH CARE EDUCATION/TRAINING PROGRAM

## 2024-10-31 PROCEDURE — 94003 VENT MGMT INPAT SUBQ DAY: CPT

## 2024-10-31 PROCEDURE — 82962 GLUCOSE BLOOD TEST: CPT

## 2024-10-31 PROCEDURE — 85025 COMPLETE CBC W/AUTO DIFF WBC: CPT

## 2024-10-31 PROCEDURE — 82550 ASSAY OF CK (CPK): CPT

## 2024-10-31 PROCEDURE — 700111 HCHG RX REV CODE 636 W/ 250 OVERRIDE (IP): Mod: JG | Performed by: NURSE PRACTITIONER

## 2024-10-31 PROCEDURE — 94150 VITAL CAPACITY TEST: CPT

## 2024-10-31 PROCEDURE — 99231 SBSQ HOSP IP/OBS SF/LOW 25: CPT | Performed by: NURSE PRACTITIONER

## 2024-10-31 PROCEDURE — 700105 HCHG RX REV CODE 258: Performed by: INTERNAL MEDICINE

## 2024-10-31 PROCEDURE — 71045 X-RAY EXAM CHEST 1 VIEW: CPT

## 2024-10-31 PROCEDURE — 770020 HCHG ROOM/CARE - TELE (206)

## 2024-10-31 PROCEDURE — 700111 HCHG RX REV CODE 636 W/ 250 OVERRIDE (IP): Performed by: HOSPITALIST

## 2024-10-31 PROCEDURE — 700102 HCHG RX REV CODE 250 W/ 637 OVERRIDE(OP): Performed by: HOSPITALIST

## 2024-10-31 PROCEDURE — 82803 BLOOD GASES ANY COMBINATION: CPT

## 2024-10-31 RX ORDER — LORAZEPAM 2 MG/ML
1.5 INJECTION INTRAMUSCULAR
Status: DISCONTINUED | OUTPATIENT
Start: 2024-10-31 | End: 2024-11-03

## 2024-10-31 RX ORDER — POTASSIUM CHLORIDE 7.45 MG/ML
10 INJECTION INTRAVENOUS ONCE
Status: COMPLETED | OUTPATIENT
Start: 2024-10-31 | End: 2024-10-31

## 2024-10-31 RX ORDER — LORAZEPAM 2 MG/ML
2 INJECTION INTRAMUSCULAR
Status: DISCONTINUED | OUTPATIENT
Start: 2024-10-31 | End: 2024-10-31

## 2024-10-31 RX ORDER — LORAZEPAM 2 MG/1
2 TABLET ORAL
Status: DISCONTINUED | OUTPATIENT
Start: 2024-10-31 | End: 2024-11-03

## 2024-10-31 RX ORDER — THIAMINE HYDROCHLORIDE 100 MG/ML
200 INJECTION, SOLUTION INTRAMUSCULAR; INTRAVENOUS 3 TIMES DAILY
Status: COMPLETED | OUTPATIENT
Start: 2024-10-31 | End: 2024-10-31

## 2024-10-31 RX ORDER — CLONIDINE HYDROCHLORIDE 0.1 MG/1
0.1 TABLET ORAL
Status: DISCONTINUED | OUTPATIENT
Start: 2024-10-31 | End: 2024-11-07 | Stop reason: HOSPADM

## 2024-10-31 RX ORDER — BISACODYL 10 MG
10 SUPPOSITORY, RECTAL RECTAL
Status: DISCONTINUED | OUTPATIENT
Start: 2024-10-31 | End: 2024-11-01

## 2024-10-31 RX ORDER — LORAZEPAM 2 MG/ML
0.5 INJECTION INTRAMUSCULAR EVERY 4 HOURS PRN
Status: DISCONTINUED | OUTPATIENT
Start: 2024-10-31 | End: 2024-11-03

## 2024-10-31 RX ORDER — LORAZEPAM 1 MG/1
1 TABLET ORAL EVERY 4 HOURS PRN
Status: DISCONTINUED | OUTPATIENT
Start: 2024-10-31 | End: 2024-11-03

## 2024-10-31 RX ORDER — AMOXICILLIN 250 MG
2 CAPSULE ORAL 2 TIMES DAILY
Status: DISCONTINUED | OUTPATIENT
Start: 2024-10-31 | End: 2024-11-01

## 2024-10-31 RX ORDER — CHLORDIAZEPOXIDE HYDROCHLORIDE 25 MG/1
50 CAPSULE, GELATIN COATED ORAL EVERY 6 HOURS
Status: COMPLETED | OUTPATIENT
Start: 2024-10-31 | End: 2024-11-01

## 2024-10-31 RX ORDER — LORAZEPAM 2 MG/1
4 TABLET ORAL
Status: DISCONTINUED | OUTPATIENT
Start: 2024-10-31 | End: 2024-11-03

## 2024-10-31 RX ORDER — LORAZEPAM 2 MG/ML
1 INJECTION INTRAMUSCULAR
Status: DISCONTINUED | OUTPATIENT
Start: 2024-10-31 | End: 2024-11-03

## 2024-10-31 RX ORDER — CALCIUM GLUCONATE 20 MG/ML
1 INJECTION, SOLUTION INTRAVENOUS ONCE
Status: COMPLETED | OUTPATIENT
Start: 2024-10-31 | End: 2024-10-31

## 2024-10-31 RX ORDER — DEXTROSE MONOHYDRATE 25 G/50ML
25 INJECTION, SOLUTION INTRAVENOUS
Status: DISCONTINUED | OUTPATIENT
Start: 2024-10-31 | End: 2024-11-01

## 2024-10-31 RX ORDER — ONDANSETRON 4 MG/1
4 TABLET, ORALLY DISINTEGRATING ORAL EVERY 4 HOURS PRN
Status: DISCONTINUED | OUTPATIENT
Start: 2024-10-31 | End: 2024-11-07 | Stop reason: HOSPADM

## 2024-10-31 RX ORDER — LORAZEPAM 2 MG/ML
3 INJECTION INTRAMUSCULAR
Status: DISCONTINUED | OUTPATIENT
Start: 2024-10-31 | End: 2024-10-31

## 2024-10-31 RX ORDER — ACETAMINOPHEN 325 MG/1
650 TABLET ORAL EVERY 6 HOURS PRN
Status: DISCONTINUED | OUTPATIENT
Start: 2024-10-31 | End: 2024-11-07 | Stop reason: HOSPADM

## 2024-10-31 RX ORDER — POLYETHYLENE GLYCOL 3350 17 G/17G
1 POWDER, FOR SOLUTION ORAL
Status: DISCONTINUED | OUTPATIENT
Start: 2024-10-31 | End: 2024-11-01

## 2024-10-31 RX ORDER — LORAZEPAM 0.5 MG/1
0.5 TABLET ORAL EVERY 4 HOURS PRN
Status: DISCONTINUED | OUTPATIENT
Start: 2024-10-31 | End: 2024-11-03

## 2024-10-31 RX ORDER — POTASSIUM CHLORIDE 14.9 MG/ML
20 INJECTION INTRAVENOUS ONCE
Status: COMPLETED | OUTPATIENT
Start: 2024-10-31 | End: 2024-10-31

## 2024-10-31 RX ORDER — LORAZEPAM 2 MG/ML
1 INJECTION INTRAMUSCULAR
Status: DISCONTINUED | OUTPATIENT
Start: 2024-10-31 | End: 2024-10-31

## 2024-10-31 RX ORDER — LORAZEPAM 2 MG/ML
4 INJECTION INTRAMUSCULAR
Status: DISCONTINUED | OUTPATIENT
Start: 2024-10-31 | End: 2024-10-31

## 2024-10-31 RX ORDER — CHLORDIAZEPOXIDE HYDROCHLORIDE 25 MG/1
25 CAPSULE, GELATIN COATED ORAL EVERY 6 HOURS
Status: COMPLETED | OUTPATIENT
Start: 2024-11-01 | End: 2024-11-03

## 2024-10-31 RX ORDER — FOLIC ACID 1 MG/1
1 TABLET ORAL DAILY
Status: COMPLETED | OUTPATIENT
Start: 2024-10-31 | End: 2024-11-03

## 2024-10-31 RX ORDER — LORAZEPAM 2 MG/ML
2 INJECTION INTRAMUSCULAR
Status: DISCONTINUED | OUTPATIENT
Start: 2024-10-31 | End: 2024-11-03

## 2024-10-31 RX ORDER — AZITHROMYCIN 250 MG/1
500 TABLET, FILM COATED ORAL DAILY
Status: COMPLETED | OUTPATIENT
Start: 2024-11-01 | End: 2024-11-01

## 2024-10-31 RX ORDER — INSULIN LISPRO 100 [IU]/ML
3-14 INJECTION, SOLUTION INTRAVENOUS; SUBCUTANEOUS
Status: DISCONTINUED | OUTPATIENT
Start: 2024-10-31 | End: 2024-11-01

## 2024-10-31 RX ORDER — PROMETHAZINE HYDROCHLORIDE 25 MG/1
12.5-25 TABLET ORAL EVERY 4 HOURS PRN
Status: DISCONTINUED | OUTPATIENT
Start: 2024-10-31 | End: 2024-11-07 | Stop reason: HOSPADM

## 2024-10-31 RX ADMIN — THERA TABS 1 TABLET: TAB at 12:38

## 2024-10-31 RX ADMIN — LORAZEPAM 1 MG: 1 TABLET ORAL at 22:20

## 2024-10-31 RX ADMIN — POTASSIUM CHLORIDE 10 MEQ: 7.46 INJECTION, SOLUTION INTRAVENOUS at 12:31

## 2024-10-31 RX ADMIN — DEXTROSE MONOHYDRATE 25 G: 25 INJECTION, SOLUTION INTRAVENOUS at 05:23

## 2024-10-31 RX ADMIN — LORAZEPAM 1 MG: 2 INJECTION INTRAMUSCULAR; INTRAVENOUS at 13:35

## 2024-10-31 RX ADMIN — CHLORDIAZEPOXIDE HYDROCHLORIDE 50 MG: 25 CAPSULE ORAL at 23:56

## 2024-10-31 RX ADMIN — THIAMINE HYDROCHLORIDE 200 MG: 100 INJECTION, SOLUTION INTRAMUSCULAR; INTRAVENOUS at 15:16

## 2024-10-31 RX ADMIN — LORAZEPAM 2 MG: 2 INJECTION INTRAMUSCULAR; INTRAVENOUS at 09:50

## 2024-10-31 RX ADMIN — HYDROMORPHONE HYDROCHLORIDE 1.5 MG: 2 INJECTION INTRAMUSCULAR; INTRAVENOUS; SUBCUTANEOUS at 00:35

## 2024-10-31 RX ADMIN — LORAZEPAM 3 MG: 1 TABLET ORAL at 15:16

## 2024-10-31 RX ADMIN — LORAZEPAM 0.5 MG: 1 TABLET ORAL at 12:31

## 2024-10-31 RX ADMIN — FAMOTIDINE 20 MG: 20 TABLET, FILM COATED ORAL at 05:08

## 2024-10-31 RX ADMIN — ACETAMINOPHEN 650 MG: 325 TABLET ORAL at 06:33

## 2024-10-31 RX ADMIN — HYDROMORPHONE HYDROCHLORIDE 1.5 MG: 2 INJECTION INTRAMUSCULAR; INTRAVENOUS; SUBCUTANEOUS at 05:22

## 2024-10-31 RX ADMIN — ENOXAPARIN SODIUM 40 MG: 100 INJECTION SUBCUTANEOUS at 17:24

## 2024-10-31 RX ADMIN — ACETAMINOPHEN 650 MG: 325 TABLET ORAL at 16:15

## 2024-10-31 RX ADMIN — HYDROMORPHONE HYDROCHLORIDE 1.5 MG: 2 INJECTION INTRAMUSCULAR; INTRAVENOUS; SUBCUTANEOUS at 02:20

## 2024-10-31 RX ADMIN — AZITHROMYCIN DIHYDRATE 500 MG: 250 TABLET ORAL at 05:08

## 2024-10-31 RX ADMIN — POTASSIUM CHLORIDE 20 MEQ: 14.9 INJECTION, SOLUTION INTRAVENOUS at 01:10

## 2024-10-31 RX ADMIN — LORAZEPAM 3 MG: 1 TABLET ORAL at 17:24

## 2024-10-31 RX ADMIN — LORAZEPAM 1 MG: 2 INJECTION INTRAMUSCULAR; INTRAVENOUS at 09:00

## 2024-10-31 RX ADMIN — CALCIUM GLUCONATE 1 G: 20 INJECTION, SOLUTION INTRAVENOUS at 00:44

## 2024-10-31 RX ADMIN — CHLORDIAZEPOXIDE HYDROCHLORIDE 50 MG: 25 CAPSULE ORAL at 17:24

## 2024-10-31 RX ADMIN — THIAMINE HYDROCHLORIDE 200 MG: 100 INJECTION, SOLUTION INTRAMUSCULAR; INTRAVENOUS at 21:24

## 2024-10-31 RX ADMIN — LORAZEPAM 3 MG: 1 TABLET ORAL at 16:17

## 2024-10-31 RX ADMIN — SENNOSIDES AND DOCUSATE SODIUM 2 TABLET: 50; 8.6 TABLET ORAL at 05:08

## 2024-10-31 RX ADMIN — CALCIUM GLUCONATE 1 G: 20 INJECTION, SOLUTION INTRAVENOUS at 07:44

## 2024-10-31 RX ADMIN — CEFTRIAXONE SODIUM 2000 MG: 10 INJECTION, POWDER, FOR SOLUTION INTRAVENOUS at 05:07

## 2024-10-31 RX ADMIN — POTASSIUM CHLORIDE 20 MEQ: 14.9 INJECTION, SOLUTION INTRAVENOUS at 06:52

## 2024-10-31 RX ADMIN — PROPOFOL 5 MCG/KG/MIN: 10 INJECTION, EMULSION INTRAVENOUS at 00:37

## 2024-10-31 RX ADMIN — FOLIC ACID 1 MG: 1 TABLET ORAL at 12:37

## 2024-10-31 RX ADMIN — SENNOSIDES AND DOCUSATE SODIUM 2 TABLET: 50; 8.6 TABLET ORAL at 17:24

## 2024-10-31 RX ADMIN — CHLORDIAZEPOXIDE HYDROCHLORIDE 50 MG: 25 CAPSULE ORAL at 12:29

## 2024-10-31 RX ADMIN — CLONIDINE HYDROCHLORIDE 0.1 MG: 0.1 TABLET ORAL at 22:19

## 2024-10-31 RX ADMIN — THIAMINE HYDROCHLORIDE 200 MG: 100 INJECTION, SOLUTION INTRAMUSCULAR; INTRAVENOUS at 09:06

## 2024-10-31 SDOH — ECONOMIC STABILITY: TRANSPORTATION INSECURITY
IN THE PAST 12 MONTHS, HAS LACK OF RELIABLE TRANSPORTATION KEPT YOU FROM MEDICAL APPOINTMENTS, MEETINGS, WORK OR FROM GETTING THINGS NEEDED FOR DAILY LIVING?: NO

## 2024-10-31 SDOH — ECONOMIC STABILITY: TRANSPORTATION INSECURITY
IN THE PAST 12 MONTHS, HAS THE LACK OF TRANSPORTATION KEPT YOU FROM MEDICAL APPOINTMENTS OR FROM GETTING MEDICATIONS?: NO

## 2024-10-31 ASSESSMENT — SOCIAL DETERMINANTS OF HEALTH (SDOH)
WITHIN THE PAST 12 MONTHS, YOU WORRIED THAT YOUR FOOD WOULD RUN OUT BEFORE YOU GOT THE MONEY TO BUY MORE: NEVER TRUE
WITHIN THE LAST YEAR, HAVE YOU BEEN AFRAID OF YOUR PARTNER OR EX-PARTNER?: NO
WITHIN THE PAST 12 MONTHS, THE FOOD YOU BOUGHT JUST DIDN'T LAST AND YOU DIDN'T HAVE MONEY TO GET MORE: NEVER TRUE
IN THE PAST 12 MONTHS, HAS THE ELECTRIC, GAS, OIL, OR WATER COMPANY THREATENED TO SHUT OFF SERVICE IN YOUR HOME?: NO
WITHIN THE LAST YEAR, HAVE TO BEEN RAPED OR FORCED TO HAVE ANY KIND OF SEXUAL ACTIVITY BY YOUR PARTNER OR EX-PARTNER?: NO
WITHIN THE LAST YEAR, HAVE YOU BEEN KICKED, HIT, SLAPPED, OR OTHERWISE PHYSICALLY HURT BY YOUR PARTNER OR EX-PARTNER?: NO
WITHIN THE LAST YEAR, HAVE YOU BEEN HUMILIATED OR EMOTIONALLY ABUSED IN OTHER WAYS BY YOUR PARTNER OR EX-PARTNER?: NO

## 2024-10-31 ASSESSMENT — COGNITIVE AND FUNCTIONAL STATUS - GENERAL
HELP NEEDED FOR BATHING: TOTAL
TURNING FROM BACK TO SIDE WHILE IN FLAT BAD: A LOT
PERSONAL GROOMING: TOTAL
SUGGESTED CMS G CODE MODIFIER MOBILITY: CM
WALKING IN HOSPITAL ROOM: TOTAL
TOILETING: TOTAL
EATING MEALS: TOTAL
MOVING TO AND FROM BED TO CHAIR: TOTAL
DAILY ACTIVITIY SCORE: 6
DRESSING REGULAR LOWER BODY CLOTHING: TOTAL
DRESSING REGULAR UPPER BODY CLOTHING: TOTAL
MOVING FROM LYING ON BACK TO SITTING ON SIDE OF FLAT BED: A LOT
MOBILITY SCORE: 8
STANDING UP FROM CHAIR USING ARMS: TOTAL
SUGGESTED CMS G CODE MODIFIER DAILY ACTIVITY: CN
CLIMB 3 TO 5 STEPS WITH RAILING: TOTAL

## 2024-10-31 ASSESSMENT — LIFESTYLE VARIABLES
AUDITORY DISTURBANCES: NOT PRESENT
TOTAL SCORE: 6
HAVE YOU EVER FELT YOU SHOULD CUT DOWN ON YOUR DRINKING: YES
TOTAL SCORE: 16
PAROXYSMAL SWEATS: *
ORIENTATION AND CLOUDING OF SENSORIUM: ORIENTED AND CAN DO SERIAL ADDITIONS
ANXIETY: NO ANXIETY (AT EASE)
ORIENTATION AND CLOUDING OF SENSORIUM: CANNOT DO SERIAL ADDITIONS OR IS UNCERTAIN ABOUT DATE
AUDITORY DISTURBANCES: NOT PRESENT
AUDITORY DISTURBANCES: NOT PRESENT
TOTAL SCORE: 2
AGITATION: NORMAL ACTIVITY
TREMOR: SEVERE TREMOR, EVEN WITH ARMS NOT EXTENDED
PAROXYSMAL SWEATS: BARELY PERCEPTIBLE SWEATING. PALMS MOIST
TREMOR: MODERATE TREMOR WITH ARMS EXTENDED
VISUAL DISTURBANCES: NOT PRESENT
ORIENTATION AND CLOUDING OF SENSORIUM: ORIENTED AND CAN DO SERIAL ADDITIONS
AUDITORY DISTURBANCES: NOT PRESENT
EVER FELT BAD OR GUILTY ABOUT YOUR DRINKING: YES
TREMOR: *
TOTAL SCORE: 1
TREMOR: NO TREMOR
AGITATION: NORMAL ACTIVITY
ORIENTATION AND CLOUDING OF SENSORIUM: ORIENTED AND CAN DO SERIAL ADDITIONS
PAROXYSMAL SWEATS: *
NAUSEA AND VOMITING: NO NAUSEA AND NO VOMITING
PAROXYSMAL SWEATS: *
ANXIETY: NO ANXIETY (AT EASE)
TREMOR: *
TREMOR: *
TOTAL SCORE: 3
TREMOR: *
AUDITORY DISTURBANCES: NOT PRESENT
AGITATION: NORMAL ACTIVITY
ALCOHOL_USE: YES
ANXIETY: NO ANXIETY (AT EASE)
AGITATION: NORMAL ACTIVITY
AUDITORY DISTURBANCES: NOT PRESENT
NAUSEA AND VOMITING: NO NAUSEA AND NO VOMITING
AGITATION: NORMAL ACTIVITY
TREMOR: SEVERE TREMOR, EVEN WITH ARMS NOT EXTENDED
VISUAL DISTURBANCES: VERY MILD SENSITIVITY
VISUAL DISTURBANCES: NOT PRESENT
TOTAL SCORE: 17
HEADACHE, FULLNESS IN HEAD: NOT PRESENT
TOTAL SCORE: 5
NAUSEA AND VOMITING: NO NAUSEA AND NO VOMITING
NAUSEA AND VOMITING: NO NAUSEA AND NO VOMITING
TREMOR: *
ORIENTATION AND CLOUDING OF SENSORIUM: ORIENTED AND CAN DO SERIAL ADDITIONS
AGITATION: NORMAL ACTIVITY
VISUAL DISTURBANCES: NOT PRESENT
EVER HAD A DRINK FIRST THING IN THE MORNING TO STEADY YOUR NERVES TO GET RID OF A HANGOVER: NO
ORIENTATION AND CLOUDING OF SENSORIUM: ORIENTED AND CAN DO SERIAL ADDITIONS
VISUAL DISTURBANCES: NOT PRESENT
ANXIETY: MILDLY ANXIOUS
AGITATION: NORMAL ACTIVITY
TOTAL SCORE: 15
DOES PATIENT WANT TO TALK TO SOMEONE ABOUT QUITTING: YES
AGITATION: NORMAL ACTIVITY
ANXIETY: *
AUDITORY DISTURBANCES: NOT PRESENT
HEADACHE, FULLNESS IN HEAD: NOT PRESENT
HEADACHE, FULLNESS IN HEAD: NOT PRESENT
PAROXYSMAL SWEATS: BARELY PERCEPTIBLE SWEATING. PALMS MOIST
HAVE PEOPLE ANNOYED YOU BY CRITICIZING YOUR DRINKING: YES
ORIENTATION AND CLOUDING OF SENSORIUM: ORIENTED AND CAN DO SERIAL ADDITIONS
HEADACHE, FULLNESS IN HEAD: NOT PRESENT
ANXIETY: NO ANXIETY (AT EASE)
TREMOR: *
TOTAL SCORE: 13
AGITATION: NORMAL ACTIVITY
NAUSEA AND VOMITING: MILD NAUSEA WITH NO VOMITING
ON A TYPICAL DAY WHEN YOU DRINK ALCOHOL HOW MANY DRINKS DO YOU HAVE: 3
CONSUMPTION TOTAL: POSITIVE
ANXIETY: *
ANXIETY: MILDLY ANXIOUS
HEADACHE, FULLNESS IN HEAD: NOT PRESENT
NAUSEA AND VOMITING: NO NAUSEA AND NO VOMITING
ANXIETY: NO ANXIETY (AT EASE)
NAUSEA AND VOMITING: NO NAUSEA AND NO VOMITING
ORIENTATION AND CLOUDING OF SENSORIUM: ORIENTED AND CAN DO SERIAL ADDITIONS
VISUAL DISTURBANCES: NOT PRESENT
VISUAL DISTURBANCES: NOT PRESENT
AUDITORY DISTURBANCES: NOT PRESENT
ANXIETY: *
PAROXYSMAL SWEATS: NO SWEAT VISIBLE
AUDITORY DISTURBANCES: NOT PRESENT
NAUSEA AND VOMITING: NO NAUSEA AND NO VOMITING
TOTAL SCORE: 8
HEADACHE, FULLNESS IN HEAD: NOT PRESENT
ORIENTATION AND CLOUDING OF SENSORIUM: ORIENTED AND CAN DO SERIAL ADDITIONS
PAROXYSMAL SWEATS: BARELY PERCEPTIBLE SWEATING. PALMS MOIST
PAROXYSMAL SWEATS: BEADS OF SWEAT OBVIOUS ON FOREHEAD
HEADACHE, FULLNESS IN HEAD: NOT PRESENT
HEADACHE, FULLNESS IN HEAD: NOT PRESENT
AUDITORY DISTURBANCES: NOT PRESENT
HOW MANY TIMES IN THE PAST YEAR HAVE YOU HAD 5 OR MORE DRINKS IN A DAY: 5
AGITATION: NORMAL ACTIVITY
VISUAL DISTURBANCES: NOT PRESENT
TOTAL SCORE: 3
VISUAL DISTURBANCES: NOT PRESENT
ANXIETY: NO ANXIETY (AT EASE)
PAROXYSMAL SWEATS: NO SWEAT VISIBLE
PAROXYSMAL SWEATS: *
HEADACHE, FULLNESS IN HEAD: NOT PRESENT
VISUAL DISTURBANCES: NOT PRESENT
TOTAL SCORE: 3
VISUAL DISTURBANCES: NOT PRESENT
AUDITORY DISTURBANCES: NOT PRESENT
HEADACHE, FULLNESS IN HEAD: NOT PRESENT
DOES PATIENT WANT TO STOP DRINKING: YES
TREMOR: MODERATE TREMOR WITH ARMS EXTENDED
ORIENTATION AND CLOUDING OF SENSORIUM: ORIENTED AND CAN DO SERIAL ADDITIONS
AGITATION: NORMAL ACTIVITY
TOTAL SCORE: 3
TOTAL SCORE: 3
HEADACHE, FULLNESS IN HEAD: NOT PRESENT
AVERAGE NUMBER OF DAYS PER WEEK YOU HAVE A DRINK CONTAINING ALCOHOL: 7
PAROXYSMAL SWEATS: NO SWEAT VISIBLE
ORIENTATION AND CLOUDING OF SENSORIUM: ORIENTED AND CAN DO SERIAL ADDITIONS
NAUSEA AND VOMITING: NO NAUSEA AND NO VOMITING

## 2024-10-31 ASSESSMENT — COPD QUESTIONNAIRES
DURING THE PAST 4 WEEKS HOW MUCH DID YOU FEEL SHORT OF BREATH: NONE/LITTLE OF THE TIME
HAVE YOU SMOKED AT LEAST 100 CIGARETTES IN YOUR ENTIRE LIFE: YES
DO YOU EVER COUGH UP ANY MUCUS OR PHLEGM?: NO/ONLY WITH OCCASIONAL COLDS OR INFECTIONS
COPD SCREENING SCORE: 3

## 2024-10-31 ASSESSMENT — ENCOUNTER SYMPTOMS
MYALGIAS: 1
ABDOMINAL PAIN: 0
HEADACHES: 1
DIARRHEA: 0
HEADACHES: 0
BACK PAIN: 0
FEVER: 0
DIZZINESS: 0
NAUSEA: 0
LOSS OF CONSCIOUSNESS: 0
COUGH: 0
VOMITING: 0
CHILLS: 0
SHORTNESS OF BREATH: 0
PALPITATIONS: 0
TREMORS: 1
NERVOUS/ANXIOUS: 1

## 2024-10-31 ASSESSMENT — PAIN DESCRIPTION - PAIN TYPE
TYPE: ACUTE PAIN

## 2024-10-31 ASSESSMENT — PULMONARY FUNCTION TESTS: FVC: 1.9

## 2024-10-31 ASSESSMENT — PATIENT HEALTH QUESTIONNAIRE - PHQ9
1. LITTLE INTEREST OR PLEASURE IN DOING THINGS: NOT AT ALL
2. FEELING DOWN, DEPRESSED, IRRITABLE, OR HOPELESS: NOT AT ALL
SUM OF ALL RESPONSES TO PHQ9 QUESTIONS 1 AND 2: 0

## 2024-10-31 ASSESSMENT — FIBROSIS 4 INDEX: FIB4 SCORE: 5.02

## 2024-10-31 NOTE — ASSESSMENT & PLAN NOTE
Pattern consistent with alcohol which fits with the patient's history  CT imaging shows hepatic steatosis, no other pathology seen  LFTs are trending down  Encourage alcohol cessation  Continue to monitor

## 2024-10-31 NOTE — ASSESSMENT & PLAN NOTE
Blood level greater than 400  Encourage alcohol cessation  IV thiamine load  P.o. folate and MVI  Starting to withdrawal

## 2024-10-31 NOTE — ASSESSMENT & PLAN NOTE
Acute alcohol intoxication with blood level greater than 400, and hypothermia.  Now that these issues have been addressed, the patient is awake alert and interactive

## 2024-10-31 NOTE — ASSESSMENT & PLAN NOTE
Likely related to the patient's alcohol intake  IV thiamine and p.o. folate  Dietary consultation to improve nutritional status

## 2024-10-31 NOTE — RESPIRATORY CARE
Pt extubated at 7:41 to 8L oxymask  1.9L, -65nif, RSBI 34  Cuff leak detected    Pt tolerating well

## 2024-10-31 NOTE — ASSESSMENT & PLAN NOTE
Patient intubated for airway protection in setting of altered mentation.  Now that the patient is thinking clearly, and with a normal level of consciousness, he is protecting his airway and oxygenating.  Continue O2 and RT protocols  Mobilize  Encourage smoking cessation

## 2024-10-31 NOTE — PROGRESS NOTES
"Critical Care Daily Progress Note    Date of Service  10/31/2024    Chief Complaint  Jethro Cook is a 49 y.o. male admitted 10/30/2024 with acute alcohol intoxication, hypothermia and hyperglycemia.     Hospital Course  \"All history was obtained from ER staff as well as an extensive chart review as the patient was intubated at the time of my evaluation.     Jethro Cook is a 49 y.o. male who who receives his care at the [facility] with the past medical history significant for alcohol abuse, hyperlipidemia, and CAD s/p MI who was found down by his family with altered mentation on 10/30/2024.  His last known well was 24 hours prior.  EMS was activated and the patient was found to have abrasions to his left head and a low GCS with a \"blow left pupil\".  EMS intubated the patient with Rocuronium 200mg IV, Ketamine 200mgIV, Fentanyl 50mcg, and also was given a 300cc bolus of 3% for transport.  Upon arrival to [facility], the patient was deemed and trauma patient.  The patient underwent trauma evaluation and was found to have left eye injury, but otherwise no trauma injuries and thus the critical care team was consulted.       The patient was hypotensive upon arrival requiring a sepsis bolus of 2.2 liters of IVF as well as broad spectrum antibiotics with vasopressor therapies.  His blood alcohol level was 400 with a blood glucose level of >600.\"   Taken from Dr Lao's admission HPI       Interval Problem Update  Reviewed last 24 hour events:              - Acute Overnight events: Extubated this morning, started ativan for etOH w/d, diet started, removing RIJ CVC   - Tm: 37.6   - Hr: 109   - SBP: 158   - Vasoactive Drips: None   - RASS: 1 Sedation: Propofol gtt Analgesia: Dilaudid gtt   - Vent settings and day: APVC, 22/420/10/70% SAT/SBT: Yes - extubated this morning               - Lines/Tubes: RIJ CVC, pIVs              - DVT Ppx: Lovenox   - Nutrition: Regular diet, Bowel Reg: PRN, GI PPX: H2A   - Glucose control: In " range   - Abx: Azithromycin/ceftriaxone   - Mobility: 3A    I have discussed this patient's plan of care and discharge plan at IDT rounds today with Case Management, Nursing, Nursing leadership, and other members of the IDT team.    Code Status  Full Code    Disposition  The patient is not medically cleared for discharge to home or a post-acute facility.      I have placed the appropriate orders for post-discharge needs.    Review of Systems  Review of Systems   Unable to perform ROS: Critical illness        Physical Exam  Temp:  [26.2 °C (79.2 °F)-27.5 °C (81.5 °F)] 27.5 °C (81.5 °F)  Pulse:  [] 102  Resp:  [14-33] 22  BP: ()/() 86/51  SpO2:  [88 %-100 %] 99 %    Physical Exam  Constitutional:       Appearance: He is obese. He is diaphoretic.   HENT:      Head: Normocephalic.      Comments: Abrasion on forehead  Cardiovascular:      Rate and Rhythm: Regular rhythm. Tachycardia present.      Heart sounds: Normal heart sounds.   Pulmonary:      Effort: Pulmonary effort is normal.      Breath sounds: Normal breath sounds.   Abdominal:      General: Abdomen is flat.      Palpations: Abdomen is soft.   Neurological:      Mental Status: He is alert and oriented to person, place, and time.      Comments: L pupil chronically fixed and dilated          Fluids    Intake/Output Summary (Last 24 hours) at 10/31/2024 0602  Last data filed at 10/31/2024 0400  Gross per 24 hour   Intake 3017.78 ml   Output 1925 ml   Net 1092.78 ml        Laboratory  Recent Labs     10/30/24  1057 10/31/24  0515   WBC 2.3* 4.7*   RBC 3.26* 3.14*   HEMOGLOBIN 11.1* 10.7*   HEMATOCRIT 35.0* 33.2*   .4* 105.7*   MCH 34.0* 34.1*   MCHC 31.7* 32.2*   RDW 60.3* 60.0*   PLATELETCT 173 168   MPV 10.4 10.1     Recent Labs     10/30/24  1740 10/30/24  2343 10/31/24  0515   SODIUM 144 146* 147*   POTASSIUM 3.6 3.9 3.9   CHLORIDE 103 105 107   CO2 16* 21 22   GLUCOSE 82 89 62*   BUN 8 9 9   CREATININE 0.65 0.67 1.02   CALCIUM 7.6*  7.9* 7.9*     Recent Labs     10/30/24  1057 10/30/24  1500   APTT 33.8  --    INR 1.02 1.04               Imaging  DX-CHEST-PORTABLE (1 VIEW)   Final Result      Bilateral infiltrates, left worse than right.      EC-ECHOCARDIOGRAM COMPLETE W/ CONT   Final Result      DX-CHEST-LIMITED (1 VIEW)   Final Result         1. Interval placement of a right internal jugular central venous access catheter terminating over the superior vena cava. No postprocedure visible pneumothorax.   2. Adjustment of the endotracheal and nasogastric tubes, now appropriately positioned.   3. Resolution of gastric distention with air.      CT-LSPINE W/O PLUS RECONS   Final Result      1. No acute osseous abnormality involving the lumbar spine.   2. L5-S1 degenerative disc disease and facet arthrosis.   3. For imaging findings regarding the remainder of the abdomen and pelvis, please see CT of the chest, abdomen and pelvis performed the same day.      CT-TSPINE W/O PLUS RECONS   Final Result      1. No acute posttraumatic imaging findings in the thoracic spine.   2. Appropriate positions of the endotracheal and nasogastric tubes.   3. For the imaging findings in the remainder of the chest and abdomen, please see CT chest, abdomen and pelvis performed the same day.      CT-CHEST,ABDOMEN,PELVIS WITH   Final Result      1.  BILATERAL lower lobe atelectasis. Superimposed pneumonia not excluded   2.  Edema in the retroperitoneum could indicate pancreatitis, gastritis or duodenitis   3.  Hepatic steatosis   4.  Trace ascites   5.  LEFT inguinal hernia contains fat   6.  Thyroid gland is enlarged and heterogenous, recommend correlation with laboratory studies and ultrasound when clinically appropriate      CT-CSPINE WITHOUT PLUS RECONS   Final Result      1.  No acute abnormality identified.   2.  Multilevel multifactorial degenerative changes   3.  Heterogeneous enlarged thyroid, recommend correlation with laboratory studies and ultrasound when  clinically appropriate      CT-MAXILLOFACIAL W/O PLUS RECONS   Final Result      1. No acute facial bone fracture.   2. The lens of the left globe is displaced posteriorly into the vitreous humor, best seen on series 9 image 108. It was appropriately positioned on the prior exam performed on 1/28/2024.   3. Intracranial atherosclerotic calcifications.      CT-HEAD W/O   Final Result   Addendum (preliminary) 1 of 1   LEFT globe injury with displaced LEFT lung is better seen on the    maxillofacial CT performed at the same time      Final      No acute intracranial abnormality.            US-ABDOMEN F.A.S.T. LTD (FOR ED USE ONLY)   Final Result      No free fluid seen in all 4 quadrants.      Negative FAST scan.            DX-CHEST-LIMITED (1 VIEW)   Final Result         1. The endotracheal tube terminates at the lorenza; withdrawal 4 cm is recommended.   2. Advancement of the nasogastric tube 20 cm is recommended. There is gastric distention with air.   3. Low lung volumes.      DX-PELVIS-1 OR 2 VIEWS   Final Result      No           Assessment/Plan  * Acute encephalopathy- (present on admission)  Assessment & Plan  ?due to hypothermia vs hypoxia vs shock vs alcohol intoxication  CT head  unremarkable  UDS: (+)benzos and barbituates  BAL of 400  Check ammonia  Cont to correct all underlying metabolic abnormalities   Waking up well post extubation this morning     Macrocytic anemia- (present on admission)  Assessment & Plan  No obvious acute blood loss  Conservative transfusion protocols for Hb<7    Hyponatremia- (present on admission)  Assessment & Plan  Pseudo-->corrected to 139 for elevated glucose     Electrolyte abnormality- (present on admission)  Assessment & Plan  Replete K to goal > 4, KCl 40meq IV now  Replete Mg to goal > 2, MgSO4 2g IV now  Replete Ca to goal > 8, Ca gluconate 1g IV in ER  Monitor phosphorus after starting enteral feeds    Lactic acidosis- (present on admission)  Assessment & Plan  Due to  shock  S/p IVF resuscitation  Trending down    Hypothermia- (present on admission)  Assessment & Plan  ?due to exposure vs sepsis  Active warming measures ongoing   Resolved     Shock (HCC)- (present on admission)  Assessment & Plan  Suspect due to sepsis/aspiration pna vs CAP  S/p sepsis bolus of 2.2 liters with elevated lactic acid and respiratory failure  Trend lactic acid  MAP goals > 65  I am actively titrating levophed for MAP goals  Actively warming patient for hypothermia  Follow up on cultures   Check cortisol and procalcitonin  ECHO pending     Hyperglycemia- (present on admission)  Assessment & Plan  ?due to acute alcoholism  Check HbA1c  BG goals 120-180s  High ISS  Sugars in range    Acute alcoholic intoxication with complication (HCC)- (present on admission)  Assessment & Plan  Hx of binge drinking and abuse  BAL of 409  Monitor for withdrawal in the next 72 hours  Rally bag in ER - cont IV thiamine 200 mg TID  Cont to replete all electrolytes  Ativan for etOH w/d      Acute respiratory failure with hypoxia (HCC)- (present on admission)  Assessment & Plan  Intubated in field on 10/30 for airway protection/hypoxia  Extubated successfully on morning of 10/31  ECHO pending  Broad spectrum abx for possible aspiration with ceftriaxone/azithromycin  Still requiring 8L face mask     Pure hypercholesterolemia- (present on admission)  Assessment & Plan  Hx of     Primary hypertension- (present on admission)  Assessment & Plan  Holding home BP medications while on vasopressors     Transaminitis- (present on admission)  Assessment & Plan  Suspect due to alcoholic hepatitis  Trending  Check ammonia     NSTEMI (non-ST elevated myocardial infarction) (HCC)- (present on admission)  Assessment & Plan  Hx of          VTE prophylaxis: VTE Selection    I have performed a physical exam and reviewed and updated ROS and Plan today (10/31/2024). In review of yesterday's note (10/30/2024), there are no changes except as  documented above.      The patient remains critically ill. Critical care time = 40 mins in directly providing and coordinating critical care and extensive data review. No time overlap and excludes procedures.

## 2024-10-31 NOTE — CONSULTS
"Hospital Medicine Consultation    Date of Service  10/31/2024    Referring Physician  Morris Hall M.D.    Consulting Physician  Masood Aguilar D.O.    Reason for Consultation  Altered mental status    History of Presenting Illness  49 y.o. male who presented 10/30/2024 with a history of ETOH abuse, HLD, tobacco use, CAD.  Found down by family last seen 24hrs previously.  Intubated by EMS.  BAL >400, , LA 7.9, , core temp 27.5c.  extubated 10/29    On my examination the patient states he feels fine.  He does not recall what happened prior to his presentation here, just remembers waking up here in the hospital.  He does admit to drinking \"a lot\" but is reluctant to specify.  He thinks that he was feeling okay prior to being found down, he does not recall feeling of fever or headache or any other symptoms.  At present he does feel \"a little jittery\".    Review of Systems  Review of Systems   Constitutional:  Negative for chills and fever.   Respiratory:  Negative for cough and shortness of breath.    Cardiovascular:  Negative for chest pain, palpitations and leg swelling.   Gastrointestinal:  Negative for abdominal pain, diarrhea, nausea and vomiting.   Genitourinary:  Negative for dysuria and urgency.   Musculoskeletal:  Negative for back pain.   Skin:  Negative for rash.   Neurological:  Negative for dizziness, loss of consciousness and headaches.       Past Medical History   has no past medical history on file.    Surgical History   has no past surgical history on file.    Family History  family history is not on file.    Social History   reports that he has been smoking cigarettes. He has never used smokeless tobacco. He reports current alcohol use of about 1.8 oz of alcohol per week. He reports that he does not currently use drugs.    Medications  Prior to Admission Medications   Prescriptions Last Dose Informant Patient Reported? Taking?   Melatonin 10 MG Tab UNK at [facility]K Patient's " Home Pharmacy Yes Yes   Sig: Take 10 mg by mouth at bedtime.   amLODIPine (NORVASC) 10 MG Tab UNK at [facility] Patient's Home Pharmacy No No   Sig: Take 1 Tablet by mouth every day.   atorvastatin (LIPITOR) 40 MG Tab UNK at [facility] Patient's Home Pharmacy No No   Sig: Take 1 Tablet by mouth every evening.   carvedilol (COREG) 6.25 MG Tab UNK at [facility] Patient's Home Pharmacy No No   Sig: Take 1 Tablet by mouth 2 times a day with meals.   clopidogrel (PLAVIX) 75 MG Tab UNK at [facility] Patient's Home Pharmacy No No   Sig: Take 1 Tablet by mouth every day.   cyanocobalamin (VITAMIN B-12) 500 MCG Tab UNK at [facility] Patient's Home Pharmacy Yes No   Sig: Take 500 mcg by mouth every day.   fluoxetine (PROZAC) 40 MG capsule UNK at [facility] Patient's Home Pharmacy Yes Yes   Sig: Take 40 mg by mouth every day.   gabapentin (NEURONTIN) 300 MG Cap UNK at [facility] Patient's Home Pharmacy Yes No   Sig: Take 300 mg by mouth 3 times a day as needed (MOOD/PAIN).   hydrOXYzine HCl (ATARAX) 25 MG Tab UNK at [facility] Patient's Home Pharmacy Yes Yes   Sig: Take 25 mg by mouth at bedtime.   losartan (COZAAR) 100 MG Tab UNK at [facility] Patient's Home Pharmacy Yes Yes   Sig: Take 100 mg by mouth every day.   naltrexone (DEPADE) 50 MG Tab UNK at [facility] Patient's Home Pharmacy Yes Yes   Sig: Take 50 mg by mouth every day.   nicotine polacrilex (COMMIT) 2 MG lozenge UNK at [facility] Patient's Home Pharmacy Yes Yes   Sig: Place 2 mg into mouth between cheek and gum every 2 hours as needed (nicotine dependence).   traZODone (DESYREL) 100 MG Tab UNK at [facility] Patient's Home Pharmacy Yes Yes   Sig: Take 100 mg by mouth at bedtime.   vitamin D (CHOLECALCIFEROL) 1000 Unit Tab UNK at [facility] Patient's Home Pharmacy Yes No   Sig: Take 1,000 Units by mouth every day.      Facility-Administered Medications: None       Allergies  Allergies   Allergen Reactions    Lisinopril Unspecified     Mood swings        Physical Exam  Temp:  [38.7 °C (101.7 °F)] 38.7 °C (101.7 °F)  Pulse:  [] 130  Resp:  [20-33]  25  BP: ()/() 155/80  SpO2:  [88 %-100 %] 91 %    Physical Exam  Constitutional:       General: He is not in acute distress.     Appearance: He is well-developed. He is not diaphoretic.   HENT:      Head: Normocephalic and atraumatic.   Eyes:      Conjunctiva/sclera: Conjunctivae normal.   Neck:      Vascular: No JVD.   Cardiovascular:      Rate and Rhythm: Tachycardia present.      Heart sounds: No murmur heard.     No gallop.   Pulmonary:      Effort: Pulmonary effort is normal. No respiratory distress.      Breath sounds: No stridor. No wheezing or rales.   Abdominal:      Palpations: Abdomen is soft.      Tenderness: There is no abdominal tenderness. There is no guarding or rebound.   Musculoskeletal:      Right lower leg: No edema.      Left lower leg: No edema.   Skin:     General: Skin is warm and dry.      Findings: No rash.   Neurological:      Mental Status: He is alert and oriented to person, place, and time.      Cranial Nerves: Cranial nerves 2-12 are intact.      Motor: Tremor present. No weakness.      Comments: Speech clear content logical  Face symmetric  EOMI  Tongue midline  Positive antigravity strength, with tremor in bilateral hands   Psychiatric:         Thought Content: Thought content normal.         Fluids  Date 10/31/24 0700 - 11/01/24 0659   Shift 1115-9279 3249-1193 8472-6339 24 Hour Total   INTAKE   IV Piggyback 93.8   93.8   Shift Total 93.8   93.8   OUTPUT   Urine 150   150   Shift Total 150   150   Weight (kg) 124 124 124 124       Laboratory  Recent Labs     10/30/24  1057 10/31/24  0515   WBC 2.3* 4.7*   RBC 3.26* 3.14*   HEMOGLOBIN 11.1* 10.7*   HEMATOCRIT 35.0* 33.2*   .4* 105.7*   MCH 34.0* 34.1*   MCHC 31.7* 32.2*   RDW 60.3* 60.0*   PLATELETCT 173 168   MPV 10.4 10.1     Recent Labs     10/30/24  1740 10/30/24  2343 10/31/24  0515   SODIUM 144 146* 147*   POTASSIUM 3.6 3.9 3.9   CHLORIDE 103 105 107   CO2 16* 21 22   GLUCOSE 82 89 62*   BUN 8 9 9    CREATININE 0.65 0.67 1.02   CALCIUM 7.6* 7.9* 7.9*     Recent Labs     10/30/24  1057 10/30/24  1500   APTT 33.8  --    INR 1.02 1.04                 Imaging  DX-CHEST-PORTABLE (1 VIEW)   Final Result      Bilateral infiltrates, left worse than right.      EC-ECHOCARDIOGRAM COMPLETE W/ CONT   Final Result      DX-CHEST-LIMITED (1 VIEW)   Final Result         1. Interval placement of a right internal jugular central venous access catheter terminating over the superior vena cava. No postprocedure visible pneumothorax.   2. Adjustment of the endotracheal and nasogastric tubes, now appropriately positioned.   3. Resolution of gastric distention with air.      CT-LSPINE W/O PLUS RECONS   Final Result      1. No acute osseous abnormality involving the lumbar spine.   2. L5-S1 degenerative disc disease and facet arthrosis.   3. For imaging findings regarding the remainder of the abdomen and pelvis, please see CT of the chest, abdomen and pelvis performed the same day.      CT-TSPINE W/O PLUS RECONS   Final Result      1. No acute posttraumatic imaging findings in the thoracic spine.   2. Appropriate positions of the endotracheal and nasogastric tubes.   3. For the imaging findings in the remainder of the chest and abdomen, please see CT chest, abdomen and pelvis performed the same day.      CT-CHEST,ABDOMEN,PELVIS WITH   Final Result      1.  BILATERAL lower lobe atelectasis. Superimposed pneumonia not excluded   2.  Edema in the retroperitoneum could indicate pancreatitis, gastritis or duodenitis   3.  Hepatic steatosis   4.  Trace ascites   5.  LEFT inguinal hernia contains fat   6.  Thyroid gland is enlarged and heterogenous, recommend correlation with laboratory studies and ultrasound when clinically appropriate      CT-CSPINE WITHOUT PLUS RECONS   Final Result      1.  No acute abnormality identified.   2.  Multilevel multifactorial degenerative changes   3.  Heterogeneous enlarged thyroid, recommend correlation  with laboratory studies and ultrasound when clinically appropriate      CT-MAXILLOFACIAL W/O PLUS RECONS   Final Result      1. No acute facial bone fracture.   2. The lens of the left globe is displaced posteriorly into the vitreous humor, best seen on series 9 image 108. It was appropriately positioned on the prior exam performed on 1/28/2024.   3. Intracranial atherosclerotic calcifications.      CT-HEAD W/O   Final Result   Addendum (preliminary) 1 of 1   LEFT globe injury with displaced LEFT lung is better seen on the    maxillofacial CT performed at the same time      Final      No acute intracranial abnormality.            US-ABDOMEN F.A.S.T. LTD (FOR ED USE ONLY)   Final Result      No free fluid seen in all 4 quadrants.      Negative FAST scan.            DX-CHEST-LIMITED (1 VIEW)   Final Result         1. The endotracheal tube terminates at the lorenza; withdrawal 4 cm is recommended.   2. Advancement of the nasogastric tube 20 cm is recommended. There is gastric distention with air.   3. Low lung volumes.      DX-PELVIS-1 OR 2 VIEWS   Final Result      No          Assessment/Plan  * Acute encephalopathy- (present on admission)  Assessment & Plan  Acute alcohol intoxication with blood level greater than 400, and hypothermia.  Now that these issues have been addressed, the patient is awake alert and interactive    Alcohol withdrawal syndrome without complication (HCC)  Assessment & Plan  Patient is starting to exhibit signs of withdrawal  Start scheduled Librium for seizure prophylaxis  Start benzodiazepine withdrawal program  Seizure precautions  Close monitoring of mental status and airway    Macrocytic anemia- (present on admission)  Assessment & Plan  Likely related to the patient's alcohol intake  IV thiamine and p.o. folate  Dietary consultation to improve nutritional status    Hyponatremia- (present on admission)  Assessment & Plan  Insetting of glucose greater than 600.  Corrected into the normal  range keeping glucose in mind    Electrolyte abnormality- (present on admission)  Assessment & Plan  Following and replacing daily magnesium, phosphorus, potassium    Lactic acidosis- (present on admission)  Assessment & Plan  Alcohol versus malnutrition versus hypovolemic versus seizure  Resolved    Hypothermia- (present on admission)  Assessment & Plan  Patient was hypothermic after being altered and found down  Now s/p rewarming    Shock (HCC)- (present on admission)  Assessment & Plan  Patient came in hypothermic  Has responded to fluid resuscitation and rewarming     Hyperglycemia- (present on admission)  Assessment & Plan  Glycohemoglobin 4.8  Question stress response  Continue to monitor    Acute alcoholic intoxication with complication (HCC)- (present on admission)  Assessment & Plan  Blood level greater than 400  Encourage alcohol cessation  IV thiamine load  P.o. folate and MVI  Starting to withdrawal    Acute respiratory failure with hypoxia (HCC)- (present on admission)  Assessment & Plan  Patient intubated for airway protection in setting of altered mentation.  Now that the patient is thinking clearly, and with a normal level of consciousness, he is protecting his airway and oxygenating.  Continue O2 and RT protocols  Mobilize  Encourage smoking cessation    Primary hypertension- (present on admission)  Assessment & Plan  As outpatient was maintained on losartan 100 mg daily, Coreg 6.25 mg twice daily, and amlodipine 10 mg daily  Currently going through withdrawals so titration of his chronic outpatient medications is best left to a later time when he is no longer in DTs    Transaminitis- (present on admission)  Assessment & Plan  Pattern consistent with alcohol which fits with the patient's history  CT imaging shows hepatic steatosis, no other pathology seen  LFTs are trending down  Encourage alcohol cessation  Continue to monitor    NSTEMI (non-ST elevated myocardial infarction) (HCC)- (present on  admission)  Assessment & Plan  Insetting of acute hypothermia.  EKG was nonischemic  Echo was negative for wall motion abnormalities

## 2024-10-31 NOTE — DISCHARGE PLANNING
Care Transition Team Assessment    Information Source  Orientation Level: Oriented X4  Information Given By: Patient    Readmission Evaluation  Is this a readmission?: No    Elopement Risk  Legal Hold: No  Ambulatory or Self Mobile in Wheelchair: No-Not an Elopement Risk  Elopement Risk: Not at Risk for Elopement    Discharge Preparedness  What is your plan after discharge?: Home with help  What are your discharge supports?: Child, Sibling  Prior Functional Level: Ambulatory, Independent with Activities of Daily Living, Independent with Medication Management  Difficulity with ADLs: None  Difficulity with IADLs: Driving  Difficulity with IADL Comments: Uses public transportation and/or friends    Functional Assesment  Prior Functional Level: Ambulatory, Independent with Activities of Daily Living, Independent with Medication Management    Finances  Financial Barriers to Discharge: No  Prescription Coverage: Yes    Psychological Assessment  History of Substance Abuse: Alcohol    Discharge Risks or Barriers  Discharge risks or barriers?: Substance abuse, Lives alone, no community support  Patient risk factors: Cognitive / sensory / physical deficit, Lack of outside supports, Lives alone and no community support, Noncompliance, Readmission, Substance abuse, Vulnerable adult    Anticipated Discharge Information  Discharge Disposition: Discharged to home/self care (01)      CM reviewed chart and Jethro was discussed in IDT rounds.  CM met with Jethro at bedside.  He is AOX4, states he is tired.    Jethro lives by himself, in a single level home with no steps.  He has a bath tub combo, no grab bars.  He tells me that he shops and cooks for himself.  He does not drive and has friends that will drive him.    He does use a CPAP but no O2.  DME with [employer].

## 2024-10-31 NOTE — HOSPITAL COURSE
"\"All history was obtained from ER staff as well as an extensive chart review as the patient was intubated at the time of my evaluation.     Jethro Cook is a 49 y.o. male who who receives his care at the [facility] with the past medical history significant for alcohol abuse, hyperlipidemia, and CAD s/p MI who was found down by his family with altered mentation on 10/30/2024.  His last known well was 24 hours prior.  EMS was activated and the patient was found to have abrasions to his left head and a low GCS with a \"blow left pupil\".  EMS intubated the patient with Rocuronium 200mg IV, Ketamine 200mgIV, Fentanyl 50mcg, and also was given a 300cc bolus of 3% for transport.  Upon arrival to [facility], the patient was deemed and trauma patient.  The patient underwent trauma evaluation and was found to have left eye injury, but otherwise no trauma injuries and thus the critical care team was consulted.       The patient was hypotensive upon arrival requiring a sepsis bolus of 2.2 liters of IVF as well as broad spectrum antibiotics with vasopressor therapies.  His blood alcohol level was 400 with a blood glucose level of >600.\"   Taken from Dr Lao's admission HPI     "

## 2024-10-31 NOTE — PROGRESS NOTES
Trauma / Surgical Daily Progress Note    Date of Service  10/31/2024    Chief Complaint  49 y.o. male admitted 10/30/2024 after being found down with blood alcohol level of 0.4    Interval Events  Admitted to medicine service with CIWA precautions in place.  Tertiary survey completed with no further findings      -Trauma surgery will sign off at this time    Review of Systems  Review of Systems   Constitutional:  Positive for malaise/fatigue.   Respiratory:  Negative for cough and shortness of breath.    Gastrointestinal:  Negative for abdominal pain, nausea and vomiting.   Genitourinary:  Negative for dysuria and urgency.   Musculoskeletal:  Positive for myalgias.   Neurological:  Positive for tremors and headaches.   Psychiatric/Behavioral:  The patient is nervous/anxious.         Vital Signs  Temp:  [26.2 °C (79.2 °F)-27.5 °C (81.5 °F)] 27.5 °C (81.5 °F)  Pulse:  [] 110  Resp:  [14-33] 22  BP: ()/() 93/52  SpO2:  [88 %-100 %] 99 %    Physical Exam  Physical Exam  Constitutional:       Appearance: He is diaphoretic.   HENT:      Head: Normocephalic.      Comments: Large forehead abrasion mostly to right side of forehead     Right Ear: External ear normal.      Left Ear: External ear normal.      Mouth/Throat:      Mouth: Mucous membranes are moist.   Eyes:      Comments: Left pupil unequal   Cardiovascular:      Rate and Rhythm: Tachycardia present.   Pulmonary:      Effort: Pulmonary effort is normal.      Comments: Supplemental oxygen  Abdominal:      Palpations: Abdomen is soft.   Genitourinary:     Comments: Meredith in place with dark diamond urine  Skin:     General: Skin is warm.      Capillary Refill: Capillary refill takes less than 2 seconds.      Coloration: Skin is not jaundiced.         Laboratory  Recent Results (from the past 24 hour(s))   DIAGNOSTIC ALCOHOL    Collection Time: 10/30/24 10:57 AM   Result Value Ref Range    Diagnostic Alcohol 409.0 (HH) <10.1 mg/dL   CBC WITHOUT  DIFFERENTIAL    Collection Time: 10/30/24 10:57 AM   Result Value Ref Range    WBC 2.3 (L) 4.8 - 10.8 K/uL    RBC 3.26 (L) 4.70 - 6.10 M/uL    Hemoglobin 11.1 (L) 14.0 - 18.0 g/dL    Hematocrit 35.0 (L) 42.0 - 52.0 %    .4 (H) 81.4 - 97.8 fL    MCH 34.0 (H) 27.0 - 33.0 pg    MCHC 31.7 (L) 32.3 - 36.5 g/dL    RDW 60.3 (H) 35.9 - 50.0 fL    Platelet Count 173 164 - 446 K/uL    MPV 10.4 9.0 - 12.9 fL   Comp Metabolic Panel    Collection Time: 10/30/24 10:57 AM   Result Value Ref Range    Sodium 127 (L) 135 - 145 mmol/L    Potassium 2.5 (LL) 3.6 - 5.5 mmol/L    Chloride 89 (L) 96 - 112 mmol/L    Co2 17 (L) 20 - 33 mmol/L    Anion Gap 21.0 (H) 7.0 - 16.0    Glucose 611 (HH) 65 - 99 mg/dL    Bun 8 8 - 22 mg/dL    Creatinine 0.68 0.50 - 1.40 mg/dL    Calcium 6.6 (LL) 8.5 - 10.5 mg/dL    Correct Calcium 7.4 (L) 8.5 - 10.5 mg/dL    AST(SGOT) 139 (H) 12 - 45 U/L    ALT(SGPT) 71 (H) 2 - 50 U/L    Alkaline Phosphatase 177 (H) 30 - 99 U/L    Total Bilirubin 0.8 0.1 - 1.5 mg/dL    Albumin 3.0 (L) 3.2 - 4.9 g/dL    Total Protein 5.1 (L) 6.0 - 8.2 g/dL    Globulin 2.1 1.9 - 3.5 g/dL    A-G Ratio 1.4 g/dL   Prothrombin Time    Collection Time: 10/30/24 10:57 AM   Result Value Ref Range    PT 13.4 12.0 - 14.6 sec    INR 1.02 0.87 - 1.13   APTT    Collection Time: 10/30/24 10:57 AM   Result Value Ref Range    APTT 33.8 24.7 - 36.0 sec   LACTIC ACID    Collection Time: 10/30/24 10:57 AM   Result Value Ref Range    Lactic Acid 7.9 (HH) 0.5 - 2.0 mmol/L   COD - Adult (Type and Screen)    Collection Time: 10/30/24 10:57 AM   Result Value Ref Range    ABO Grouping Only A     Rh Grouping Only POS     Antibody Screen-Cod NEG    CREATINE KINASE    Collection Time: 10/30/24 10:57 AM   Result Value Ref Range    CPK Total 349 (H) 0 - 154 U/L   MAGNESIUM    Collection Time: 10/30/24 10:57 AM   Result Value Ref Range    Magnesium 1.7 1.5 - 2.5 mg/dL   PHOSPHORUS    Collection Time: 10/30/24 10:57 AM   Result Value Ref Range    Phosphorus  2.9 2.5 - 4.5 mg/dL   TROPONIN    Collection Time: 10/30/24 10:57 AM   Result Value Ref Range    Troponin T 25 (H) 6 - 19 ng/L   ESTIMATED GFR    Collection Time: 10/30/24 10:57 AM   Result Value Ref Range    GFR (CKD-EPI) 113 >60 mL/min/1.73 m 2   proBrain Natriuretic Peptide, NT    Collection Time: 10/30/24 10:57 AM   Result Value Ref Range    NT-proBNP <36 0 - 125 pg/mL   PROCALCITONIN    Collection Time: 10/30/24 10:57 AM   Result Value Ref Range    Procalcitonin 0.08 <0.25 ng/mL   POCT arterial blood gas device results    Collection Time: 10/30/24 11:57 AM   Result Value Ref Range    Ph 7.209 (LL) 7.400 - 7.500    Pco2 49.3 (H) 26.0 - 37.0 mmHg    Po2 115 (H) 64 - 87 mmHg    Tco2 21 20 - 33 mmol/L    S02 97 93 - 99 %    Hco3 19.6 17.0 - 25.0 mmol/L    BE -8 (L) -4 - 3 mmol/L    Body Temp 27.5 C degrees    O2 Therapy 100 %    iPF Ratio 115     Ph Temp Stephanie 7.337 (L) 7.400 - 7.500    Pco2 Temp Co 32.5 26.0 - 37.0 mmHg    Po2 Temp Cor 69 64 - 87 mmHg    Specimen Arterial     Wilberto Test Pass     DelSys Vent     Tidal Volume 400 mL    Peep End Expiratory Pressure 8 cmh20    Set Rate 24     Mode APV-CMV    PLATELET MAPPING WITH BASIC TEG    Collection Time: 10/30/24 12:09 PM   Result Value Ref Range    Reaction Time Initial-R 5.7 4.6 - 9.1 min    React Time Initial Hep 5.7 4.3 - 8.3 min    Clot Kinetics-K 0.9 0.8 - 2.1 min    Clot Angle-Angle 76.7 63.0 - 78.0 degrees    Maximum Clot Strength-MA 65.4 52.0 - 69.0 mm    TEG Functional Fibrinogen(MA) 25.7 15.0 - 32.0 mm    % Inhibition ADP 71.5 (H) 0.0 - 17.0 %    % Inhibition AA 70.2 (H) 0.0 - 11.0 %    TEG Algorithm Link Algorithm    ABO Rh Confirm    Collection Time: 10/30/24 12:09 PM   Result Value Ref Range    ABO Rh Confirm A POS    BLOOD CULTURE    Collection Time: 10/30/24 12:09 PM    Specimen: Peripheral; Blood   Result Value Ref Range    Significant Indicator NEG     Source BLD     Site Peripheral     Culture Result       No Growth  Note: Blood cultures are  incubated for 5 days and  are monitored continuously.Positive blood cultures  are called to the RN and reported as soon as  they are identified.     BLOOD CULTURE    Collection Time: 10/30/24 12:10 PM    Specimen: Peripheral; Blood   Result Value Ref Range    Significant Indicator NEG     Source BLD     Site Peripheral     Culture Result       No Growth  Note: Blood cultures are incubated for 5 days and  are monitored continuously.Positive blood cultures  are called to the RN and reported as soon as  they are identified.     ACETAMINOPHEN    Collection Time: 10/30/24 12:10 PM   Result Value Ref Range    Acetaminophen -Tylenol <5.0 (L) 10.0 - 30.0 ug/mL   Salicylate    Collection Time: 10/30/24 12:10 PM   Result Value Ref Range    Salicylates, Quant. <1.0 (L) 15.0 - 25.0 mg/dL   URINE DRUG SCREEN    Collection Time: 10/30/24 12:11 PM   Result Value Ref Range    Amphetamines Urine Negative Negative    Barbiturates Positive (A) Negative    Benzodiazepines Positive (A) Negative    Cocaine Metabolite Negative Negative    Fentanyl, Urine Negative Negative    Methadone Negative Negative    Opiates Negative Negative    Oxycodone Negative Negative    Phencyclidine -Pcp Negative Negative    Propoxyphene Negative Negative    Cannabinoid Metab Negative Negative   LACTIC ACID    Collection Time: 10/30/24  1:10 PM   Result Value Ref Range    Lactic Acid 9.0 (HH) 0.5 - 2.0 mmol/L   POC CoV-2, FLU A/B, RSV by PCR    Collection Time: 10/30/24  1:23 PM   Result Value Ref Range    POC Influenza A RNA, PCR Negative Negative    POC Influenza B RNA, PCR Negative Negative    POC RSV, by PCR Negative Negative    POC SARS-CoV-2, PCR NotDetected NotDetected   Repeat Lactic Acid    Collection Time: 10/30/24  3:00 PM   Result Value Ref Range    Lactic Acid 8.5 (HH) 0.5 - 2.0 mmol/L   Prothrombin time (INR)    Collection Time: 10/30/24  3:00 PM   Result Value Ref Range    PT 13.6 12.0 - 14.6 sec    INR 1.04 0.87 - 1.13   Cortisol    Collection  Time: 10/30/24  3:00 PM   Result Value Ref Range    Cortisol 13.8 0.0 - 23.0 ug/dL   Procalcitonin    Collection Time: 10/30/24  3:00 PM   Result Value Ref Range    Procalcitonin 0.07 <0.25 ng/mL   AMMONIA    Collection Time: 10/30/24  3:00 PM   Result Value Ref Range    Ammonia 18 11 - 45 umol/L   HEMOGLOBIN A1C    Collection Time: 10/30/24  3:00 PM   Result Value Ref Range    Glycohemoglobin 4.8 4.0 - 5.6 %    Est Avg Glucose 91 mg/dL   POCT glucose device results    Collection Time: 10/30/24  3:12 PM   Result Value Ref Range    POC Glucose, Blood 94 65 - 99 mg/dL   EC-ECHOCARDIOGRAM COMPLETE W/ CONT    Collection Time: 10/30/24  4:27 PM   Result Value Ref Range    Eject.Frac. MOD BP 66.74     Eject.Frac. MOD 4C 72.22     Eject.Frac. MOD 2C 59.63    EKG    Collection Time: 10/30/24  4:50 PM   Result Value Ref Range    Report       [facility]    Test Date:  2024-10-30  Pt Name:    PEREZ MEDEL                 Department: ER  MRN:        [id number]                      Room:       [facility]  Gender:     Male                         Technician: FGJ  :        [id number]                   Requested By:JAQUELIN PAUL  Order #:    [id number]                    Reading MD: Fred Gatica MD    Measurements  Intervals                                Axis  Rate:       53                           P:          25  PA:         177                          QRS:        31  QRSD:       127                          T:          33  QT:         599  QTc:        563    Interpretive Statements  Sinus bradycardia  Nonspecific intraventricular conduction delay  Borderline ST elevation, lateral leads  Compared to ECG 2023 01:10:09  Intraventricular conduction delay now present  ST (T wave) deviation now present  Sinus rhythm no longer present  Early repolarization no longer present  Possible ischemia no longer present  Elect ronically Signed On 10- 22:22:18 PDT by Fred Gatica MD     POCT arterial blood gas device  results    Collection Time: 10/30/24  5:04 PM   Result Value Ref Range    Ph 7.351 (L) 7.400 - 7.500    Pco2 27.2 26.0 - 37.0 mmHg    Po2 69 64 - 87 mmHg    Tco2 16 (L) 20 - 33 mmol/L    S02 93 93 - 99 %    Hco3 15.1 (L) 17.0 - 25.0 mmol/L    BE -9 (L) -4 - 3 mmol/L    Body Temp 31.4 C degrees    O2 Therapy 70 %    iPF Ratio 99     Ph Temp Stephanie 7.432 7.400 - 7.500    Pco2 Temp Co 21.3 (L) 26.0 - 37.0 mmHg    Po2 Temp Cor 48 (LL) 64 - 87 mmHg    Specimen Arterial     DelSys Vent     End Tidal Carbon Dioxide 18 mmhg    Tidal Volume 420 mL    Peep End Expiratory Pressure 10 cmh20    Set Rate 26     Mode APV-CMV    Basic Metabolic Panel    Collection Time: 10/30/24  5:40 PM   Result Value Ref Range    Sodium 144 135 - 145 mmol/L    Potassium 3.6 3.6 - 5.5 mmol/L    Chloride 103 96 - 112 mmol/L    Co2 16 (L) 20 - 33 mmol/L    Glucose 82 65 - 99 mg/dL    Bun 8 8 - 22 mg/dL    Creatinine 0.65 0.50 - 1.40 mg/dL    Calcium 7.6 (L) 8.5 - 10.5 mg/dL    Anion Gap 25.0 (H) 7.0 - 16.0   IONIZED CALCIUM    Collection Time: 10/30/24  5:40 PM   Result Value Ref Range    Ionized Calcium 1.0 (L) 1.1 - 1.3 mmol/L   MAGNESIUM    Collection Time: 10/30/24  5:40 PM   Result Value Ref Range    Magnesium 2.1 1.5 - 2.5 mg/dL   PHOSPHORUS    Collection Time: 10/30/24  5:40 PM   Result Value Ref Range    Phosphorus 3.1 2.5 - 4.5 mg/dL   ESTIMATED GFR    Collection Time: 10/30/24  5:40 PM   Result Value Ref Range    GFR (CKD-EPI) 115 >60 mL/min/1.73 m 2   POCT glucose device results    Collection Time: 10/30/24  5:43 PM   Result Value Ref Range    POC Glucose, Blood 82 65 - 99 mg/dL   Repeat Lactic Acid    Collection Time: 10/30/24  8:27 PM   Result Value Ref Range    Lactic Acid 5.3 (HH) 0.5 - 2.0 mmol/L   POCT glucose device results    Collection Time: 10/30/24  8:27 PM   Result Value Ref Range    POC Glucose, Blood 138 (H) 65 - 99 mg/dL   Repeat Lactic Acid    Collection Time: 10/30/24 11:43 PM   Result Value Ref Range    Lactic Acid 3.5 (H)  0.5 - 2.0 mmol/L   Basic Metabolic Panel    Collection Time: 10/30/24 11:43 PM   Result Value Ref Range    Sodium 146 (H) 135 - 145 mmol/L    Potassium 3.9 3.6 - 5.5 mmol/L    Chloride 105 96 - 112 mmol/L    Co2 21 20 - 33 mmol/L    Glucose 89 65 - 99 mg/dL    Bun 9 8 - 22 mg/dL    Creatinine 0.67 0.50 - 1.40 mg/dL    Calcium 7.9 (L) 8.5 - 10.5 mg/dL    Anion Gap 20.0 (H) 7.0 - 16.0   IONIZED CALCIUM    Collection Time: 10/30/24 11:43 PM   Result Value Ref Range    Ionized Calcium 1.0 (L) 1.1 - 1.3 mmol/L   MAGNESIUM    Collection Time: 10/30/24 11:43 PM   Result Value Ref Range    Magnesium 2.0 1.5 - 2.5 mg/dL   PHOSPHORUS    Collection Time: 10/30/24 11:43 PM   Result Value Ref Range    Phosphorus 3.5 2.5 - 4.5 mg/dL   ESTIMATED GFR    Collection Time: 10/30/24 11:43 PM   Result Value Ref Range    GFR (CKD-EPI) 114 >60 mL/min/1.73 m 2   POCT glucose device results    Collection Time: 10/30/24 11:43 PM   Result Value Ref Range    POC Glucose, Blood 95 65 - 99 mg/dL   POCT arterial blood gas device results    Collection Time: 10/31/24  3:11 AM   Result Value Ref Range    Ph 7.360 (L) 7.400 - 7.500    Pco2 43.1 (H) 26.0 - 37.0 mmHg    Po2 160 (H) 64 - 87 mmHg    Tco2 26 20 - 33 mmol/L    S02 99 93 - 99 %    Hco3 24.3 17.0 - 25.0 mmol/L    BE -1 -4 - 3 mmol/L    Body Temp 37.4 C degrees    O2 Therapy 70 %    iPF Ratio 229     Ph Temp Stephanie 7.354 (L) 7.400 - 7.500    Pco2 Temp Co 43.8 (H) 26.0 - 37.0 mmHg    Po2 Temp Cor 163 (H) 64 - 87 mmHg    Specimen Arterial     Wilberto Test Pass     DelSys Vent     End Tidal Carbon Dioxide 39 mmhg    Tidal Volume 420 mL    Peep End Expiratory Pressure 10 cmh20    Set Rate 22     Mode APV-CMV    POCT glucose device results    Collection Time: 10/31/24  5:14 AM   Result Value Ref Range    POC Glucose, Blood 60 (L) 65 - 99 mg/dL   CBC with Differential    Collection Time: 10/31/24  5:15 AM   Result Value Ref Range    WBC 4.7 (L) 4.8 - 10.8 K/uL    RBC 3.14 (L) 4.70 - 6.10 M/uL     Hemoglobin 10.7 (L) 14.0 - 18.0 g/dL    Hematocrit 33.2 (L) 42.0 - 52.0 %    .7 (H) 81.4 - 97.8 fL    MCH 34.1 (H) 27.0 - 33.0 pg    MCHC 32.2 (L) 32.3 - 36.5 g/dL    RDW 60.0 (H) 35.9 - 50.0 fL    Platelet Count 168 164 - 446 K/uL    MPV 10.1 9.0 - 12.9 fL    Neutrophils-Polys 91.20 (H) 44.00 - 72.00 %    Lymphocytes 5.10 (L) 22.00 - 41.00 %    Monocytes 2.70 0.00 - 13.40 %    Eosinophils 0.40 0.00 - 6.90 %    Basophils 0.40 0.00 - 1.80 %    Immature Granulocytes 0.20 0.00 - 0.90 %    Nucleated RBC 2.10 (H) 0.00 - 0.20 /100 WBC    Neutrophils (Absolute) 4.32 1.82 - 7.42 K/uL    Lymphs (Absolute) 0.24 (L) 1.00 - 4.80 K/uL    Monos (Absolute) 0.13 0.00 - 0.85 K/uL    Eos (Absolute) 0.02 0.00 - 0.51 K/uL    Baso (Absolute) 0.02 0.00 - 0.12 K/uL    Immature Granulocytes (abs) 0.01 0.00 - 0.11 K/uL    NRBC (Absolute) 0.10 K/uL   Magnesium    Collection Time: 10/31/24  5:15 AM   Result Value Ref Range    Magnesium 2.0 1.5 - 2.5 mg/dL   Phosphorus    Collection Time: 10/31/24  5:15 AM   Result Value Ref Range    Phosphorus 3.1 2.5 - 4.5 mg/dL   Comp Metabolic Panel    Collection Time: 10/31/24  5:15 AM   Result Value Ref Range    Sodium 147 (H) 135 - 145 mmol/L    Potassium 3.9 3.6 - 5.5 mmol/L    Chloride 107 96 - 112 mmol/L    Co2 22 20 - 33 mmol/L    Anion Gap 18.0 (H) 7.0 - 16.0    Glucose 62 (L) 65 - 99 mg/dL    Bun 9 8 - 22 mg/dL    Creatinine 1.02 0.50 - 1.40 mg/dL    Calcium 7.9 (L) 8.5 - 10.5 mg/dL    Correct Calcium 8.9 8.5 - 10.5 mg/dL    AST(SGOT) 135 (H) 12 - 45 U/L    ALT(SGPT) 64 (H) 2 - 50 U/L    Alkaline Phosphatase 158 (H) 30 - 99 U/L    Total Bilirubin 0.8 0.1 - 1.5 mg/dL    Albumin 2.8 (L) 3.2 - 4.9 g/dL    Total Protein 4.8 (L) 6.0 - 8.2 g/dL    Globulin 2.0 1.9 - 3.5 g/dL    A-G Ratio 1.4 g/dL   IONIZED CALCIUM    Collection Time: 10/31/24  5:15 AM   Result Value Ref Range    Ionized Calcium 1.0 (L) 1.1 - 1.3 mmol/L   CREATINE KINASE    Collection Time: 10/31/24  5:15 AM   Result Value Ref  Range    CPK Total 256 (H) 0 - 154 U/L   LACTIC ACID    Collection Time: 10/31/24  5:15 AM   Result Value Ref Range    Lactic Acid 2.1 (H) 0.5 - 2.0 mmol/L   ESTIMATED GFR    Collection Time: 10/31/24  5:15 AM   Result Value Ref Range    GFR (CKD-EPI) 89 >60 mL/min/1.73 m 2   POCT glucose device results    Collection Time: 10/31/24  5:37 AM   Result Value Ref Range    POC Glucose, Blood 114 (H) 65 - 99 mg/dL   POCT glucose device results    Collection Time: 10/31/24  6:27 AM   Result Value Ref Range    POC Glucose, Blood 72 65 - 99 mg/dL       Fluids    Intake/Output Summary (Last 24 hours) at 10/31/2024 0716  Last data filed at 10/31/2024 0600  Gross per 24 hour   Intake 3037.78 ml   Output 1945 ml   Net 1092.78 ml       Core Measures & Quality Metrics    Meredith catheter: Critically Ill - Requiring Accurate Measurement of Urinary Output                  RAP Score Total: 2     Blood Alcohol>0.08: yes Intervention: Complete. Patient response to intervention: admits to previous ETOH withdrawal seizures..   Patient does not demonstrate understanding of intervention. Patient does not agree to follow-up.   has not been contacted.       Assessment/Plan  No new Assessment & Plan notes have been filed under this hospital service since the last note was generated.  Service: Surgery General      Discussed patient condition with Patient.

## 2024-10-31 NOTE — CARE PLAN
The patient is Watcher - Medium risk of patient condition declining or worsening    Shift Goals  Clinical Goals: temp management, monitor lab values  Patient Goals: elise  Family Goals: updates    Progress made toward(s) clinical / shift goals:    Problem: Pain - Standard  Goal: Alleviation of pain or a reduction in pain to the patient’s comfort goal  Outcome: Progressing     Problem: Safety - Medical Restraint  Goal: Remains free of injury from restraints (Restraint for Interference with Medical Device)  Outcome: Progressing       Patient is not progressing towards the following goals:

## 2024-10-31 NOTE — ASSESSMENT & PLAN NOTE
Insetting of acute hypothermia.  EKG was nonischemic  Echo was negative for wall motion abnormalities

## 2024-10-31 NOTE — ASSESSMENT & PLAN NOTE
Patient is starting to exhibit signs of withdrawal  Start scheduled Librium for seizure prophylaxis  Start benzodiazepine withdrawal program  Seizure precautions  Close monitoring of mental status and airway

## 2024-10-31 NOTE — CARE PLAN
The patient is Stable - Low risk of patient condition declining or worsening    Shift Goals  Clinical Goals: extubate, MAUROWA  Patient Goals: elise  Family Goals: updates    Progress made toward(s) clinical / shift goals:    Problem: Pain - Standard  Goal: Alleviation of pain or a reduction in pain to the patient’s comfort goal  Description: Target End Date:  Prior to discharge or change in level of care    Document on Vitals flowsheet    1.  Document pain using the appropriate pain scale per order or unit policy  2.  Educate and implement non-pharmacologic comfort measures (i.e. relaxation, distraction, massage, cold/heat therapy, etc.)  3.  Pain management medications as ordered  4.  Reassess pain after pain med administration per policy  5.  If opiods administered assess patient's response to pain medication is appropriate per POSS sedation scale  6.  Follow pain management plan developed in collaboration with patient and interdisciplinary team (including palliative care or pain specialists if applicable)  Outcome: Progressing

## 2024-10-31 NOTE — ASSESSMENT & PLAN NOTE
As outpatient was maintained on losartan 100 mg daily, Coreg 6.25 mg twice daily, and amlodipine 10 mg daily  Currently going through withdrawals so titration of his chronic outpatient medications is best left to a later time when he is no longer in DTs

## 2024-11-01 ENCOUNTER — APPOINTMENT (OUTPATIENT)
Dept: RADIOLOGY | Facility: MEDICAL CENTER | Age: 49
DRG: 871 | End: 2024-11-01
Attending: INTERNAL MEDICINE
Payer: COMMERCIAL

## 2024-11-01 LAB
ALBUMIN SERPL BCP-MCNC: 3.1 G/DL (ref 3.2–4.9)
ALBUMIN/GLOB SERPL: 1.3 G/DL
ALP SERPL-CCNC: 197 U/L (ref 30–99)
ALT SERPL-CCNC: 56 U/L (ref 2–50)
ANION GAP SERPL CALC-SCNC: 14 MMOL/L (ref 7–16)
ANION GAP SERPL CALC-SCNC: 8 MMOL/L (ref 7–16)
AST SERPL-CCNC: 148 U/L (ref 12–45)
BACTERIA UR CULT: NORMAL
BASOPHILS # BLD AUTO: 0.5 % (ref 0–1.8)
BASOPHILS # BLD: 0.03 K/UL (ref 0–0.12)
BILIRUB SERPL-MCNC: 0.9 MG/DL (ref 0.1–1.5)
BUN SERPL-MCNC: 10 MG/DL (ref 8–22)
BUN SERPL-MCNC: 11 MG/DL (ref 8–22)
CA-I SERPL-SCNC: 1 MMOL/L (ref 1.1–1.3)
CALCIUM ALBUM COR SERPL-MCNC: 9 MG/DL (ref 8.5–10.5)
CALCIUM SERPL-MCNC: 8.3 MG/DL (ref 8.5–10.5)
CALCIUM SERPL-MCNC: 8.9 MG/DL (ref 8.5–10.5)
CHLORIDE SERPL-SCNC: 100 MMOL/L (ref 96–112)
CHLORIDE SERPL-SCNC: 99 MMOL/L (ref 96–112)
CK SERPL-CCNC: 428 U/L (ref 0–154)
CO2 SERPL-SCNC: 26 MMOL/L (ref 20–33)
CO2 SERPL-SCNC: 28 MMOL/L (ref 20–33)
CREAT SERPL-MCNC: 0.71 MG/DL (ref 0.5–1.4)
CREAT SERPL-MCNC: 0.84 MG/DL (ref 0.5–1.4)
EOSINOPHIL # BLD AUTO: 0.01 K/UL (ref 0–0.51)
EOSINOPHIL NFR BLD: 0.2 % (ref 0–6.9)
ERYTHROCYTE [DISTWIDTH] IN BLOOD BY AUTOMATED COUNT: 59.3 FL (ref 35.9–50)
GFR SERPLBLD CREATININE-BSD FMLA CKD-EPI: 106 ML/MIN/1.73 M 2
GFR SERPLBLD CREATININE-BSD FMLA CKD-EPI: 112 ML/MIN/1.73 M 2
GLOBULIN SER CALC-MCNC: 2.3 G/DL (ref 1.9–3.5)
GLUCOSE BLD STRIP.AUTO-MCNC: 67 MG/DL (ref 65–99)
GLUCOSE BLD STRIP.AUTO-MCNC: 68 MG/DL (ref 65–99)
GLUCOSE SERPL-MCNC: 78 MG/DL (ref 65–99)
GLUCOSE SERPL-MCNC: 80 MG/DL (ref 65–99)
HCT VFR BLD AUTO: 33.3 % (ref 42–52)
HGB BLD-MCNC: 10.9 G/DL (ref 14–18)
IMM GRANULOCYTES # BLD AUTO: 0.02 K/UL (ref 0–0.11)
IMM GRANULOCYTES NFR BLD AUTO: 0.3 % (ref 0–0.9)
LYMPHOCYTES # BLD AUTO: 0.9 K/UL (ref 1–4.8)
LYMPHOCYTES NFR BLD: 15 % (ref 22–41)
MAGNESIUM SERPL-MCNC: 1.7 MG/DL (ref 1.5–2.5)
MCH RBC QN AUTO: 34 PG (ref 27–33)
MCHC RBC AUTO-ENTMCNC: 32.7 G/DL (ref 32.3–36.5)
MCV RBC AUTO: 103.7 FL (ref 81.4–97.8)
MONOCYTES # BLD AUTO: 0.3 K/UL (ref 0–0.85)
MONOCYTES NFR BLD AUTO: 5 % (ref 0–13.4)
NEUTROPHILS # BLD AUTO: 4.73 K/UL (ref 1.82–7.42)
NEUTROPHILS NFR BLD: 79 % (ref 44–72)
NRBC # BLD AUTO: 0.05 K/UL
NRBC BLD-RTO: 0.8 /100 WBC (ref 0–0.2)
PHOSPHATE SERPL-MCNC: 3.2 MG/DL (ref 2.5–4.5)
PLATELET # BLD AUTO: 84 K/UL (ref 164–446)
PLATELETS.RETICULATED NFR BLD AUTO: 6.9 % (ref 0.6–13.1)
PMV BLD AUTO: 11.3 FL (ref 9–12.9)
POTASSIUM SERPL-SCNC: 3.4 MMOL/L (ref 3.6–5.5)
POTASSIUM SERPL-SCNC: 3.7 MMOL/L (ref 3.6–5.5)
PROT SERPL-MCNC: 5.4 G/DL (ref 6–8.2)
RBC # BLD AUTO: 3.21 M/UL (ref 4.7–6.1)
SIGNIFICANT IND 70042: NORMAL
SITE SITE: NORMAL
SODIUM SERPL-SCNC: 136 MMOL/L (ref 135–145)
SODIUM SERPL-SCNC: 139 MMOL/L (ref 135–145)
SOURCE SOURCE: NORMAL
WBC # BLD AUTO: 6 K/UL (ref 4.8–10.8)

## 2024-11-01 PROCEDURE — 700102 HCHG RX REV CODE 250 W/ 637 OVERRIDE(OP): Performed by: STUDENT IN AN ORGANIZED HEALTH CARE EDUCATION/TRAINING PROGRAM

## 2024-11-01 PROCEDURE — 700111 HCHG RX REV CODE 636 W/ 250 OVERRIDE (IP): Performed by: HOSPITALIST

## 2024-11-01 PROCEDURE — 80048 BASIC METABOLIC PNL TOTAL CA: CPT

## 2024-11-01 PROCEDURE — A9270 NON-COVERED ITEM OR SERVICE: HCPCS | Performed by: STUDENT IN AN ORGANIZED HEALTH CARE EDUCATION/TRAINING PROGRAM

## 2024-11-01 PROCEDURE — 99233 SBSQ HOSP IP/OBS HIGH 50: CPT | Performed by: HOSPITALIST

## 2024-11-01 PROCEDURE — 85025 COMPLETE CBC W/AUTO DIFF WBC: CPT

## 2024-11-01 PROCEDURE — 700111 HCHG RX REV CODE 636 W/ 250 OVERRIDE (IP): Mod: JG | Performed by: HOSPITALIST

## 2024-11-01 PROCEDURE — 87493 C DIFF AMPLIFIED PROBE: CPT

## 2024-11-01 PROCEDURE — 700102 HCHG RX REV CODE 250 W/ 637 OVERRIDE(OP): Performed by: HOSPITALIST

## 2024-11-01 PROCEDURE — 84100 ASSAY OF PHOSPHORUS: CPT

## 2024-11-01 PROCEDURE — 82550 ASSAY OF CK (CPK): CPT

## 2024-11-01 PROCEDURE — 82330 ASSAY OF CALCIUM: CPT

## 2024-11-01 PROCEDURE — 83735 ASSAY OF MAGNESIUM: CPT

## 2024-11-01 PROCEDURE — 82962 GLUCOSE BLOOD TEST: CPT | Mod: 91

## 2024-11-01 PROCEDURE — 80053 COMPREHEN METABOLIC PANEL: CPT

## 2024-11-01 PROCEDURE — 770020 HCHG ROOM/CARE - TELE (206)

## 2024-11-01 PROCEDURE — 71045 X-RAY EXAM CHEST 1 VIEW: CPT

## 2024-11-01 PROCEDURE — A9270 NON-COVERED ITEM OR SERVICE: HCPCS | Performed by: HOSPITALIST

## 2024-11-01 PROCEDURE — 700111 HCHG RX REV CODE 636 W/ 250 OVERRIDE (IP): Performed by: INTERNAL MEDICINE

## 2024-11-01 PROCEDURE — 87324 CLOSTRIDIUM AG IA: CPT

## 2024-11-01 PROCEDURE — 700105 HCHG RX REV CODE 258: Performed by: INTERNAL MEDICINE

## 2024-11-01 PROCEDURE — 36415 COLL VENOUS BLD VENIPUNCTURE: CPT

## 2024-11-01 PROCEDURE — 85055 RETICULATED PLATELET ASSAY: CPT

## 2024-11-01 RX ORDER — AMLODIPINE BESYLATE 10 MG/1
10 TABLET ORAL DAILY
Status: DISCONTINUED | OUTPATIENT
Start: 2024-11-01 | End: 2024-11-07 | Stop reason: HOSPADM

## 2024-11-01 RX ORDER — MAGNESIUM SULFATE HEPTAHYDRATE 40 MG/ML
2 INJECTION, SOLUTION INTRAVENOUS ONCE
Status: COMPLETED | OUTPATIENT
Start: 2024-11-01 | End: 2024-11-01

## 2024-11-01 RX ORDER — CALCIUM GLUCONATE 20 MG/ML
1 INJECTION, SOLUTION INTRAVENOUS ONCE
Status: COMPLETED | OUTPATIENT
Start: 2024-11-01 | End: 2024-11-01

## 2024-11-01 RX ORDER — GABAPENTIN 100 MG/1
100 CAPSULE ORAL 3 TIMES DAILY PRN
Status: DISCONTINUED | OUTPATIENT
Start: 2024-11-01 | End: 2024-11-07 | Stop reason: HOSPADM

## 2024-11-01 RX ORDER — HYDRALAZINE HYDROCHLORIDE 20 MG/ML
10 INJECTION INTRAMUSCULAR; INTRAVENOUS EVERY 6 HOURS PRN
Status: DISCONTINUED | OUTPATIENT
Start: 2024-11-01 | End: 2024-11-07 | Stop reason: HOSPADM

## 2024-11-01 RX ORDER — POTASSIUM CHLORIDE 1500 MG/1
40 TABLET, EXTENDED RELEASE ORAL ONCE
Status: COMPLETED | OUTPATIENT
Start: 2024-11-01 | End: 2024-11-01

## 2024-11-01 RX ORDER — CARVEDILOL 6.25 MG/1
6.25 TABLET ORAL 2 TIMES DAILY WITH MEALS
Status: DISCONTINUED | OUTPATIENT
Start: 2024-11-01 | End: 2024-11-07 | Stop reason: HOSPADM

## 2024-11-01 RX ADMIN — LORAZEPAM 1 MG: 1 TABLET ORAL at 18:40

## 2024-11-01 RX ADMIN — AZITHROMYCIN DIHYDRATE 500 MG: 250 TABLET, FILM COATED ORAL at 05:31

## 2024-11-01 RX ADMIN — CHLORDIAZEPOXIDE HYDROCHLORIDE 25 MG: 25 CAPSULE ORAL at 11:56

## 2024-11-01 RX ADMIN — LORAZEPAM 0.5 MG: 2 INJECTION INTRAMUSCULAR; INTRAVENOUS at 02:58

## 2024-11-01 RX ADMIN — LORAZEPAM 0.5 MG: 1 TABLET ORAL at 14:46

## 2024-11-01 RX ADMIN — LORAZEPAM 2 MG: 2 TABLET ORAL at 05:32

## 2024-11-01 RX ADMIN — CHLORDIAZEPOXIDE HYDROCHLORIDE 25 MG: 25 CAPSULE ORAL at 17:11

## 2024-11-01 RX ADMIN — FOLIC ACID 1 MG: 1 TABLET ORAL at 05:31

## 2024-11-01 RX ADMIN — CEFTRIAXONE SODIUM 2000 MG: 10 INJECTION, POWDER, FOR SOLUTION INTRAVENOUS at 06:07

## 2024-11-01 RX ADMIN — AMLODIPINE BESYLATE 10 MG: 10 TABLET ORAL at 11:56

## 2024-11-01 RX ADMIN — ENOXAPARIN SODIUM 40 MG: 100 INJECTION SUBCUTANEOUS at 17:11

## 2024-11-01 RX ADMIN — LORAZEPAM 1 MG: 1 TABLET ORAL at 08:14

## 2024-11-01 RX ADMIN — CALCIUM GLUCONATE 1 G: 20 INJECTION, SOLUTION INTRAVENOUS at 09:38

## 2024-11-01 RX ADMIN — POTASSIUM CHLORIDE 40 MEQ: 1500 TABLET, EXTENDED RELEASE ORAL at 17:11

## 2024-11-01 RX ADMIN — CHLORDIAZEPOXIDE HYDROCHLORIDE 50 MG: 25 CAPSULE ORAL at 05:32

## 2024-11-01 RX ADMIN — MAGNESIUM SULFATE HEPTAHYDRATE 2 G: 2 INJECTION, SOLUTION INTRAVENOUS at 08:37

## 2024-11-01 RX ADMIN — LORAZEPAM 1 MG: 1 TABLET ORAL at 10:53

## 2024-11-01 RX ADMIN — CLONIDINE HYDROCHLORIDE 0.1 MG: 0.1 TABLET ORAL at 19:25

## 2024-11-01 RX ADMIN — THERA TABS 1 TABLET: TAB at 05:32

## 2024-11-01 RX ADMIN — CARVEDILOL 6.25 MG: 6.25 TABLET, FILM COATED ORAL at 17:11

## 2024-11-01 RX ADMIN — CLONIDINE HYDROCHLORIDE 0.1 MG: 0.1 TABLET ORAL at 03:54

## 2024-11-01 RX ADMIN — HYDRALAZINE HYDROCHLORIDE 10 MG: 20 INJECTION INTRAMUSCULAR; INTRAVENOUS at 07:52

## 2024-11-01 ASSESSMENT — LIFESTYLE VARIABLES
ANXIETY: *
AGITATION: SOMEWHAT MORE THAN NORMAL ACTIVITY
TREMOR: NO TREMOR
PAROXYSMAL SWEATS: *
NAUSEA AND VOMITING: NO NAUSEA AND NO VOMITING
VISUAL DISTURBANCES: VERY MILD SENSITIVITY
ORIENTATION AND CLOUDING OF SENSORIUM: DATE DISORIENTATION BY MORE THAN TWO CALENDAR DAYS
PAROXYSMAL SWEATS: BARELY PERCEPTIBLE SWEATING. PALMS MOIST
ORIENTATION AND CLOUDING OF SENSORIUM: CANNOT DO SERIAL ADDITIONS OR IS UNCERTAIN ABOUT DATE
TREMOR: *
HEADACHE, FULLNESS IN HEAD: NOT PRESENT
ORIENTATION AND CLOUDING OF SENSORIUM: CANNOT DO SERIAL ADDITIONS OR IS UNCERTAIN ABOUT DATE
TREMOR: TREMOR NOT VISIBLE BUT CAN BE FELT, FINGERTIP TO FINGERTIP
NAUSEA AND VOMITING: NO NAUSEA AND NO VOMITING
TOTAL SCORE: 9
ANXIETY: *
HEADACHE, FULLNESS IN HEAD: NOT PRESENT
NAUSEA AND VOMITING: MILD NAUSEA WITH NO VOMITING
NAUSEA AND VOMITING: NO NAUSEA AND NO VOMITING
AUDITORY DISTURBANCES: NOT PRESENT
ORIENTATION AND CLOUDING OF SENSORIUM: DATE DISORIENTATION BY NO MORE THAN TWO CALENDAR DAYS
AGITATION: NORMAL ACTIVITY
HEADACHE, FULLNESS IN HEAD: MILD
ORIENTATION AND CLOUDING OF SENSORIUM: CANNOT DO SERIAL ADDITIONS OR IS UNCERTAIN ABOUT DATE
VISUAL DISTURBANCES: NOT PRESENT
AUDITORY DISTURBANCES: NOT PRESENT
TOTAL SCORE: 13
TOTAL SCORE: VERY MILD ITCHING, PINS AND NEEDLES SENSATION, BURNING OR NUMBNESS
ANXIETY: MILDLY ANXIOUS
TREMOR: *
NAUSEA AND VOMITING: NO NAUSEA AND NO VOMITING
HEADACHE, FULLNESS IN HEAD: NOT PRESENT
AGITATION: SOMEWHAT MORE THAN NORMAL ACTIVITY
TOTAL SCORE: 8
ANXIETY: NO ANXIETY (AT EASE)
AGITATION: NORMAL ACTIVITY
VISUAL DISTURBANCES: VERY MILD SENSITIVITY
ANXIETY: MILDLY ANXIOUS
VISUAL DISTURBANCES: VERY MILD SENSITIVITY
AUDITORY DISTURBANCES: NOT PRESENT
AGITATION: NORMAL ACTIVITY
PAROXYSMAL SWEATS: NO SWEAT VISIBLE
VISUAL DISTURBANCES: VERY MILD SENSITIVITY
HEADACHE, FULLNESS IN HEAD: VERY MILD
AGITATION: NORMAL ACTIVITY
HEADACHE, FULLNESS IN HEAD: NOT PRESENT
PAROXYSMAL SWEATS: *
TOTAL SCORE: VERY MILD ITCHING, PINS AND NEEDLES SENSATION, BURNING OR NUMBNESS
ORIENTATION AND CLOUDING OF SENSORIUM: CANNOT DO SERIAL ADDITIONS OR IS UNCERTAIN ABOUT DATE
TREMOR: *
TREMOR: *
AUDITORY DISTURBANCES: VERY MILD HARSHNESS OR ABILITY TO FRIGHTEN
PAROXYSMAL SWEATS: BARELY PERCEPTIBLE SWEATING. PALMS MOIST
ORIENTATION AND CLOUDING OF SENSORIUM: CANNOT DO SERIAL ADDITIONS OR IS UNCERTAIN ABOUT DATE
VISUAL DISTURBANCES: NOT PRESENT
PAROXYSMAL SWEATS: BARELY PERCEPTIBLE SWEATING. PALMS MOIST
TOTAL SCORE: 4
NAUSEA AND VOMITING: NO NAUSEA AND NO VOMITING
TOTAL SCORE: 6
AUDITORY DISTURBANCES: NOT PRESENT
AGITATION: NORMAL ACTIVITY
NAUSEA AND VOMITING: NO NAUSEA AND NO VOMITING
PAROXYSMAL SWEATS: *
TOTAL SCORE: 8
TOTAL SCORE: 9
VISUAL DISTURBANCES: VERY MILD SENSITIVITY
ANXIETY: *
ANXIETY: MILDLY ANXIOUS
AUDITORY DISTURBANCES: NOT PRESENT
AUDITORY DISTURBANCES: VERY MILD HARSHNESS OR ABILITY TO FRIGHTEN
HEADACHE, FULLNESS IN HEAD: NOT PRESENT
TREMOR: *
TOTAL SCORE: VERY MILD ITCHING, PINS AND NEEDLES SENSATION, BURNING OR NUMBNESS

## 2024-11-01 ASSESSMENT — FIBROSIS 4 INDEX: FIB4 SCORE: 11.77

## 2024-11-01 ASSESSMENT — PAIN DESCRIPTION - PAIN TYPE
TYPE: ACUTE PAIN
TYPE: ACUTE PAIN

## 2024-11-01 NOTE — PROGRESS NOTES
Bedside report received from off going RN/tech: Karen, assumed care of patient.     Fall Risk Score: HIGH RISK  Fall risk interventions in place: Place yellow fall risk ID band on patient, Provide patient/family education based on risk assessment, Educate patient/family to call staff for assistance when getting out of bed, Place fall precaution signage outside patient door, Place patient in room close to nursing station, Utilize bed/chair fall alarm, Notify charge of high risk for huddle, and Bed alarm connected correctly  Bed type: Regular (Isidro Score less than 17 interventions in place)  Patient on cardiac monitor: Yes  IVF/IV medications: Not Applicable   Oxygen: How many liters 6L and Traced the line to wall oxygen  Bedside sitter: Not Applicable   Isolation: Not applicable    Pt resting comfortably, bed in low and locked position, call light within reach, will continue to monitor.

## 2024-11-01 NOTE — CARE PLAN
The patient is Stable - Low risk of patient condition declining or worsening    Shift Goals  Clinical Goals: CIWA, maintain pt safety  Patient Goals: LISA  Family Goals: updates    Progress made toward(s) clinical / shift goals:      Problem: Knowledge Deficit - Standard  Goal: Patient and family/care givers will demonstrate understanding of plan of care, disease process/condition, diagnostic tests and medications  Description: Target End Date:  1-3 days or as soon as patient condition allows    Document in Patient Education    1.  Patient and family/caregiver oriented to unit, equipment, visitation policy and means for communicating concern  2.  Complete/review Learning Assessment  3.  Assess knowledge level of disease process/condition, treatment plan, diagnostic tests and medications  4.  Explain disease process/condition, treatment plan, diagnostic tests and medications  Outcome: Progressing     Problem: Pain - Standard  Goal: Alleviation of pain or a reduction in pain to the patient’s comfort goal  Description: Target End Date:  Prior to discharge or change in level of care    Document on Vitals flowsheet    1.  Document pain using the appropriate pain scale per order or unit policy  2.  Educate and implement non-pharmacologic comfort measures (i.e. relaxation, distraction, massage, cold/heat therapy, etc.)  3.  Pain management medications as ordered  4.  Reassess pain after pain med administration per policy  5.  If opiods administered assess patient's response to pain medication is appropriate per POSS sedation scale  6.  Follow pain management plan developed in collaboration with patient and interdisciplinary team (including palliative care or pain specialists if applicable)  Outcome: Progressing     Problem: Skin Integrity  Goal: Skin integrity is maintained or improved  Description: Target End Date:  Prior to discharge or change in level of care    Document interventions on Skin Risk/Isidro flowsheet groups  and corresponding LDA    1.  Assess and monitor skin integrity, appearance and/or temperature  2.  Assess risk factors for impaired skin integrity and/or pressures ulcers  3.  Implement precautions to protect skin integrity in collaboration with interdisciplinary team  4.  Implement pressure ulcer prevention protocol if at risk for skin breakdown  5.  Confirm wound care consult if at risk for skin breakdown  6.  Ensure patient use of pressure relieving devices  (Low air loss bed, waffle overlay, heel protectors, ROHO cushion, etc)  Outcome: Progressing     Problem: Fall Risk  Goal: Patient will remain free from falls  Description: Target End Date:  Prior to discharge or change in level of care    Document interventions on the [facility] Fall Risk Assessment    1.  Assess for fall risk factors  2.  Implement fall precautions  Outcome: Progressing     Problem: Safety - Medical Restraint  Goal: Remains free of injury from restraints (Restraint for Interference with Medical Device)  Description: INTERVENTIONS:  1. Determine that other, less restrictive measures have been tried or would not be effective before applying the restraint  2. Evaluate the patient's condition at the time of restraint application  3. Educate patient/family regarding the reason for restraint  4. Q2H: Monitor safety, psychosocial status, comfort, circulation, respiratory status, LOC, nutrition and hydration  Outcome: Progressing    Problem: Seizure Precautions  Goal: Implementation of seizure precautions  Description: Target End Date:  Prior to discharge or change in level of care    1.  Padded side rails up at all times  2.  Suction equipment and oxygen delivery system at bedside  3.  Continuous pulse oximeter in use  4.  Implement fall precautions, bed alarm on, bed in lowest position  5.  IV access (per order)  6.  Provide low stimulus environment, avoid exposure to triggers  7.  Instruct patient to use call light/seizure button if having  warning signs of impending seizure  Outcome: Progressing     Problem: Optimal Care for Alcohol Withdrawal  Goal: Optimal Care for the alcohol withdrawal patient  Description: Target End Date:  1 to 3 days    1.  Alcohol history screening done on admission  2.  CIWA-AR score assessment (includes assessment of nausea/vomiting, tremor, sweats, anxiety, agitation, tactile, visual and auditory disturbances, headache and orientation/sensorium).  Document on CIWA flowsheet.  3.  Follow CIWA-AR score protocol  4.  Frequent reorientation  5.  Monitor for fluid and electrolyte imbalance.  6.  Assess for respiratory depression due to sedation (pulse ox)  7.  Consider thiamine, multivitamins, folic acid and magnesium sulfate per provider order  8.  Collaborate with Social Workers/Case Management  9.  Collaborate with mental health  Outcome: Progressing

## 2024-11-01 NOTE — PROGRESS NOTES
Received call from patient's sister, Makayla Lewis. She stated patient's wife  in  and since then patient has been heavily drinking, approximately 1 1/2 large bottles of vodka per day. She also stated patient was released from [facility] about 2 weeks ago where he was detoxing from alcohol as well. Furthermore, Makayla stated patient is non-compliant with all medications, had a stroke in , and the eye injury of left eye preventing patient from opening was due to him falling and hitting a hearth in January/February of this year ().

## 2024-11-01 NOTE — CARE PLAN
The patient is Stable - Low risk of patient condition declining or worsening    Shift Goals  Clinical Goals: CIWA  Patient Goals: LISA  Family Goals: updates    Progress made toward(s) clinical / shift goals:    Problem: Pain - Standard  Goal: Alleviation of pain or a reduction in pain to the patient’s comfort goal  Description: Target End Date:  Prior to discharge or change in level of care    Document on Vitals flowsheet    1.  Document pain using the appropriate pain scale per order or unit policy  2.  Educate and implement non-pharmacologic comfort measures (i.e. relaxation, distraction, massage, cold/heat therapy, etc.)  3.  Pain management medications as ordered  4.  Reassess pain after pain med administration per policy  5.  If opiods administered assess patient's response to pain medication is appropriate per POSS sedation scale  6.  Follow pain management plan developed in collaboration with patient and interdisciplinary team (including palliative care or pain specialists if applicable)  Outcome: Progressing     Problem: Skin Integrity  Goal: Skin integrity is maintained or improved  Description: Target End Date:  Prior to discharge or change in level of care    Document interventions on Skin Risk/Isidro flowsheet groups and corresponding LDA    1.  Assess and monitor skin integrity, appearance and/or temperature  2.  Assess risk factors for impaired skin integrity and/or pressures ulcers  3.  Implement precautions to protect skin integrity in collaboration with interdisciplinary team  4.  Implement pressure ulcer prevention protocol if at risk for skin breakdown  5.  Confirm wound care consult if at risk for skin breakdown  6.  Ensure patient use of pressure relieving devices  (Low air loss bed, waffle overlay, heel protectors, ROHO cushion, etc)  Outcome: Progressing     Problem: Fall Risk  Goal: Patient will remain free from falls  Description: Target End Date:  Prior to discharge or change in level of  care    Document interventions on the [facility] Fall Risk Assessment    1.  Assess for fall risk factors  2.  Implement fall precautions  Outcome: Progressing       Patient is not progressing towards the following goals:

## 2024-11-01 NOTE — DISCHARGE PLANNING
Care Transition Team Discharge Planning    Anticipated Discharge Information  Discharge Disposition: D/T to SNF with Medicare cert in anticipation of skilled care (03)    Discharge Plan:  Patient discussed in IDT rounds. Requested PT/OT orders (6 clicks 8/6). Anticipate SNF

## 2024-11-01 NOTE — PROGRESS NOTES
"Hospital Medicine Daily Progress Note    Date of Service  11/1/2024    Chief Complaint  Jethro Cook is a 49 y.o. male admitted 10/30/2024 with alcohol withdrawal, encephalopathy    Hospital Course  49 y.o. male who presented 10/30/2024 with a history of ETOH abuse, HLD, tobacco use, CAD.  Found down by family last seen 24hrs previously.  Intubated by EMS.  BAL >400, , LA 7.9, , core temp 27.5c.  extubated 10/29     On my examination the patient states he feels fine.  He does not recall what happened prior to his presentation here, just remembers waking up here in the hospital.  He does admit to drinking \"a lot\" but is reluctant to specify.  He thinks that he was feeling okay prior to being found down, he does not recall feeling of fever or headache or any other symptoms.  At present he does feel \"a little jittery\".    Interval Problem Update  11/1 patient is resting in bed, patient is new to me today, patient having diarrhea, have ordered to check for C. difficile infection, I have restarted blood pressure medications, also his gabapentin, continue CIWA protocol, monitoring electrolytes, patient is able to move his extremities, patient general very weak, denies any shortness of breath, continue close monitoring, continue telemetry, discussed with bedside nurse charge nurse  pharmacist, follow-up blood work in a.m.    I have discussed this patient's plan of care and discharge plan at IDT rounds today with Case Management, Nursing, Nursing leadership, and other members of the IDT team.    Consultants/Specialty  Critical care    Code Status  Full Code    Disposition  The patient is not medically cleared for discharge to home or a post-acute facility.      I have placed the appropriate orders for post-discharge needs.    Review of Systems  Review of Systems   Unable to perform ROS: Mental acuity        Physical Exam  Temp:  [36.6 °C (97.9 °F)-37.3 °C (99.1 °F)] 36.7 °C (98.1 °F)  Pulse:  " [103-126] 113  Resp:  [17-24] 17  BP: (148-185)/() 167/123  SpO2:  [93 %-96 %] 96 %    Physical Exam  Vitals and nursing note reviewed.   Constitutional:       Appearance: He is ill-appearing.   HENT:      Head: Normocephalic.   Eyes:      General:         Right eye: No discharge.         Left eye: No discharge.      Conjunctiva/sclera: Conjunctivae normal.   Cardiovascular:      Rate and Rhythm: Regular rhythm. Tachycardia present.      Pulses: Normal pulses.      Heart sounds: Normal heart sounds.   Pulmonary:      Effort: Pulmonary effort is normal.      Breath sounds: Normal breath sounds.   Abdominal:      General: Bowel sounds are normal. There is no distension.      Tenderness: There is no abdominal tenderness.   Musculoskeletal:         General: Normal range of motion.      Cervical back: Normal range of motion and neck supple.      Right lower leg: No edema.      Left lower leg: No edema.   Skin:     General: Skin is warm.      Capillary Refill: Capillary refill takes less than 2 seconds.   Neurological:      General: No focal deficit present.      Mental Status: He is alert.      Comments: Partially oriented, able to move all his extremities.   Psychiatric:         Mood and Affect: Mood normal.         Fluids    Intake/Output Summary (Last 24 hours) at 11/1/2024 1630  Last data filed at 11/1/2024 1203  Gross per 24 hour   Intake 240 ml   Output 4900 ml   Net -4660 ml        Laboratory  Recent Labs     10/30/24  1057 10/31/24  0515 11/01/24  0127   WBC 2.3* 4.7* 6.0   RBC 3.26* 3.14* 3.21*   HEMOGLOBIN 11.1* 10.7* 10.9*   HEMATOCRIT 35.0* 33.2* 33.3*   .4* 105.7* 103.7*   MCH 34.0* 34.1* 34.0*   MCHC 31.7* 32.2* 32.7   RDW 60.3* 60.0* 59.3*   PLATELETCT 173 168 84*   MPV 10.4 10.1 11.3     Recent Labs     10/31/24  1520 11/01/24  0127 11/01/24  1036   SODIUM 140 136 139   POTASSIUM 4.4 3.7 3.4*   CHLORIDE 103 100 99   CO2 26 28 26   GLUCOSE 93 78 80   BUN 12 11 10   CREATININE 0.99 0.84 0.71    CALCIUM 8.0* 8.3* 8.9     Recent Labs     10/30/24  1057 10/30/24  1500   APTT 33.8  --    INR 1.02 1.04               Imaging  DX-CHEST-PORTABLE (1 VIEW)   Final Result   Impression:      1. Shallow breath post extubation.      2. Mild infiltrates. No effusions.                     DX-CHEST-PORTABLE (1 VIEW)   Final Result      Bilateral infiltrates, left worse than right.      EC-ECHOCARDIOGRAM COMPLETE W/ CONT   Final Result      DX-CHEST-LIMITED (1 VIEW)   Final Result         1. Interval placement of a right internal jugular central venous access catheter terminating over the superior vena cava. No postprocedure visible pneumothorax.   2. Adjustment of the endotracheal and nasogastric tubes, now appropriately positioned.   3. Resolution of gastric distention with air.      CT-LSPINE W/O PLUS RECONS   Final Result      1. No acute osseous abnormality involving the lumbar spine.   2. L5-S1 degenerative disc disease and facet arthrosis.   3. For imaging findings regarding the remainder of the abdomen and pelvis, please see CT of the chest, abdomen and pelvis performed the same day.      CT-TSPINE W/O PLUS RECONS   Final Result      1. No acute posttraumatic imaging findings in the thoracic spine.   2. Appropriate positions of the endotracheal and nasogastric tubes.   3. For the imaging findings in the remainder of the chest and abdomen, please see CT chest, abdomen and pelvis performed the same day.      CT-CHEST,ABDOMEN,PELVIS WITH   Final Result      1.  BILATERAL lower lobe atelectasis. Superimposed pneumonia not excluded   2.  Edema in the retroperitoneum could indicate pancreatitis, gastritis or duodenitis   3.  Hepatic steatosis   4.  Trace ascites   5.  LEFT inguinal hernia contains fat   6.  Thyroid gland is enlarged and heterogenous, recommend correlation with laboratory studies and ultrasound when clinically appropriate      CT-CSPINE WITHOUT PLUS RECONS   Final Result      1.  No acute abnormality  identified.   2.  Multilevel multifactorial degenerative changes   3.  Heterogeneous enlarged thyroid, recommend correlation with laboratory studies and ultrasound when clinically appropriate      CT-MAXILLOFACIAL W/O PLUS RECONS   Final Result      1. No acute facial bone fracture.   2. The lens of the left globe is displaced posteriorly into the vitreous humor, best seen on series 9 image 108. It was appropriately positioned on the prior exam performed on 1/28/2024.   3. Intracranial atherosclerotic calcifications.      CT-HEAD W/O   Final Result   Addendum (preliminary) 1 of 1   LEFT globe injury with displaced LEFT lung is better seen on the    maxillofacial CT performed at the same time      Final      No acute intracranial abnormality.            US-ABDOMEN F.A.S.T. LTD (FOR ED USE ONLY)   Final Result      No free fluid seen in all 4 quadrants.      Negative FAST scan.            DX-CHEST-LIMITED (1 VIEW)   Final Result         1. The endotracheal tube terminates at the lorenza; withdrawal 4 cm is recommended.   2. Advancement of the nasogastric tube 20 cm is recommended. There is gastric distention with air.   3. Low lung volumes.      DX-PELVIS-1 OR 2 VIEWS   Final Result      No           Assessment/Plan  * Acute encephalopathy- (present on admission)  Assessment & Plan  Acute alcohol intoxication with blood level greater than 400, and hypothermia.  Now that these issues have been addressed, the patient is awake alert and interactive  Partially oriented  Continue close monitoring  No focal weakness    Alcohol withdrawal syndrome without complication (HCC)  Assessment & Plan  Patient is starting to exhibit signs of withdrawal  Start scheduled Librium for seizure prophylaxis  Start benzodiazepine withdrawal program  Seizure precautions  Close monitoring of mental status and airway    Macrocytic anemia- (present on admission)  Assessment & Plan  Likely related to the patient's alcohol intake  IV thiamine and  p.o. folate  Dietary consultation to improve nutritional status    Hyponatremia- (present on admission)  Assessment & Plan  Insetting of glucose greater than 600.  Corrected into the normal range keeping glucose in mind    Electrolyte abnormality- (present on admission)  Assessment & Plan  Following and replacing daily magnesium, phosphorus, potassium.  Monitoring daily electrolytes    Lactic acidosis- (present on admission)  Assessment & Plan  Alcohol versus malnutrition versus hypovolemic versus seizure  Resolved    Hypothermia- (present on admission)  Assessment & Plan  Patient was hypothermic after being altered and found down  Now s/p rewarming    Shock (HCC)- (present on admission)  Assessment & Plan  Patient came in hypothermic  Has responded to fluid resuscitation and rewarming     Blood pressure improved, will restart his blood pressure medications with close monitoring    Hyperglycemia- (present on admission)  Assessment & Plan  Glycohemoglobin 4.8  Question stress response  Continue to monitor    Acute alcoholic intoxication with complication (HCC)- (present on admission)  Assessment & Plan  Blood level greater than 400  Encourage alcohol cessation  IV thiamine load  P.o. folate and MVI  Starting to withdrawal  Continue monitoring CIWA protocol      Acute respiratory failure with hypoxia (HCC)- (present on admission)  Assessment & Plan  Patient intubated for airway protection in setting of altered mentation.  Now that the patient is thinking clearly, and with a normal level of consciousness, he is protecting his airway and oxygenating.  Continue O2 and RT protocols  Mobilize  Encourage smoking cessation    Continue weaning off oxygen    Primary hypertension- (present on admission)  Assessment & Plan  As outpatient was maintained on losartan 100 mg daily, Coreg 6.25 mg twice daily, and amlodipine 10 mg daily  Currently going through withdrawals so titration of his chronic outpatient medications is best  left to a later time when he is no longer in DTs    Transaminitis- (present on admission)  Assessment & Plan  Pattern consistent with alcohol which fits with the patient's history  CT imaging shows hepatic steatosis, no other pathology seen  LFTs are trending down  Encourage alcohol cessation  Continue to monitor    NSTEMI (non-ST elevated myocardial infarction) (HCC)- (present on admission)  Assessment & Plan  Insetting of acute hypothermia.  EKG was nonischemic  Echo was negative for wall motion abnormalities         VTE prophylaxis: Lovenox, monitoring platelet count    I have performed a physical exam and reviewed and updated ROS and Plan today (11/1/2024). In review of yesterday's note (10/31/2024), there are no changes except as documented above.      Total time of 54 minutes spent prepping to see patient (e.g. reviewing  tests/imaging results, notes from consultants, bedside nurse, night shift ) obtaining and/or reviewing separately obtained history. Performing a medically appropriate examination and evaluation.  Counseling and educating the patient.  Ordering medications, tests, or procedures.  Referring and communicating with other health care professionals.  Documenting clinical information in EPIC.  Independently interpreting results and communicating results to patient.  Care coordination.

## 2024-11-01 NOTE — PROGRESS NOTES
4 Eyes Skin Assessment Completed by EVA Jones and BRIAN Johnson.    Head Scab, Bruising, and Swelling, patient has swelling in both eyes, significantly greater in left eye disabling patient to open eye; abrasion above right eye on forehead  Ears WDL  Nose WDL  Mouth Redness, mildly cracked/dry lips  Neck WDL  Breast/Chest Blanching, bruising on abdomen and chest  Shoulder Blades WDL  Spine WDL  (R) Arm/Elbow/Hand Blanching  (L) Arm/Elbow/Hand Blanching  Abdomen Blanching and Bruising  Groin Redness and moisture  Scrotum/Coccyx/Buttocks Redness  (R) Leg Blanching and Edema  (L) Leg Blanching and Edema  (R) Heel/Foot/Toe Redness, Blanching, and Swelling, dry/calloused heels  (L) Heel/Foot/Toe Redness, Blanching, and Swelling, dry/calloused heels          Devices In Places Tele Box, Pulse Ox, Condom Cath, and Oxy Mask      Interventions In Place Sacral Mepilex, TAP System, and Pillows    Possible Skin Injury No    Pictures Uploaded Into Epic N/A  Wound Consult Placed N/A  RN Wound Prevention Protocol Ordered No

## 2024-11-01 NOTE — HOSPITAL COURSE
"49 y.o. male who presented 10/30/2024 with a history of ETOH abuse, HLD, tobacco use, CAD.  Found down by family last seen 24hrs previously.  Intubated by EMS.  BAL >400, , LA 7.9, , core temp 27.5c.  extubated 10/29     On my examination the patient states he feels fine.  He does not recall what happened prior to his presentation here, just remembers waking up here in the hospital.  He does admit to drinking \"a lot\" but is reluctant to specify.  He thinks that he was feeling okay prior to being found down, he does not recall feeling of fever or headache or any other symptoms.  At present he does feel \"a little jittery\".  "

## 2024-11-02 PROBLEM — A04.72 C. DIFFICILE DIARRHEA: Status: ACTIVE | Noted: 2024-11-02

## 2024-11-02 PROBLEM — D69.6 THROMBOCYTOPENIA (HCC): Status: ACTIVE | Noted: 2024-11-02

## 2024-11-02 LAB
ALBUMIN SERPL BCP-MCNC: 3.2 G/DL (ref 3.2–4.9)
ALBUMIN/GLOB SERPL: 1.2 G/DL
ALP SERPL-CCNC: 205 U/L (ref 30–99)
ALT SERPL-CCNC: 50 U/L (ref 2–50)
ANION GAP SERPL CALC-SCNC: 13 MMOL/L (ref 7–16)
AST SERPL-CCNC: 122 U/L (ref 12–45)
BASOPHILS # BLD AUTO: 0.8 % (ref 0–1.8)
BASOPHILS # BLD: 0.05 K/UL (ref 0–0.12)
BILIRUB SERPL-MCNC: 1 MG/DL (ref 0.1–1.5)
BUN SERPL-MCNC: 12 MG/DL (ref 8–22)
C DIFF DNA SPEC QL NAA+PROBE: NEGATIVE
C DIFF TOX A+B STL QL IA: POSITIVE
C DIFF TOX GENS STL QL NAA+PROBE: NORMAL
CALCIUM ALBUM COR SERPL-MCNC: 9.5 MG/DL (ref 8.5–10.5)
CALCIUM SERPL-MCNC: 8.9 MG/DL (ref 8.5–10.5)
CHLORIDE SERPL-SCNC: 99 MMOL/L (ref 96–112)
CO2 SERPL-SCNC: 25 MMOL/L (ref 20–33)
CREAT SERPL-MCNC: 0.71 MG/DL (ref 0.5–1.4)
EOSINOPHIL # BLD AUTO: 0.15 K/UL (ref 0–0.51)
EOSINOPHIL NFR BLD: 2.5 % (ref 0–6.9)
ERYTHROCYTE [DISTWIDTH] IN BLOOD BY AUTOMATED COUNT: 56.7 FL (ref 35.9–50)
GFR SERPLBLD CREATININE-BSD FMLA CKD-EPI: 112 ML/MIN/1.73 M 2
GLOBULIN SER CALC-MCNC: 2.7 G/DL (ref 1.9–3.5)
GLUCOSE SERPL-MCNC: 79 MG/DL (ref 65–99)
HCT VFR BLD AUTO: 33.3 % (ref 42–52)
HGB BLD-MCNC: 11.2 G/DL (ref 14–18)
IMM GRANULOCYTES # BLD AUTO: 0.03 K/UL (ref 0–0.11)
IMM GRANULOCYTES NFR BLD AUTO: 0.5 % (ref 0–0.9)
LYMPHOCYTES # BLD AUTO: 0.85 K/UL (ref 1–4.8)
LYMPHOCYTES NFR BLD: 14 % (ref 22–41)
MAGNESIUM SERPL-MCNC: 1.9 MG/DL (ref 1.5–2.5)
MCH RBC QN AUTO: 34.5 PG (ref 27–33)
MCHC RBC AUTO-ENTMCNC: 33.6 G/DL (ref 32.3–36.5)
MCV RBC AUTO: 102.5 FL (ref 81.4–97.8)
MONOCYTES # BLD AUTO: 0.41 K/UL (ref 0–0.85)
MONOCYTES NFR BLD AUTO: 6.7 % (ref 0–13.4)
NEUTROPHILS # BLD AUTO: 4.6 K/UL (ref 1.82–7.42)
NEUTROPHILS NFR BLD: 75.5 % (ref 44–72)
NRBC # BLD AUTO: 0.04 K/UL
NRBC BLD-RTO: 0.7 /100 WBC (ref 0–0.2)
PHOSPHATE SERPL-MCNC: 3.1 MG/DL (ref 2.5–4.5)
PLATELET # BLD AUTO: 49 K/UL (ref 164–446)
PLATELETS.RETICULATED NFR BLD AUTO: 9.4 % (ref 0.6–13.1)
PMV BLD AUTO: 12 FL (ref 9–12.9)
POTASSIUM SERPL-SCNC: 3.9 MMOL/L (ref 3.6–5.5)
PROT SERPL-MCNC: 5.9 G/DL (ref 6–8.2)
RBC # BLD AUTO: 3.25 M/UL (ref 4.7–6.1)
SODIUM SERPL-SCNC: 137 MMOL/L (ref 135–145)
TRIGL SERPL-MCNC: 130 MG/DL (ref 0–149)
WBC # BLD AUTO: 6.1 K/UL (ref 4.8–10.8)

## 2024-11-02 PROCEDURE — 84100 ASSAY OF PHOSPHORUS: CPT

## 2024-11-02 PROCEDURE — 770020 HCHG ROOM/CARE - TELE (206)

## 2024-11-02 PROCEDURE — 700111 HCHG RX REV CODE 636 W/ 250 OVERRIDE (IP): Mod: JZ | Performed by: HOSPITALIST

## 2024-11-02 PROCEDURE — A9270 NON-COVERED ITEM OR SERVICE: HCPCS | Performed by: STUDENT IN AN ORGANIZED HEALTH CARE EDUCATION/TRAINING PROGRAM

## 2024-11-02 PROCEDURE — A9270 NON-COVERED ITEM OR SERVICE: HCPCS | Performed by: HOSPITALIST

## 2024-11-02 PROCEDURE — 83735 ASSAY OF MAGNESIUM: CPT

## 2024-11-02 PROCEDURE — 85055 RETICULATED PLATELET ASSAY: CPT

## 2024-11-02 PROCEDURE — 99233 SBSQ HOSP IP/OBS HIGH 50: CPT | Performed by: HOSPITALIST

## 2024-11-02 PROCEDURE — 84478 ASSAY OF TRIGLYCERIDES: CPT

## 2024-11-02 PROCEDURE — 36415 COLL VENOUS BLD VENIPUNCTURE: CPT

## 2024-11-02 PROCEDURE — 700101 HCHG RX REV CODE 250: Performed by: HOSPITALIST

## 2024-11-02 PROCEDURE — 700102 HCHG RX REV CODE 250 W/ 637 OVERRIDE(OP): Performed by: STUDENT IN AN ORGANIZED HEALTH CARE EDUCATION/TRAINING PROGRAM

## 2024-11-02 PROCEDURE — 700102 HCHG RX REV CODE 250 W/ 637 OVERRIDE(OP): Performed by: HOSPITALIST

## 2024-11-02 PROCEDURE — 85025 COMPLETE CBC W/AUTO DIFF WBC: CPT

## 2024-11-02 PROCEDURE — 700105 HCHG RX REV CODE 258: Performed by: HOSPITALIST

## 2024-11-02 PROCEDURE — 80053 COMPREHEN METABOLIC PANEL: CPT

## 2024-11-02 RX ORDER — FUROSEMIDE 10 MG/ML
20 INJECTION INTRAMUSCULAR; INTRAVENOUS
Status: DISCONTINUED | OUTPATIENT
Start: 2024-11-02 | End: 2024-11-03

## 2024-11-02 RX ORDER — NICOTINE 21 MG/24HR
21 PATCH, TRANSDERMAL 24 HOURS TRANSDERMAL
Status: DISCONTINUED | OUTPATIENT
Start: 2024-11-02 | End: 2024-11-07 | Stop reason: HOSPADM

## 2024-11-02 RX ADMIN — FOLIC ACID 1 MG: 1 TABLET ORAL at 06:34

## 2024-11-02 RX ADMIN — CHLORDIAZEPOXIDE HYDROCHLORIDE 25 MG: 25 CAPSULE ORAL at 17:48

## 2024-11-02 RX ADMIN — NICOTINE TRANSDERMAL SYSTEM 21 MG: 21 PATCH, EXTENDED RELEASE TRANSDERMAL at 17:48

## 2024-11-02 RX ADMIN — LORAZEPAM 0.5 MG: 1 TABLET ORAL at 12:48

## 2024-11-02 RX ADMIN — CHLORDIAZEPOXIDE HYDROCHLORIDE 25 MG: 25 CAPSULE ORAL at 23:29

## 2024-11-02 RX ADMIN — CHLORDIAZEPOXIDE HYDROCHLORIDE 25 MG: 25 CAPSULE ORAL at 00:01

## 2024-11-02 RX ADMIN — AMPICILLIN AND SULBACTAM 3 G: 1; 2 INJECTION, POWDER, FOR SOLUTION INTRAMUSCULAR; INTRAVENOUS at 23:34

## 2024-11-02 RX ADMIN — AMPICILLIN AND SULBACTAM 3 G: 1; 2 INJECTION, POWDER, FOR SOLUTION INTRAMUSCULAR; INTRAVENOUS at 12:37

## 2024-11-02 RX ADMIN — FUROSEMIDE 20 MG: 10 INJECTION, SOLUTION INTRAVENOUS at 17:48

## 2024-11-02 RX ADMIN — AMLODIPINE BESYLATE 10 MG: 10 TABLET ORAL at 06:34

## 2024-11-02 RX ADMIN — FUROSEMIDE 20 MG: 10 INJECTION, SOLUTION INTRAVENOUS at 10:14

## 2024-11-02 RX ADMIN — CHLORDIAZEPOXIDE HYDROCHLORIDE 25 MG: 25 CAPSULE ORAL at 12:48

## 2024-11-02 RX ADMIN — CLONIDINE HYDROCHLORIDE 0.1 MG: 0.1 TABLET ORAL at 07:20

## 2024-11-02 RX ADMIN — CHLORDIAZEPOXIDE HYDROCHLORIDE 25 MG: 25 CAPSULE ORAL at 06:34

## 2024-11-02 RX ADMIN — Medication 125 MG: at 23:31

## 2024-11-02 RX ADMIN — AMPICILLIN AND SULBACTAM 3 G: 1; 2 INJECTION, POWDER, FOR SOLUTION INTRAMUSCULAR; INTRAVENOUS at 17:46

## 2024-11-02 RX ADMIN — AMPICILLIN AND SULBACTAM 3 G: 1; 2 INJECTION, POWDER, FOR SOLUTION INTRAMUSCULAR; INTRAVENOUS at 06:35

## 2024-11-02 RX ADMIN — HYDRALAZINE HYDROCHLORIDE 10 MG: 20 INJECTION INTRAMUSCULAR; INTRAVENOUS at 08:42

## 2024-11-02 RX ADMIN — THERA TABS 1 TABLET: TAB at 06:34

## 2024-11-02 RX ADMIN — CARVEDILOL 6.25 MG: 6.25 TABLET, FILM COATED ORAL at 17:48

## 2024-11-02 RX ADMIN — Medication 125 MG: at 17:48

## 2024-11-02 RX ADMIN — CARVEDILOL 6.25 MG: 6.25 TABLET, FILM COATED ORAL at 08:20

## 2024-11-02 ASSESSMENT — ENCOUNTER SYMPTOMS
MYALGIAS: 0
DOUBLE VISION: 0
DIARRHEA: 1
PND: 0
CLAUDICATION: 0
BLURRED VISION: 0
BACK PAIN: 0
WHEEZING: 0
CHILLS: 0
DIZZINESS: 0
NAUSEA: 0
HEADACHES: 0
HEARTBURN: 0
HEMOPTYSIS: 0
FEVER: 0
DEPRESSION: 0
COUGH: 0
PALPITATIONS: 0
VOMITING: 0
BRUISES/BLEEDS EASILY: 0

## 2024-11-02 ASSESSMENT — LIFESTYLE VARIABLES
PAROXYSMAL SWEATS: NO SWEAT VISIBLE
HEADACHE, FULLNESS IN HEAD: NOT PRESENT
TREMOR: NO TREMOR
ANXIETY: *
ORIENTATION AND CLOUDING OF SENSORIUM: DATE DISORIENTATION BY MORE THAN TWO CALENDAR DAYS
AGITATION: NORMAL ACTIVITY
HEADACHE, FULLNESS IN HEAD: NOT PRESENT
AGITATION: NORMAL ACTIVITY
HEADACHE, FULLNESS IN HEAD: NOT PRESENT
AGITATION: SOMEWHAT MORE THAN NORMAL ACTIVITY
ORIENTATION AND CLOUDING OF SENSORIUM: DATE DISORIENTATION BY NO MORE THAN TWO CALENDAR DAYS
TOTAL SCORE: 6
ANXIETY: NO ANXIETY (AT EASE)
HEADACHE, FULLNESS IN HEAD: NOT PRESENT
PAROXYSMAL SWEATS: NO SWEAT VISIBLE
ANXIETY: MILDLY ANXIOUS
PAROXYSMAL SWEATS: NO SWEAT VISIBLE
TREMOR: NO TREMOR
HEADACHE, FULLNESS IN HEAD: NOT PRESENT
AUDITORY DISTURBANCES: NOT PRESENT
ORIENTATION AND CLOUDING OF SENSORIUM: ORIENTED AND CAN DO SERIAL ADDITIONS
AGITATION: NORMAL ACTIVITY
VISUAL DISTURBANCES: VERY MILD SENSITIVITY
VISUAL DISTURBANCES: NOT PRESENT
TOTAL SCORE: VERY MILD ITCHING, PINS AND NEEDLES SENSATION, BURNING OR NUMBNESS
TOTAL SCORE: 4
AUDITORY DISTURBANCES: NOT PRESENT
ANXIETY: MILDLY ANXIOUS
TREMOR: NO TREMOR
ORIENTATION AND CLOUDING OF SENSORIUM: ORIENTED AND CAN DO SERIAL ADDITIONS
PAROXYSMAL SWEATS: NO SWEAT VISIBLE
NAUSEA AND VOMITING: NO NAUSEA AND NO VOMITING
AGITATION: *
ANXIETY: NO ANXIETY (AT EASE)
HEADACHE, FULLNESS IN HEAD: NOT PRESENT
VISUAL DISTURBANCES: NOT PRESENT
TOTAL SCORE: 2
AUDITORY DISTURBANCES: NOT PRESENT
AUDITORY DISTURBANCES: NOT PRESENT
TREMOR: *
ANXIETY: NO ANXIETY (AT EASE)
VISUAL DISTURBANCES: NOT PRESENT
ORIENTATION AND CLOUDING OF SENSORIUM: ORIENTED AND CAN DO SERIAL ADDITIONS
TOTAL SCORE: 4
AGITATION: NORMAL ACTIVITY
PAROXYSMAL SWEATS: BARELY PERCEPTIBLE SWEATING. PALMS MOIST
TREMOR: NO TREMOR
AUDITORY DISTURBANCES: NOT PRESENT
TOTAL SCORE: 0
VISUAL DISTURBANCES: NOT PRESENT
NAUSEA AND VOMITING: NO NAUSEA AND NO VOMITING
NAUSEA AND VOMITING: NO NAUSEA AND NO VOMITING
VISUAL DISTURBANCES: NOT PRESENT
AUDITORY DISTURBANCES: NOT PRESENT
NAUSEA AND VOMITING: NO NAUSEA AND NO VOMITING
ORIENTATION AND CLOUDING OF SENSORIUM: ORIENTED AND CAN DO SERIAL ADDITIONS
NAUSEA AND VOMITING: NO NAUSEA AND NO VOMITING
TOTAL SCORE: 2
NAUSEA AND VOMITING: NO NAUSEA AND NO VOMITING
TREMOR: TREMOR NOT VISIBLE BUT CAN BE FELT, FINGERTIP TO FINGERTIP
PAROXYSMAL SWEATS: NO SWEAT VISIBLE

## 2024-11-02 ASSESSMENT — FIBROSIS 4 INDEX: FIB4 SCORE: 10.27

## 2024-11-02 ASSESSMENT — PAIN DESCRIPTION - PAIN TYPE
TYPE: ACUTE PAIN
TYPE: ACUTE PAIN

## 2024-11-02 NOTE — PROGRESS NOTES
Report given to Azalea VELASQUEZ. Pt transferred to [id number], tele box changed and monitor room called. All belongings with pt on transfer.

## 2024-11-02 NOTE — PROGRESS NOTES
Bedside report received from off going RN/tech: EVA Godinez, assumed care of patient.     Fall Risk Score: HIGH RISK  Fall risk interventions in place: Place yellow fall risk ID band on patient, Provide patient/family education based on risk assessment, Educate patient/family to call staff for assistance when getting out of bed, Place fall precaution signage outside patient door, Place patient in room close to nursing station, Utilize bed/chair fall alarm, Notify charge of high risk for huddle, Tele-sitter, and Bed alarm connected correctly  Bed type: Low air loss (Isidro Score less than 17 interventions in place)  Patient on cardiac monitor: Yes  IVF/IV medications: Not Applicable   Oxygen: How many liters 4L  Bedside sitter: Not Applicable   Isolation: Isolation precautions in place

## 2024-11-02 NOTE — PROGRESS NOTES
Bedside report received from off going RN/Nurse Apprentice: Neisha/Eli, assumed care of patient.     Fall Risk Score: HIGH RISK  Fall risk interventions in place: Place yellow fall risk ID band on patient, Provide patient/family education based on risk assessment, Educate patient/family to call staff for assistance when getting out of bed, Place fall precaution signage outside patient door, Place patient in room close to nursing station, Utilize bed/chair fall alarm, Notify charge of high risk for huddle, and Bed alarm connected correctly  Bed type: Low air loss (Isidro Score less than 17 interventions in place)  Patient on cardiac monitor: Yes  IVF/IV medications: Not Applicable   Oxygen: How many liters 5L, Traced the line to wall oxygen, and No oxygen tank in room  Bedside sitter: Telesitter  Isolation: Isolation precautions in place

## 2024-11-02 NOTE — ASSESSMENT & PLAN NOTE
Started on oral vancomycin  Continue isolation per protocol    Continue po vanco  Diarrhea improved.

## 2024-11-02 NOTE — PROGRESS NOTES
"Hospital Medicine Daily Progress Note    Date of Service  11/2/2024    Chief Complaint  Jethro Cook is a 49 y.o. male admitted 10/30/2024 with alcohol withdrawal, encephalopathy    Hospital Course  49 y.o. male who presented 10/30/2024 with a history of ETOH abuse, HLD, tobacco use, CAD.  Found down by family last seen 24hrs previously.  Intubated by EMS.  BAL >400, , LA 7.9, , core temp 27.5c.  extubated 10/29     On my examination the patient states he feels fine.  He does not recall what happened prior to his presentation here, just remembers waking up here in the hospital.  He does admit to drinking \"a lot\" but is reluctant to specify.  He thinks that he was feeling okay prior to being found down, he does not recall feeling of fever or headache or any other symptoms.  At present he does feel \"a little jittery\".    Interval Problem Update  11/1 patient is resting in bed, patient is new to me today, patient having diarrhea, have ordered to check for C. difficile infection, I have restarted blood pressure medications, also his gabapentin, continue CIWA protocol, monitoring electrolytes, patient is able to move his extremities, patient general very weak, denies any shortness of breath, continue close monitoring, continue telemetry, discussed with bedside nurse charge nurse  pharmacist, follow-up blood work in a.m.  11/2 patient is resting in bed, still having diarrhea, C. difficile came back positive, started on oral vancomycin, started on IV Lasix, patient mental status is improved, no focal weakness, continue tolerating diet, replace electrolytes as needed, PT OT pending, discussed with bedside nurse charge nurse  pharmacist, I have ordered blood work for in a.m., monitoring I's and O's.    I have discussed this patient's plan of care and discharge plan at IDT rounds today with Case Management, Nursing, Nursing leadership, and other members of the IDT " team.    Consultants/Specialty  Critical care    Code Status  Full Code    Disposition  The patient is not medically cleared for discharge to home or a post-acute facility.      I have placed the appropriate orders for post-discharge needs.    Review of Systems  Review of Systems   Unable to perform ROS: Mental acuity   Constitutional:  Negative for chills and fever.   Eyes:  Negative for blurred vision and double vision.   Respiratory:  Negative for cough, hemoptysis and wheezing.    Cardiovascular:  Negative for chest pain, palpitations, claudication, leg swelling and PND.   Gastrointestinal:  Positive for diarrhea. Negative for heartburn, nausea and vomiting.   Genitourinary:  Negative for hematuria and urgency.   Musculoskeletal:  Negative for back pain and myalgias.   Skin:  Negative for rash.   Neurological:  Negative for dizziness and headaches.   Endo/Heme/Allergies:  Does not bruise/bleed easily.   Psychiatric/Behavioral:  Negative for depression.         Physical Exam  Temp:  [36.5 °C (97.7 °F)-36.8 °C (98.2 °F)] 36.5 °C (97.7 °F)  Pulse:  [] 91  Resp:  [18-20] 18  BP: (124-174)/() 155/115  SpO2:  [93 %-96 %] 94 %    Physical Exam  Vitals and nursing note reviewed.   Constitutional:       Appearance: He is ill-appearing.   HENT:      Head: Normocephalic.   Eyes:      General:         Right eye: No discharge.         Left eye: No discharge.      Conjunctiva/sclera: Conjunctivae normal.   Cardiovascular:      Rate and Rhythm: Regular rhythm. Tachycardia present.      Pulses: Normal pulses.      Heart sounds: Normal heart sounds.   Pulmonary:      Effort: Pulmonary effort is normal.      Breath sounds: Normal breath sounds.   Abdominal:      General: Bowel sounds are normal. There is no distension.      Tenderness: There is no abdominal tenderness.   Musculoskeletal:         General: Normal range of motion.      Cervical back: Normal range of motion and neck supple.      Right lower leg: No edema.       Left lower leg: No edema.   Skin:     General: Skin is warm.      Capillary Refill: Capillary refill takes less than 2 seconds.   Neurological:      General: No focal deficit present.      Mental Status: He is alert.      Comments: Partially oriented, able to move all his extremities.   Psychiatric:         Mood and Affect: Mood normal.         Fluids    Intake/Output Summary (Last 24 hours) at 11/2/2024 1647  Last data filed at 11/2/2024 1154  Gross per 24 hour   Intake 200 ml   Output 2875 ml   Net -2675 ml        Laboratory  Recent Labs     10/31/24  0515 11/01/24  0127 11/02/24  0332   WBC 4.7* 6.0 6.1   RBC 3.14* 3.21* 3.25*   HEMOGLOBIN 10.7* 10.9* 11.2*   HEMATOCRIT 33.2* 33.3* 33.3*   .7* 103.7* 102.5*   MCH 34.1* 34.0* 34.5*   MCHC 32.2* 32.7 33.6   RDW 60.0* 59.3* 56.7*   PLATELETCT 168 84* 49*   MPV 10.1 11.3 12.0     Recent Labs     11/01/24  0127 11/01/24  1036 11/02/24  0332   SODIUM 136 139 137   POTASSIUM 3.7 3.4* 3.9   CHLORIDE 100 99 99   CO2 28 26 25   GLUCOSE 78 80 79   BUN 11 10 12   CREATININE 0.84 0.71 0.71   CALCIUM 8.3* 8.9 8.9               Recent Labs     11/02/24  0332   TRIGLYCERIDE 130       Imaging  DX-CHEST-PORTABLE (1 VIEW)   Final Result   Impression:      1. Shallow breath post extubation.      2. Mild infiltrates. No effusions.                     DX-CHEST-PORTABLE (1 VIEW)   Final Result      Bilateral infiltrates, left worse than right.      EC-ECHOCARDIOGRAM COMPLETE W/ CONT   Final Result      DX-CHEST-LIMITED (1 VIEW)   Final Result         1. Interval placement of a right internal jugular central venous access catheter terminating over the superior vena cava. No postprocedure visible pneumothorax.   2. Adjustment of the endotracheal and nasogastric tubes, now appropriately positioned.   3. Resolution of gastric distention with air.      CT-LSPINE W/O PLUS RECONS   Final Result      1. No acute osseous abnormality involving the lumbar spine.   2. L5-S1  degenerative disc disease and facet arthrosis.   3. For imaging findings regarding the remainder of the abdomen and pelvis, please see CT of the chest, abdomen and pelvis performed the same day.      CT-TSPINE W/O PLUS RECONS   Final Result      1. No acute posttraumatic imaging findings in the thoracic spine.   2. Appropriate positions of the endotracheal and nasogastric tubes.   3. For the imaging findings in the remainder of the chest and abdomen, please see CT chest, abdomen and pelvis performed the same day.      CT-CHEST,ABDOMEN,PELVIS WITH   Final Result      1.  BILATERAL lower lobe atelectasis. Superimposed pneumonia not excluded   2.  Edema in the retroperitoneum could indicate pancreatitis, gastritis or duodenitis   3.  Hepatic steatosis   4.  Trace ascites   5.  LEFT inguinal hernia contains fat   6.  Thyroid gland is enlarged and heterogenous, recommend correlation with laboratory studies and ultrasound when clinically appropriate      CT-CSPINE WITHOUT PLUS RECONS   Final Result      1.  No acute abnormality identified.   2.  Multilevel multifactorial degenerative changes   3.  Heterogeneous enlarged thyroid, recommend correlation with laboratory studies and ultrasound when clinically appropriate      CT-MAXILLOFACIAL W/O PLUS RECONS   Final Result      1. No acute facial bone fracture.   2. The lens of the left globe is displaced posteriorly into the vitreous humor, best seen on series 9 image 108. It was appropriately positioned on the prior exam performed on 1/28/2024.   3. Intracranial atherosclerotic calcifications.      CT-HEAD W/O   Final Result   Addendum (preliminary) 1 of 1   LEFT globe injury with displaced LEFT lung is better seen on the    maxillofacial CT performed at the same time      Final      No acute intracranial abnormality.            US-ABDOMEN F.A.S.T. LTD (FOR ED USE ONLY)   Final Result      No free fluid seen in all 4 quadrants.      Negative FAST scan.             DX-CHEST-LIMITED (1 VIEW)   Final Result         1. The endotracheal tube terminates at the lorenza; withdrawal 4 cm is recommended.   2. Advancement of the nasogastric tube 20 cm is recommended. There is gastric distention with air.   3. Low lung volumes.      DX-PELVIS-1 OR 2 VIEWS   Final Result      No           Assessment/Plan  * Acute encephalopathy- (present on admission)  Assessment & Plan  Acute alcohol intoxication with blood level greater than 400, and hypothermia.  Now that these issues have been addressed, the patient is awake alert and interactive  Partially oriented  Continue close monitoring  No focal weakness  Continue monitoring, patient appears to be more oriented no focal weakness    Thrombocytopenia (HCC)  Assessment & Plan  Hold the Lovenox  Platelet count 49    C. difficile diarrhea  Assessment & Plan  Started on oral vancomycin  Continue isolation per protocol    Alcohol withdrawal syndrome without complication (HCC)  Assessment & Plan  Patient is starting to exhibit signs of withdrawal  Start scheduled Librium for seizure prophylaxis  Start benzodiazepine withdrawal program  Seizure precautions  Close monitoring of mental status and airway    Macrocytic anemia- (present on admission)  Assessment & Plan  Likely related to the patient's alcohol intake  IV thiamine and p.o. folate  Dietary consultation to improve nutritional status    Hyponatremia- (present on admission)  Assessment & Plan  Insetting of glucose greater than 600.  Corrected into the normal range keeping glucose in mind    Electrolyte abnormality- (present on admission)  Assessment & Plan  Following and replacing daily magnesium, phosphorus, potassium.  Monitoring daily electrolytes    Lactic acidosis- (present on admission)  Assessment & Plan  Alcohol versus malnutrition versus hypovolemic versus seizure  Resolved    Hypothermia- (present on admission)  Assessment & Plan  Patient was hypothermic after being altered and found  down  Now s/p rewarming    Shock (HCC)- (present on admission)  Assessment & Plan  Patient came in hypothermic  Has responded to fluid resuscitation and rewarming     Blood pressure improved, will restart his blood pressure medications with close monitoring    Hyperglycemia- (present on admission)  Assessment & Plan  Glycohemoglobin 4.8  Question stress response  Continue to monitor    Acute alcoholic intoxication with complication (HCC)- (present on admission)  Assessment & Plan  Blood level greater than 400  Encourage alcohol cessation  IV thiamine load  P.o. folate and MVI  Starting to withdrawal  Continue monitoring CIWA protocol      Acute respiratory failure with hypoxia (HCC)- (present on admission)  Assessment & Plan  Patient intubated for airway protection in setting of altered mentation.  Now that the patient is thinking clearly, and with a normal level of consciousness, he is protecting his airway and oxygenating.  Continue O2 and RT protocols  Mobilize  Encourage smoking cessation    Continue weaning off oxygen  Start on IV Lasix  Continue monitoring I's and O's    Primary hypertension- (present on admission)  Assessment & Plan  As outpatient was maintained on losartan 100 mg daily, Coreg 6.25 mg twice daily, and amlodipine 10 mg daily  Currently going through withdrawals so titration of his chronic outpatient medications is best left to a later time when he is no longer in DTs    Transaminitis- (present on admission)  Assessment & Plan  Pattern consistent with alcohol which fits with the patient's history  CT imaging shows hepatic steatosis, no other pathology seen  LFTs are trending down  Encourage alcohol cessation  Continue to monitor, slowly trending down    NSTEMI (non-ST elevated myocardial infarction) (HCC)- (present on admission)  Assessment & Plan  Insetting of acute hypothermia.  EKG was nonischemic  Echo was negative for wall motion abnormalities         VTE prophylaxis: Lovenox, monitoring  platelet count    I have performed a physical exam and reviewed and updated ROS and Plan today (11/2/2024). In review of yesterday's note (11/1/2024), there are no changes except as documented above.    Total time of 52 minutes spent prepping to see patient (e.g. reviewing  tests/imaging results, notes from consultants, bedside nurse, night shift ) obtaining and/or reviewing separately obtained history. Performing a medically appropriate examination and evaluation.  Counseling and educating the patient.  Ordering medications, tests, or procedures.  Referring and communicating with other health care professionals.  Documenting clinical information in EPIC.  Independently interpreting results and communicating results to patient.  Care coordination.

## 2024-11-03 LAB
ALBUMIN SERPL BCP-MCNC: 3.2 G/DL (ref 3.2–4.9)
ALBUMIN/GLOB SERPL: 1.1 G/DL
ALP SERPL-CCNC: 220 U/L (ref 30–99)
ALT SERPL-CCNC: 45 U/L (ref 2–50)
ANION GAP SERPL CALC-SCNC: 14 MMOL/L (ref 7–16)
AST SERPL-CCNC: 98 U/L (ref 12–45)
BASOPHILS # BLD AUTO: 0.9 % (ref 0–1.8)
BASOPHILS # BLD: 0.07 K/UL (ref 0–0.12)
BILIRUB SERPL-MCNC: 0.9 MG/DL (ref 0.1–1.5)
BUN SERPL-MCNC: 16 MG/DL (ref 8–22)
CALCIUM ALBUM COR SERPL-MCNC: 9.6 MG/DL (ref 8.5–10.5)
CALCIUM SERPL-MCNC: 9 MG/DL (ref 8.5–10.5)
CHLORIDE SERPL-SCNC: 99 MMOL/L (ref 96–112)
CO2 SERPL-SCNC: 25 MMOL/L (ref 20–33)
CREAT SERPL-MCNC: 0.72 MG/DL (ref 0.5–1.4)
EOSINOPHIL # BLD AUTO: 0.25 K/UL (ref 0–0.51)
EOSINOPHIL NFR BLD: 3.1 % (ref 0–6.9)
ERYTHROCYTE [DISTWIDTH] IN BLOOD BY AUTOMATED COUNT: 55.7 FL (ref 35.9–50)
ERYTHROCYTE [DISTWIDTH] IN BLOOD BY AUTOMATED COUNT: 56.3 FL (ref 35.9–50)
GFR SERPLBLD CREATININE-BSD FMLA CKD-EPI: 111 ML/MIN/1.73 M 2
GLOBULIN SER CALC-MCNC: 2.8 G/DL (ref 1.9–3.5)
GLUCOSE SERPL-MCNC: 93 MG/DL (ref 65–99)
HCT VFR BLD AUTO: 34.1 % (ref 42–52)
HCT VFR BLD AUTO: 34.3 % (ref 42–52)
HGB BLD-MCNC: 11.2 G/DL (ref 14–18)
HGB BLD-MCNC: 11.5 G/DL (ref 14–18)
IMM GRANULOCYTES # BLD AUTO: 0.03 K/UL (ref 0–0.11)
IMM GRANULOCYTES NFR BLD AUTO: 0.4 % (ref 0–0.9)
LYMPHOCYTES # BLD AUTO: 0.72 K/UL (ref 1–4.8)
LYMPHOCYTES NFR BLD: 9.1 % (ref 22–41)
MAGNESIUM SERPL-MCNC: 1.8 MG/DL (ref 1.5–2.5)
MCH RBC QN AUTO: 33.1 PG (ref 27–33)
MCH RBC QN AUTO: 34.3 PG (ref 27–33)
MCHC RBC AUTO-ENTMCNC: 32.8 G/DL (ref 32.3–36.5)
MCHC RBC AUTO-ENTMCNC: 33.5 G/DL (ref 32.3–36.5)
MCV RBC AUTO: 100.9 FL (ref 81.4–97.8)
MCV RBC AUTO: 102.4 FL (ref 81.4–97.8)
MONOCYTES # BLD AUTO: 0.58 K/UL (ref 0–0.85)
MONOCYTES NFR BLD AUTO: 7.3 % (ref 0–13.4)
NEUTROPHILS # BLD AUTO: 6.3 K/UL (ref 1.82–7.42)
NEUTROPHILS NFR BLD: 79.2 % (ref 44–72)
NRBC # BLD AUTO: 0.02 K/UL
NRBC BLD-RTO: 0.3 /100 WBC (ref 0–0.2)
PHOSPHATE SERPL-MCNC: 4 MG/DL (ref 2.5–4.5)
PLATELET # BLD AUTO: 48 K/UL (ref 164–446)
PLATELET # BLD AUTO: 49 K/UL (ref 164–446)
PLATELETS.RETICULATED NFR BLD AUTO: 11.5 % (ref 0.6–13.1)
PLATELETS.RETICULATED NFR BLD AUTO: 11.9 % (ref 0.6–13.1)
PMV BLD AUTO: 10.6 FL (ref 9–12.9)
PMV BLD AUTO: 10.9 FL (ref 9–12.9)
POTASSIUM SERPL-SCNC: 3.2 MMOL/L (ref 3.6–5.5)
PROT SERPL-MCNC: 6 G/DL (ref 6–8.2)
RBC # BLD AUTO: 3.35 M/UL (ref 4.7–6.1)
RBC # BLD AUTO: 3.38 M/UL (ref 4.7–6.1)
SODIUM SERPL-SCNC: 138 MMOL/L (ref 135–145)
WBC # BLD AUTO: 7.8 K/UL (ref 4.8–10.8)
WBC # BLD AUTO: 8 K/UL (ref 4.8–10.8)

## 2024-11-03 PROCEDURE — 700111 HCHG RX REV CODE 636 W/ 250 OVERRIDE (IP): Mod: JZ | Performed by: HOSPITALIST

## 2024-11-03 PROCEDURE — 700102 HCHG RX REV CODE 250 W/ 637 OVERRIDE(OP): Performed by: HOSPITALIST

## 2024-11-03 PROCEDURE — 94760 N-INVAS EAR/PLS OXIMETRY 1: CPT

## 2024-11-03 PROCEDURE — 97535 SELF CARE MNGMENT TRAINING: CPT

## 2024-11-03 PROCEDURE — 80053 COMPREHEN METABOLIC PANEL: CPT

## 2024-11-03 PROCEDURE — 700101 HCHG RX REV CODE 250: Performed by: HOSPITALIST

## 2024-11-03 PROCEDURE — A9270 NON-COVERED ITEM OR SERVICE: HCPCS | Mod: JZ

## 2024-11-03 PROCEDURE — 770020 HCHG ROOM/CARE - TELE (206)

## 2024-11-03 PROCEDURE — 84100 ASSAY OF PHOSPHORUS: CPT

## 2024-11-03 PROCEDURE — A9270 NON-COVERED ITEM OR SERVICE: HCPCS | Performed by: HOSPITALIST

## 2024-11-03 PROCEDURE — 700102 HCHG RX REV CODE 250 W/ 637 OVERRIDE(OP): Mod: JZ

## 2024-11-03 PROCEDURE — 700105 HCHG RX REV CODE 258: Performed by: HOSPITALIST

## 2024-11-03 PROCEDURE — 85055 RETICULATED PLATELET ASSAY: CPT | Mod: 91

## 2024-11-03 PROCEDURE — A9270 NON-COVERED ITEM OR SERVICE: HCPCS | Performed by: STUDENT IN AN ORGANIZED HEALTH CARE EDUCATION/TRAINING PROGRAM

## 2024-11-03 PROCEDURE — 97163 PT EVAL HIGH COMPLEX 45 MIN: CPT

## 2024-11-03 PROCEDURE — 700102 HCHG RX REV CODE 250 W/ 637 OVERRIDE(OP): Performed by: STUDENT IN AN ORGANIZED HEALTH CARE EDUCATION/TRAINING PROGRAM

## 2024-11-03 PROCEDURE — 36415 COLL VENOUS BLD VENIPUNCTURE: CPT

## 2024-11-03 PROCEDURE — 83735 ASSAY OF MAGNESIUM: CPT

## 2024-11-03 PROCEDURE — 85027 COMPLETE CBC AUTOMATED: CPT

## 2024-11-03 PROCEDURE — 99232 SBSQ HOSP IP/OBS MODERATE 35: CPT | Performed by: HOSPITALIST

## 2024-11-03 PROCEDURE — 85025 COMPLETE CBC W/AUTO DIFF WBC: CPT

## 2024-11-03 PROCEDURE — 700111 HCHG RX REV CODE 636 W/ 250 OVERRIDE (IP): Performed by: STUDENT IN AN ORGANIZED HEALTH CARE EDUCATION/TRAINING PROGRAM

## 2024-11-03 RX ORDER — POTASSIUM CHLORIDE 1500 MG/1
40 TABLET, EXTENDED RELEASE ORAL ONCE
Status: COMPLETED | OUTPATIENT
Start: 2024-11-03 | End: 2024-11-03

## 2024-11-03 RX ORDER — LOSARTAN POTASSIUM 50 MG/1
50 TABLET ORAL DAILY
Status: DISCONTINUED | OUTPATIENT
Start: 2024-11-03 | End: 2024-11-07 | Stop reason: HOSPADM

## 2024-11-03 RX ADMIN — GABAPENTIN 100 MG: 100 CAPSULE ORAL at 07:53

## 2024-11-03 RX ADMIN — AMPICILLIN AND SULBACTAM 3 G: 1; 2 INJECTION, POWDER, FOR SOLUTION INTRAMUSCULAR; INTRAVENOUS at 05:29

## 2024-11-03 RX ADMIN — POTASSIUM CHLORIDE 40 MEQ: 1500 TABLET, EXTENDED RELEASE ORAL at 05:22

## 2024-11-03 RX ADMIN — AMPICILLIN AND SULBACTAM 3 G: 1; 2 INJECTION, POWDER, FOR SOLUTION INTRAMUSCULAR; INTRAVENOUS at 12:39

## 2024-11-03 RX ADMIN — POTASSIUM CHLORIDE 40 MEQ: 1500 TABLET, EXTENDED RELEASE ORAL at 07:51

## 2024-11-03 RX ADMIN — FOLIC ACID 1 MG: 1 TABLET ORAL at 05:22

## 2024-11-03 RX ADMIN — CARVEDILOL 6.25 MG: 6.25 TABLET, FILM COATED ORAL at 07:53

## 2024-11-03 RX ADMIN — Medication 125 MG: at 12:42

## 2024-11-03 RX ADMIN — ACETAMINOPHEN 650 MG: 325 TABLET ORAL at 07:51

## 2024-11-03 RX ADMIN — LOSARTAN POTASSIUM 50 MG: 50 TABLET, FILM COATED ORAL at 07:50

## 2024-11-03 RX ADMIN — Medication 125 MG: at 18:19

## 2024-11-03 RX ADMIN — Medication 125 MG: at 05:22

## 2024-11-03 RX ADMIN — ONDANSETRON 4 MG: 4 TABLET, ORALLY DISINTEGRATING ORAL at 07:50

## 2024-11-03 RX ADMIN — Medication 125 MG: at 23:44

## 2024-11-03 RX ADMIN — NICOTINE TRANSDERMAL SYSTEM 21 MG: 21 PATCH, EXTENDED RELEASE TRANSDERMAL at 05:22

## 2024-11-03 RX ADMIN — AMPICILLIN AND SULBACTAM 3 G: 1; 2 INJECTION, POWDER, FOR SOLUTION INTRAMUSCULAR; INTRAVENOUS at 17:57

## 2024-11-03 RX ADMIN — FUROSEMIDE 20 MG: 10 INJECTION, SOLUTION INTRAVENOUS at 05:22

## 2024-11-03 RX ADMIN — CHLORDIAZEPOXIDE HYDROCHLORIDE 25 MG: 25 CAPSULE ORAL at 05:26

## 2024-11-03 RX ADMIN — THERA TABS 1 TABLET: TAB at 05:22

## 2024-11-03 RX ADMIN — AMLODIPINE BESYLATE 10 MG: 10 TABLET ORAL at 05:22

## 2024-11-03 ASSESSMENT — ENCOUNTER SYMPTOMS
HEMOPTYSIS: 0
VOMITING: 0
PALPITATIONS: 0
CLAUDICATION: 0
DIARRHEA: 1
DIZZINESS: 0
BLURRED VISION: 0
NAUSEA: 0
HEARTBURN: 0
COUGH: 0
MYALGIAS: 0
HEADACHES: 0
DOUBLE VISION: 0
WHEEZING: 0
PND: 0
CHILLS: 0
BACK PAIN: 0
BRUISES/BLEEDS EASILY: 0
FEVER: 0
DEPRESSION: 0

## 2024-11-03 ASSESSMENT — COGNITIVE AND FUNCTIONAL STATUS - GENERAL
STANDING UP FROM CHAIR USING ARMS: A LOT
MOVING FROM LYING ON BACK TO SITTING ON SIDE OF FLAT BED: A LOT
TURNING FROM BACK TO SIDE WHILE IN FLAT BAD: A LOT
SUGGESTED CMS G CODE MODIFIER MOBILITY: CL
MOVING TO AND FROM BED TO CHAIR: A LOT
WALKING IN HOSPITAL ROOM: TOTAL
MOBILITY SCORE: 10
CLIMB 3 TO 5 STEPS WITH RAILING: TOTAL

## 2024-11-03 ASSESSMENT — PAIN DESCRIPTION - PAIN TYPE: TYPE: ACUTE PAIN

## 2024-11-03 ASSESSMENT — FIBROSIS 4 INDEX: FIB4 SCORE: 15.22

## 2024-11-03 ASSESSMENT — GAIT ASSESSMENTS: GAIT LEVEL OF ASSIST: UNABLE TO PARTICIPATE

## 2024-11-03 NOTE — PROGRESS NOTES
Bedside shift report received form day shift RN, assumed care of pt. Pt is A&O3-4, 5L NC, on tele. Tele sitter is monitoring patient. Bed in lowest position, call light within reach, bed alarm on. PT verbalized no needs.

## 2024-11-03 NOTE — PROGRESS NOTES
Bedside report received from off going RN/tech: Shamir, assumed care of patient.     Fall Risk Score: HIGH RISK  Fall risk interventions in place: Place yellow fall risk ID band on patient, Provide patient/family education based on risk assessment, Educate patient/family to call staff for assistance when getting out of bed, Place fall precaution signage outside patient door, Place patient in room close to nursing station, Utilize bed/chair fall alarm, and Notify charge of high risk for huddle  Bed type: Regular (Isidro Score less than 17 interventions in place)  Patient on cardiac monitor: Yes  IVF/IV medications: Infusion per MAR (List Med(s)) Unasyn , Lasix  Oxygen: How many liters 2L  Bedside sitter: Tele-  sitter (1:1 feeding)  Isolation: Isolation precautions in place - SKYLER Diff

## 2024-11-03 NOTE — CARE PLAN
The patient is Stable - Low risk of patient condition declining or worsening    Shift Goals  Clinical Goals: Q4 neuro, CIWA, Q2 turn, electrolyte monitor, skin protection  Patient Goals: rest  Family Goals: updated via phone call    Progress made toward(s) clinical / shift goals:    Problem: Knowledge Deficit - Standard  Goal: Patient and family/care givers will demonstrate understanding of plan of care, disease process/condition, diagnostic tests and medications  Outcome: Not Progressing  Note: PT A&O 3-4, fatigued, and drowsy. Needs reinforcement on medical knowledge, and plan of care     Problem: Pain - Standard  Goal: Alleviation of pain or a reduction in pain to the patient’s comfort goal  Outcome: Progressing  Flowsheets (Taken 11/3/2024 0310)  Pain Rating Scale (NPRS): 0  Note: Patient declines having pain     Problem: Skin Integrity  Goal: Skin integrity is maintained or improved  Outcome: Progressing  Note: Pt on Q2 turn schedule. Blanchable redness on sacrum, barrier paste applied. Heel mepi and sacral mepi applied     Problem: Fall Risk  Goal: Patient will remain free from falls  Outcome: Progressing  Note: Pt free from falls. Bed alarm on, bed in lowest position, call light within reach. Tele sitter monitoring     Problem: Psychosocial  Goal: Patient's level of anxiety will decrease  Outcome: Progressing  Goal: Spiritual and cultural needs incorporated into hospitalization  Outcome: Progressing     Problem: Seizure Precautions  Goal: Implementation of seizure precautions  Outcome: Met       Patient is not progressing towards the following goals:      Problem: Knowledge Deficit - Standard  Goal: Patient and family/care givers will demonstrate understanding of plan of care, disease process/condition, diagnostic tests and medications  Outcome: Not Progressing  Note: PT A&O 3-4, fatigued, and drowsy. Needs reinforcement on medical knowledge, and plan of care

## 2024-11-03 NOTE — THERAPY
Physical Therapy Contact Note    Patient Name: Jethro Cook  Age:  49 y.o., Sex:  male  Medical Record #: [id number]  Today's Date: 11/2/2024    Pt was found down unconscious with abrasions to left head, and blown left pupil. Pt was intubated en route, SVT, palpitations, and an elevated troponin. Patient reports that while at work he felt dizzy and lightheaded. After that he had 5-6/10 chest pain for ~8 hours. Left orbital fracture. PNA. Septic shock. Hypokalemia, hypocalcemia, alcohol intoxication. Acute encephalopathy.  Hx of alcohol use, SVT, HTN, ETOH WD szs, CVA, left eye injury 2/24 due to fall and hitting a hearth.     11/02/24 1608   Initial Contact Note    Initial Contact Note Order Received and Verified, Physical Therapy Evaluation in Progress with Full Report to Follow.   Precautions   Precautions Fall Risk   Interdisciplinary Plan of Care Collaboration   IDT Collaboration with  Certified Nursing Assistant;Nursing   Collaboration Comments PT consult received, chart reviewed. Attempted to assist pt, pt's /115. Pt with limited following due to lethargic/confused. Will hold today and attempt at another time.   Session Information   Date / Session Number  11/2- hold (lian)

## 2024-11-03 NOTE — THERAPY
"Physical Therapy   Initial Evaluation     Patient Name: Jethro Cook  Age:  49 y.o., Sex:  male  Medical Record #: [id number]  Today's Date: 11/3/2024     Precautions  Precautions: Fall Risk  Comments: dejuan YANCEY pupil    Assessment  Patient is 49 y.o. male admitted after being found down. Diagnosis of encephalopathy, PNA, septic shock, alcohol w/d. PMHx of ETOH abuse, HLD, tobacco use, CAD. Pt intubated, then extubated 10/29. C-diff +. Pt presents very delayed, impaired cognition, vision, BUE coordination and weakness R worse than L, impaired sequencing, balance, activity tolerance, endurance, and overall mobility. Pt mobilized as detailed below. Was able to stand with ModA with FWW. Recommend placement, PM&R Consult. Pt would benefit from continued acute IP PT services to address said deficits.     Plan    Physical Therapy Initial Treatment Plan   Treatment Plan : Bed Mobility, Equipment, Family / Caregiver Training, Gait Training, Manual Therapy, Neuro Re-Education / Balance, Self Care / Home Evaluation, Stair Training, Therapeutic Activities, Therapeutic Exercise  Treatment Frequency: 4 Times per Week  Duration: Until Therapy Goals Met    DC Equipment Recommendations: Unable to determine at this time  Discharge Recommendations: Recommend post-acute placement for additional physical therapy services prior to discharge home (PM&R consult)       Subjective    \"I don't feel any pain.\"      Objective     11/03/24 0950   Vitals   O2 (LPM) 2   O2 Delivery Device Silicone Nasal Cannula   Vitals Comments new O2 needs, on RA at baseline   Pain 0 - 10 Group   Therapist Pain Assessment Post Activity Pain Same as Prior to Activity;Nurse Notified;0   Prior Living Situation   Housing / Facility 1 Story House   Steps Into Home 20   Steps In Home 0   Rail None   Equipment Owned None   Lives with - Patient's Self Care Capacity   (Son)   Comments different home set up in chart, pt questionable historian. Reports son runs a " business and is in and out of the house   Prior Level of Functional Mobility   Bed Mobility Independent   Transfer Status Independent   Ambulation Independent   Ambulation Distance   (community)   Assistive Devices Used None   Stairs Independent   History of Falls   History of Falls Yes   Date of Last Fall   (related to admit, found down)   Cognition    Cognition / Consciousness X   Speech/ Communication Delayed Responses  (soft spoken)   Orientation Level Oriented x 4   Level of Consciousness Responds to voice   Ability To Follow Commands 1 Step   Attention Impaired   Initiation Impaired   Comments pleasant and cooperative, very delayed   Active ROM Upper Body   Comments pt with difficulty bringing BUE to mouth, R worse than L. BUE raise just above shoulder height in fowlers   Strength Upper Body   Comments upon testing, BUE 4/5, strong , able to sustain resistance with push/pull. However, observed BUE weakness with functional mobility and while eating   Sensation Upper Body   Upper Extremity Sensation  Not Tested   Active ROM Lower Body    Active ROM Lower Body  WDL   Comments grossly functional   Strength Lower Body   Lower Body Strength  X   Comments BLE grossly 3+/5, no buckling in standing   Sensation Lower Body   Lower Extremity Sensation   Not Tested   Coordination Upper Body   Coordination X   Comments impaired finger to nose (slowed, R worse than L, dysmetria), finger opposition (slowed), and SAM   Coordination Lower Body    Coordination Lower Body  Not Tested   Vision   Vision Comments reporting able to see out of L eye with R eye covered, noted impaired tracking, unable to state objects or # of fingers therapist holding up. R eye appears WDL   Balance Assessment   Sitting Balance (Static) Fair -   Sitting Balance (Dynamic) Poor +   Standing Balance (Static) Poor -   Standing Balance (Dynamic) Trace +   Weight Shift Sitting Poor   Weight Shift Standing Poor   Comments w/ FWW   Bed Mobility    Supine  to Sit Moderate Assist   Sit to Supine Moderate Assist   Scooting Moderate Assist   Rolling Moderate Assist to Lt.   Comments HOB raised, use of rail, significant increase time and effort   Gait Analysis   Gait Level Of Assist Unable to Participate   Comments attempted, unable to take side steps despite maxA to weight shift and max verbal/tactile cues for sequencing   Functional Mobility   Sit to Stand Moderate Assist   Bed, Chair, Wheelchair Transfer Unable to Participate  (no recliner chair available, pt not safe for regular chair, RN in agreement, deferred transfer)   Mobility supine, EOB, stand, attempted side step, EOB, supine   6 Clicks Assessment - How much HELP from from another person do you currently need... (If the patient hasn't done an activity recently, how much help from another person do you think he/she would need if he/she tried?)   Turning from your back to your side while in a flat bed without using bedrails? 2   Moving from lying on your back to sitting on the side of a flat bed without using bedrails? 2   Moving to and from a bed to a chair (including a wheelchair)? 2   Standing up from a chair using your arms (e.g., wheelchair, or bedside chair)? 2   Walking in hospital room? 1   Climbing 3-5 steps with a railing? 1   6 clicks Mobility Score 10   Activity Tolerance   Sitting Edge of Bed 3 min   Standing 3 min   Comments limited by cognition, weakness, balance   Short Term Goals    Short Term Goal # 1 Pt will perform supine <> sit with SPV in 6 visits to get in/out of bed   Short Term Goal # 2 Pt will perform stand step transfers with FWW and Prudencio in 6 visits to get in/out of chair   Short Term Goal # 3 Pt will ambulate 50ft with FWW and Prudencio in 6 visits to access home environment   Short Term Goal # 4 Pt will ascend/descend 20 steps with unilateral UE support and SPV in 6 visits to safely enter/exit home  (if pt truly has 20 steps to enter, unreliable historian)   Education Group   Education  Provided Role of Physical Therapist   Role of Physical Therapist Patient Response Patient;Acceptance;Explanation;Verbal Demonstration;Reinforcement Needed   Additional Comments Pt receptive to self management and compensatory strategies with mobility, will require re-inforcement   Physical Therapy Initial Treatment Plan    Treatment Plan  Bed Mobility;Equipment;Family / Caregiver Training;Gait Training;Manual Therapy;Neuro Re-Education / Balance;Self Care / Home Evaluation;Stair Training;Therapeutic Activities;Therapeutic Exercise   Treatment Frequency 4 Times per Week   Duration Until Therapy Goals Met   Problem List    Problems Impaired Transfers;Impaired Ambulation;Functional Strength Deficit;Impaired Balance;Decreased Activity Tolerance;Safety Awareness Deficits / Cognition;Impaired Bed Mobility;Impaired Coordination;Impaired Vision;Motor Planning / Sequencing   Anticipated Discharge Equipment and Recommendations   DC Equipment Recommendations Unable to determine at this time   Discharge Recommendations Recommend post-acute placement for additional physical therapy services prior to discharge home  (PM&R consult)   Interdisciplinary Plan of Care Collaboration   IDT Collaboration with  Nursing   Patient Position at End of Therapy In Bed;Bed Alarm On;Call Light within Reach;Phone within Reach;Tray Table within Reach   Collaboration Comments RN updated   Session Information   Date / Session Number  11/3- 1 (1/4, 11/9)

## 2024-11-03 NOTE — PROGRESS NOTES
"Hospital Medicine Daily Progress Note    Date of Service  11/3/2024    Chief Complaint  Jethro Cook is a 49 y.o. male admitted 10/30/2024 with alcohol withdrawal, encephalopathy    Hospital Course  49 y.o. male who presented 10/30/2024 with a history of ETOH abuse, HLD, tobacco use, CAD.  Found down by family last seen 24hrs previously.  Intubated by EMS.  BAL >400, , LA 7.9, , core temp 27.5c.  extubated 10/29     On my examination the patient states he feels fine.  He does not recall what happened prior to his presentation here, just remembers waking up here in the hospital.  He does admit to drinking \"a lot\" but is reluctant to specify.  He thinks that he was feeling okay prior to being found down, he does not recall feeling of fever or headache or any other symptoms.  At present he does feel \"a little jittery\".    Interval Problem Update  11/1 patient is resting in bed, patient is new to me today, patient having diarrhea, have ordered to check for C. difficile infection, I have restarted blood pressure medications, also his gabapentin, continue CIWA protocol, monitoring electrolytes, patient is able to move his extremities, patient general very weak, denies any shortness of breath, continue close monitoring, continue telemetry, discussed with bedside nurse charge nurse  pharmacist, follow-up blood work in a.m.  11/2 patient is resting in bed, still having diarrhea, C. difficile came back positive, started on oral vancomycin, started on IV Lasix, patient mental status is improved, no focal weakness, continue tolerating diet, replace electrolytes as needed, PT OT pending, discussed with bedside nurse charge nurse  pharmacist, I have ordered blood work for in a.m., monitoring I's and O's.  11/3 patient is resting in bed, no fever or chills no nausea or vomiting, diarrrhea improved, follows commands, replacing K today due to hypokalemia, will stop iv lasix today, continue " monitoring, discussed with bedside nurse charge nurse . F/u bmp in am.     I have discussed this patient's plan of care and discharge plan at IDT rounds today with Case Management, Nursing, Nursing leadership, and other members of the IDT team.    Consultants/Specialty  Critical care    Code Status  Full Code    Disposition  The patient is not medically cleared for discharge to home or a post-acute facility.      I have placed the appropriate orders for post-discharge needs.    Review of Systems  Review of Systems   Unable to perform ROS: Mental acuity   Constitutional:  Negative for chills and fever.   Eyes:  Negative for blurred vision and double vision.   Respiratory:  Negative for cough, hemoptysis and wheezing.    Cardiovascular:  Negative for chest pain, palpitations, claudication, leg swelling and PND.   Gastrointestinal:  Positive for diarrhea. Negative for heartburn, nausea and vomiting.   Genitourinary:  Negative for hematuria and urgency.   Musculoskeletal:  Negative for back pain and myalgias.   Skin:  Negative for rash.   Neurological:  Negative for dizziness and headaches.   Endo/Heme/Allergies:  Does not bruise/bleed easily.   Psychiatric/Behavioral:  Negative for depression.         Physical Exam  Temp:  [36.5 °C (97.7 °F)-36.9 °C (98.4 °F)] 36.5 °C (97.7 °F)  Pulse:  [84-97] 84  Resp:  [18-20] 20  BP: ()/() 92/62  SpO2:  [92 %-97 %] 93 %    Physical Exam  Vitals and nursing note reviewed.   Constitutional:       Appearance: He is ill-appearing.   HENT:      Head: Normocephalic.   Eyes:      General:         Right eye: No discharge.         Left eye: No discharge.      Conjunctiva/sclera: Conjunctivae normal.   Cardiovascular:      Rate and Rhythm: Regular rhythm. Tachycardia present.      Pulses: Normal pulses.      Heart sounds: Normal heart sounds.   Pulmonary:      Effort: Pulmonary effort is normal.      Breath sounds: Normal breath sounds.   Abdominal:      General:  Bowel sounds are normal. There is no distension.      Tenderness: There is no abdominal tenderness.   Musculoskeletal:         General: Normal range of motion.      Cervical back: Normal range of motion and neck supple.      Right lower leg: No edema.      Left lower leg: No edema.   Skin:     General: Skin is warm.      Capillary Refill: Capillary refill takes less than 2 seconds.   Neurological:      General: No focal deficit present.      Mental Status: He is alert.      Comments: Partially oriented, able to move all his extremities.   Psychiatric:         Mood and Affect: Mood normal.         Fluids    Intake/Output Summary (Last 24 hours) at 11/3/2024 1213  Last data filed at 11/3/2024 0900  Gross per 24 hour   Intake 849.59 ml   Output 3700 ml   Net -2850.41 ml        Laboratory  Recent Labs     11/02/24  0332 11/03/24  0026 11/03/24  0535   WBC 6.1 7.8 8.0   RBC 3.25* 3.35* 3.38*   HEMOGLOBIN 11.2* 11.5* 11.2*   HEMATOCRIT 33.3* 34.3* 34.1*   .5* 102.4* 100.9*   MCH 34.5* 34.3* 33.1*   MCHC 33.6 33.5 32.8   RDW 56.7* 56.3* 55.7*   PLATELETCT 49* 48* 49*   MPV 12.0 10.6 10.9     Recent Labs     11/01/24  1036 11/02/24  0332 11/03/24  0026   SODIUM 139 137 138   POTASSIUM 3.4* 3.9 3.2*   CHLORIDE 99 99 99   CO2 26 25 25   GLUCOSE 80 79 93   BUN 10 12 16   CREATININE 0.71 0.71 0.72   CALCIUM 8.9 8.9 9.0               Recent Labs     11/02/24  0332   TRIGLYCERIDE 130       Imaging  DX-CHEST-PORTABLE (1 VIEW)   Final Result   Impression:      1. Shallow breath post extubation.      2. Mild infiltrates. No effusions.                     DX-CHEST-PORTABLE (1 VIEW)   Final Result      Bilateral infiltrates, left worse than right.      EC-ECHOCARDIOGRAM COMPLETE W/ CONT   Final Result      DX-CHEST-LIMITED (1 VIEW)   Final Result         1. Interval placement of a right internal jugular central venous access catheter terminating over the superior vena cava. No postprocedure visible pneumothorax.   2. Adjustment  of the endotracheal and nasogastric tubes, now appropriately positioned.   3. Resolution of gastric distention with air.      CT-LSPINE W/O PLUS RECONS   Final Result      1. No acute osseous abnormality involving the lumbar spine.   2. L5-S1 degenerative disc disease and facet arthrosis.   3. For imaging findings regarding the remainder of the abdomen and pelvis, please see CT of the chest, abdomen and pelvis performed the same day.      CT-TSPINE W/O PLUS RECONS   Final Result      1. No acute posttraumatic imaging findings in the thoracic spine.   2. Appropriate positions of the endotracheal and nasogastric tubes.   3. For the imaging findings in the remainder of the chest and abdomen, please see CT chest, abdomen and pelvis performed the same day.      CT-CHEST,ABDOMEN,PELVIS WITH   Final Result      1.  BILATERAL lower lobe atelectasis. Superimposed pneumonia not excluded   2.  Edema in the retroperitoneum could indicate pancreatitis, gastritis or duodenitis   3.  Hepatic steatosis   4.  Trace ascites   5.  LEFT inguinal hernia contains fat   6.  Thyroid gland is enlarged and heterogenous, recommend correlation with laboratory studies and ultrasound when clinically appropriate      CT-CSPINE WITHOUT PLUS RECONS   Final Result      1.  No acute abnormality identified.   2.  Multilevel multifactorial degenerative changes   3.  Heterogeneous enlarged thyroid, recommend correlation with laboratory studies and ultrasound when clinically appropriate      CT-MAXILLOFACIAL W/O PLUS RECONS   Final Result      1. No acute facial bone fracture.   2. The lens of the left globe is displaced posteriorly into the vitreous humor, best seen on series 9 image 108. It was appropriately positioned on the prior exam performed on 1/28/2024.   3. Intracranial atherosclerotic calcifications.      CT-HEAD W/O   Final Result   Addendum (preliminary) 1 of 1   LEFT globe injury with displaced LEFT lung is better seen on the     maxillofacial CT performed at the same time      Final      No acute intracranial abnormality.            US-ABDOMEN F.A.S.T. LTD (FOR ED USE ONLY)   Final Result      No free fluid seen in all 4 quadrants.      Negative FAST scan.            DX-CHEST-LIMITED (1 VIEW)   Final Result         1. The endotracheal tube terminates at the lorenza; withdrawal 4 cm is recommended.   2. Advancement of the nasogastric tube 20 cm is recommended. There is gastric distention with air.   3. Low lung volumes.      DX-PELVIS-1 OR 2 VIEWS   Final Result      No           Assessment/Plan  * Acute encephalopathy- (present on admission)  Assessment & Plan  Acute alcohol intoxication with blood level greater than 400, and hypothermia.  Now that these issues have been addressed, the patient is awake alert and interactive  Partially oriented  Continue close monitoring  No focal weakness  Continue monitoring, patient appears to be more oriented no focal weakness    Back to  baseline.     Thrombocytopenia (HCC)  Assessment & Plan  Hold the Lovenox  Platelet count 49 today. Stable.     C. difficile diarrhea  Assessment & Plan  Started on oral vancomycin  Continue isolation per protocol    Continue po vanco  Diarrhea improved.     Alcohol withdrawal syndrome without complication (HCC)  Assessment & Plan  Patient is starting to exhibit signs of withdrawal  Start scheduled Librium for seizure prophylaxis  Start benzodiazepine withdrawal program  Seizure precautions  Close monitoring of mental status and airway  Improved.     Macrocytic anemia- (present on admission)  Assessment & Plan  Likely related to the patient's alcohol intake  IV thiamine and p.o. folate  Dietary consultation to improve nutritional status    Hyponatremia- (present on admission)  Assessment & Plan  Insetting of glucose greater than 600.  Corrected into the normal range keeping glucose in mind  Na 138    Electrolyte abnormality- (present on admission)  Assessment &  Plan  Following and replacing daily magnesium, phosphorus, potassium.  Monitoring daily electrolytes    Lactic acidosis- (present on admission)  Assessment & Plan  Alcohol versus malnutrition versus hypovolemic versus seizure  Resolved    Hypothermia- (present on admission)  Assessment & Plan  Patient was hypothermic after being altered and found down  Now s/p rewarming    Shock (HCC)- (present on admission)  Assessment & Plan  Patient came in hypothermic  Has responded to fluid resuscitation and rewarming     Blood pressure improved, will restart his blood pressure medications with close monitoring    Hyperglycemia- (present on admission)  Assessment & Plan  Glycohemoglobin 4.8  Question stress response  Continue to monitor    Acute alcoholic intoxication with complication (HCC)- (present on admission)  Assessment & Plan  Blood level greater than 400  Encourage alcohol cessation  IV thiamine load  P.o. folate and MVI  Starting to withdrawal  Continue monitoring CIWA protocol    improved      Acute respiratory failure with hypoxia (HCC)- (present on admission)  Assessment & Plan  Patient intubated for airway protection in setting of altered mentation.  Now that the patient is thinking clearly, and with a normal level of consciousness, he is protecting his airway and oxygenating.  Continue O2 and RT protocols  Mobilize  Encourage smoking cessation    Continue weaning off oxygen  Start on IV Lasix  Continue monitoring I's and O's    Continue weaning off o2.     Primary hypertension- (present on admission)  Assessment & Plan  As outpatient was maintained on losartan 100 mg daily, Coreg 6.25 mg twice daily, and amlodipine 10 mg daily  Currently going through withdrawals so titration of his chronic outpatient medications is best left to a later time when he is no longer in DTs    Improved  Continue monitoring  Restarted losartan 50 mg daily    Transaminitis- (present on admission)  Assessment & Plan  Pattern consistent  with alcohol which fits with the patient's history  CT imaging shows hepatic steatosis, no other pathology seen  LFTs are trending down  Encourage alcohol cessation  Continue to monitor, slowly trending down    NSTEMI (non-ST elevated myocardial infarction) (HCC)- (present on admission)  Assessment & Plan  Insetting of acute hypothermia.  EKG was nonischemic  Echo was negative for wall motion abnormalities         VTE prophylaxis: Lovenox, monitoring platelet count    I have performed a physical exam and reviewed and updated ROS and Plan today (11/3/2024). In review of yesterday's note (11/2/2024), there are no changes except as documented above.

## 2024-11-04 LAB
ANION GAP SERPL CALC-SCNC: 13 MMOL/L (ref 7–16)
BACTERIA BLD CULT: NORMAL
BACTERIA BLD CULT: NORMAL
BASOPHILS # BLD AUTO: 1.2 % (ref 0–1.8)
BASOPHILS # BLD: 0.07 K/UL (ref 0–0.12)
BUN SERPL-MCNC: 21 MG/DL (ref 8–22)
CALCIUM SERPL-MCNC: 8.7 MG/DL (ref 8.5–10.5)
CHLORIDE SERPL-SCNC: 100 MMOL/L (ref 96–112)
CO2 SERPL-SCNC: 25 MMOL/L (ref 20–33)
CREAT SERPL-MCNC: 0.91 MG/DL (ref 0.5–1.4)
EOSINOPHIL # BLD AUTO: 0.31 K/UL (ref 0–0.51)
EOSINOPHIL NFR BLD: 5.4 % (ref 0–6.9)
ERYTHROCYTE [DISTWIDTH] IN BLOOD BY AUTOMATED COUNT: 58.9 FL (ref 35.9–50)
GFR SERPLBLD CREATININE-BSD FMLA CKD-EPI: 103 ML/MIN/1.73 M 2
GLUCOSE BLD STRIP.AUTO-MCNC: 102 MG/DL (ref 65–99)
GLUCOSE SERPL-MCNC: 116 MG/DL (ref 65–99)
HCT VFR BLD AUTO: 33.7 % (ref 42–52)
HGB BLD-MCNC: 11 G/DL (ref 14–18)
IMM GRANULOCYTES # BLD AUTO: 0.06 K/UL (ref 0–0.11)
IMM GRANULOCYTES NFR BLD AUTO: 1.1 % (ref 0–0.9)
LYMPHOCYTES # BLD AUTO: 1.01 K/UL (ref 1–4.8)
LYMPHOCYTES NFR BLD: 17.7 % (ref 22–41)
MAGNESIUM SERPL-MCNC: 1.7 MG/DL (ref 1.5–2.5)
MCH RBC QN AUTO: 34.6 PG (ref 27–33)
MCHC RBC AUTO-ENTMCNC: 32.6 G/DL (ref 32.3–36.5)
MCV RBC AUTO: 106 FL (ref 81.4–97.8)
MONOCYTES # BLD AUTO: 0.54 K/UL (ref 0–0.85)
MONOCYTES NFR BLD AUTO: 9.5 % (ref 0–13.4)
NEUTROPHILS # BLD AUTO: 3.72 K/UL (ref 1.82–7.42)
NEUTROPHILS NFR BLD: 65.1 % (ref 44–72)
NRBC # BLD AUTO: 0 K/UL
NRBC BLD-RTO: 0 /100 WBC (ref 0–0.2)
PLATELET # BLD AUTO: 65 K/UL (ref 164–446)
PLATELETS.RETICULATED NFR BLD AUTO: 7.6 % (ref 0.6–13.1)
PMV BLD AUTO: 11.3 FL (ref 9–12.9)
POTASSIUM SERPL-SCNC: 3.6 MMOL/L (ref 3.6–5.5)
RBC # BLD AUTO: 3.18 M/UL (ref 4.7–6.1)
SIGNIFICANT IND 70042: NORMAL
SIGNIFICANT IND 70042: NORMAL
SITE SITE: NORMAL
SITE SITE: NORMAL
SODIUM SERPL-SCNC: 138 MMOL/L (ref 135–145)
SOURCE SOURCE: NORMAL
SOURCE SOURCE: NORMAL
WBC # BLD AUTO: 5.7 K/UL (ref 4.8–10.8)

## 2024-11-04 PROCEDURE — 85025 COMPLETE CBC W/AUTO DIFF WBC: CPT

## 2024-11-04 PROCEDURE — A9270 NON-COVERED ITEM OR SERVICE: HCPCS

## 2024-11-04 PROCEDURE — 770020 HCHG ROOM/CARE - TELE (206)

## 2024-11-04 PROCEDURE — A9270 NON-COVERED ITEM OR SERVICE: HCPCS | Performed by: HOSPITALIST

## 2024-11-04 PROCEDURE — 80048 BASIC METABOLIC PNL TOTAL CA: CPT

## 2024-11-04 PROCEDURE — 700102 HCHG RX REV CODE 250 W/ 637 OVERRIDE(OP): Performed by: HOSPITALIST

## 2024-11-04 PROCEDURE — 83735 ASSAY OF MAGNESIUM: CPT

## 2024-11-04 PROCEDURE — 36415 COLL VENOUS BLD VENIPUNCTURE: CPT

## 2024-11-04 PROCEDURE — 99406 BEHAV CHNG SMOKING 3-10 MIN: CPT | Performed by: PHYSICAL MEDICINE & REHABILITATION

## 2024-11-04 PROCEDURE — 82962 GLUCOSE BLOOD TEST: CPT

## 2024-11-04 PROCEDURE — 700101 HCHG RX REV CODE 250: Performed by: HOSPITALIST

## 2024-11-04 PROCEDURE — 97166 OT EVAL MOD COMPLEX 45 MIN: CPT

## 2024-11-04 PROCEDURE — 99232 SBSQ HOSP IP/OBS MODERATE 35: CPT | Performed by: HOSPITALIST

## 2024-11-04 PROCEDURE — 700111 HCHG RX REV CODE 636 W/ 250 OVERRIDE (IP): Performed by: HOSPITALIST

## 2024-11-04 PROCEDURE — 700102 HCHG RX REV CODE 250 W/ 637 OVERRIDE(OP)

## 2024-11-04 PROCEDURE — 85055 RETICULATED PLATELET ASSAY: CPT

## 2024-11-04 PROCEDURE — 99223 1ST HOSP IP/OBS HIGH 75: CPT | Mod: 25 | Performed by: PHYSICAL MEDICINE & REHABILITATION

## 2024-11-04 RX ORDER — CARBOXYMETHYLCELLULOSE SODIUM 5 MG/ML
1 SOLUTION/ DROPS OPHTHALMIC PRN
Status: DISCONTINUED | OUTPATIENT
Start: 2024-11-04 | End: 2024-11-07 | Stop reason: HOSPADM

## 2024-11-04 RX ORDER — MAGNESIUM SULFATE HEPTAHYDRATE 40 MG/ML
2 INJECTION, SOLUTION INTRAVENOUS ONCE
Status: COMPLETED | OUTPATIENT
Start: 2024-11-04 | End: 2024-11-04

## 2024-11-04 RX ADMIN — CARVEDILOL 6.25 MG: 6.25 TABLET, FILM COATED ORAL at 07:37

## 2024-11-04 RX ADMIN — CARBOXYMETHYLCELLULOSE SODIUM 1 DROP: 5 SOLUTION/ DROPS OPHTHALMIC at 06:13

## 2024-11-04 RX ADMIN — AMLODIPINE BESYLATE 10 MG: 10 TABLET ORAL at 06:11

## 2024-11-04 RX ADMIN — CARVEDILOL 6.25 MG: 6.25 TABLET, FILM COATED ORAL at 16:00

## 2024-11-04 RX ADMIN — Medication 125 MG: at 23:36

## 2024-11-04 RX ADMIN — Medication 125 MG: at 12:36

## 2024-11-04 RX ADMIN — Medication 125 MG: at 16:00

## 2024-11-04 RX ADMIN — MAGNESIUM SULFATE HEPTAHYDRATE 2 G: 2 INJECTION, SOLUTION INTRAVENOUS at 12:39

## 2024-11-04 RX ADMIN — Medication 125 MG: at 07:37

## 2024-11-04 RX ADMIN — LOSARTAN POTASSIUM 50 MG: 50 TABLET, FILM COATED ORAL at 06:11

## 2024-11-04 RX ADMIN — NICOTINE TRANSDERMAL SYSTEM 21 MG: 21 PATCH, EXTENDED RELEASE TRANSDERMAL at 06:11

## 2024-11-04 ASSESSMENT — ENCOUNTER SYMPTOMS
WHEEZING: 0
CHILLS: 0
DOUBLE VISION: 0
BACK PAIN: 0
BRUISES/BLEEDS EASILY: 0
NAUSEA: 0
COUGH: 0
PALPITATIONS: 0
MYALGIAS: 0
FEVER: 0
DIZZINESS: 0
HEARTBURN: 0
VOMITING: 0
CLAUDICATION: 0
HEMOPTYSIS: 0
BLURRED VISION: 0
PND: 0
DIARRHEA: 1
DEPRESSION: 0
HEADACHES: 0

## 2024-11-04 ASSESSMENT — FIBROSIS 4 INDEX: FIB4 SCORE: 14.91

## 2024-11-04 ASSESSMENT — COGNITIVE AND FUNCTIONAL STATUS - GENERAL
TOILETING: A LITTLE
PERSONAL GROOMING: A LITTLE
SUGGESTED CMS G CODE MODIFIER DAILY ACTIVITY: CK
HELP NEEDED FOR BATHING: A LITTLE
DRESSING REGULAR LOWER BODY CLOTHING: A LITTLE
DAILY ACTIVITIY SCORE: 19
DRESSING REGULAR UPPER BODY CLOTHING: A LITTLE

## 2024-11-04 ASSESSMENT — PAIN DESCRIPTION - PAIN TYPE: TYPE: ACUTE PAIN

## 2024-11-04 ASSESSMENT — ACTIVITIES OF DAILY LIVING (ADL): TOILETING: INDEPENDENT

## 2024-11-04 NOTE — CARE PLAN
Problem: Knowledge Deficit - Standard  Goal: Patient and family/care givers will demonstrate understanding of plan of care, disease process/condition, diagnostic tests and medications  Description: Target End Date:  1-3 days or as soon as patient condition allows    Document in Patient Education    1.  Patient and family/caregiver oriented to unit, equipment, visitation policy and means for communicating concern  2.  Complete/review Learning Assessment  3.  Assess knowledge level of disease process/condition, treatment plan, diagnostic tests and medications  4.  Explain disease process/condition, treatment plan, diagnostic tests and medications  Outcome: Progressing     Problem: Pain - Standard  Goal: Alleviation of pain or a reduction in pain to the patient’s comfort goal  Description: Target End Date:  Prior to discharge or change in level of care    Document on Vitals flowsheet    1.  Document pain using the appropriate pain scale per order or unit policy  2.  Educate and implement non-pharmacologic comfort measures (i.e. relaxation, distraction, massage, cold/heat therapy, etc.)  3.  Pain management medications as ordered  4.  Reassess pain after pain med administration per policy  5.  If opiods administered assess patient's response to pain medication is appropriate per POSS sedation scale  6.  Follow pain management plan developed in collaboration with patient and interdisciplinary team (including palliative care or pain specialists if applicable)  Outcome: Progressing   The patient is Watcher - Medium risk of patient condition declining or worsening    Shift Goals  Clinical Goals: q4 neuro checks, monitor VS, skin protection  Patient Goals: rest and eat  Family Goals: elise    Progress made toward(s) clinical / shift goals:  Pt updated on POC. All questions answered at this time.    Patient is not progressing towards the following goals:

## 2024-11-04 NOTE — DOCUMENTATION QUERY
"                                                                         [facility]                                                                       Query Response Note      PATIENT:               PEREZ MEDEL  ACCT #:                  [id number]  MRN:                     [id number]  :                      1975  ADMIT DATE:       10/30/2024 10:43 AM  DISCH DATE:          RESPONDING  PROVIDER #:        [id number]           QUERY TEXT:    The diagnosis of sepsis has been documented in the Medical Record but has not been documented in the last few days notes, please clarify further.    Please clarify the status of sepsis by providing SIRS criteria and sepsis-related organ dysfunction with systemic infection     Sepsis - real or suspected infection plus 2 or more SIRS criteria + organ dysfunction related to sepsis    Temp <96.8 or >101  HR >90  RR >20  WBC <4,000 or > 12,000 or >10% bandemia    The patient's Clinical Indicators include:  49 year old male admitted for acute alcohol intoxication, hypothermia and hyperglycemia.     [place]onte[place] \"Shock (HCC)- (present on admission)  Patient came in hypothermic\"    10/31 Albrektson \"Shock (HCC)- (present on admission)  Suspect due to sepsis/aspiration pna vs CAP  S/p sepsis bolus of 2.2 liters with elevated lactic acid and respiratory failure\"  \"hypotensive upon arrival requiring a sepsis bolus of 2.2 liters of IVF as well as broad spectrum antibiotics with vasopressor therapies.\"    10/30 Shilo \"Shock (HCC)- (present on admission)  Suspect due to sepsis/aspiration pna vs CAP\"  \"hypotensive upon arrival requiring a sepsis bolus\"    Risk factors: encephalopathy, acidosis, possible aspiration, withdrawal. c-diff +  Treatment: labs, ABX, IVF, CT, ECHO, CXR, vent, pressors, Rally bag, CIWA    If you have any questions, please contact:  Valentina Mariscal RN CDI [facility]  [email or web address]  Valentina Mariscal [address] [employer]    Note: If you agree with a " diagnosis listed, please remember to include it in your concurrent daily documentation and onto the Discharge Summary.  Options provided:   -- Sepsis and septic shock ruled out after further study, treated for hypovolemic shock, (provide SIRS criteria plus sepsis-related organ dysfunction)   -- Sepsis and septic shock ruled out after further study, treated for hypovolemic shock and metabolic encephalopathy   -- Septic shock with organ dysfunction is a valid diagnosis this admission (clarify systemic infection with organ dysfunction)   -- Other explanation, Please specify      Query created by: Valentina Mariscal on 11/4/2024 2:33 PM    RESPONSE TEXT:    Septic shock with organ dysfunction is a valid diagnosis this admission (clarify systemic infection with organ dysfunction)          Electronically signed by:  ALEC DE JESUS MD 11/4/2024 3:05 PM

## 2024-11-04 NOTE — DISCHARGE PLANNING
Care Transition Team Discharge Planning    Anticipated Discharge Information  Discharge Disposition: D/T to SNF with Medicare cert in anticipation of skilled care (03)    Discharge Plan:  PASRR: [id number]

## 2024-11-04 NOTE — DISCHARGE PLANNING
[facility] Transitional Care Coordination    Physiatry consult complete.  Dr. Watt recommending -     Disposition recommendations:  -Recommend SNF placement.  Patient does not want to go home soon, wants to spend several more weeks recovering from his alcohol use disorder.  He also will benefit from prolonged therapy.  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  Volate update to T8 ENOC Carvalho.

## 2024-11-04 NOTE — CARE PLAN
The patient is Stable - Low risk of patient condition declining or worsening    Shift Goals   Clinical Goals: q4 neuro checks, monitor VS, skin protection  Patient Goals: rest and eat  Family Goals: elise    Progress made toward(s) clinical / shift goals:    Problem: Pain - Standard  Goal: Alleviation of pain or a reduction in pain to the patient’s comfort goal  Outcome: Progressing  Note: Assess and monitor for pain. Provide pharmacological and non-pharmacological interventions as appropriate. Re-evaluate and continue to monitor.        Problem: Skin Integrity  Goal: Skin integrity is maintained or improved  Outcome: Progressing  Note: Assess skin and monitor for skin breakdown. Alleviate pressure to bony prominences and provide assistance with turning, repositioning, ROM and mobility as appropriate. Q2 turns with wedges throughout the night. Continue to monitor.       Problem: Fall Risk  Goal: Patient will remain free from falls  Outcome: Progressing  Note: Treaded socks and bed/strip alarm on, tele sitter and side rails up x 3. Call light within reach. Pt educated to call for assistance. Reinforce as needed. Continue to monitor.          Patient is not progressing towards the following goals:

## 2024-11-04 NOTE — PROGRESS NOTES
"Hospital Medicine Daily Progress Note    Date of Service  11/4/2024    Chief Complaint  Jethro Cook is a 49 y.o. male admitted 10/30/2024 with alcohol withdrawal, encephalopathy    Hospital Course  49 y.o. male who presented 10/30/2024 with a history of ETOH abuse, HLD, tobacco use, CAD.  Found down by family last seen 24hrs previously.  Intubated by EMS.  BAL >400, , LA 7.9, , core temp 27.5c.  extubated 10/29     On my examination the patient states he feels fine.  He does not recall what happened prior to his presentation here, just remembers waking up here in the hospital.  He does admit to drinking \"a lot\" but is reluctant to specify.  He thinks that he was feeling okay prior to being found down, he does not recall feeling of fever or headache or any other symptoms.  At present he does feel \"a little jittery\".    Interval Problem Update  11/1 patient is resting in bed, patient is new to me today, patient having diarrhea, have ordered to check for C. difficile infection, I have restarted blood pressure medications, also his gabapentin, continue CIWA protocol, monitoring electrolytes, patient is able to move his extremities, patient general very weak, denies any shortness of breath, continue close monitoring, continue telemetry, discussed with bedside nurse charge nurse  pharmacist, follow-up blood work in a.m.  11/2 patient is resting in bed, still having diarrhea, C. difficile came back positive, started on oral vancomycin, started on IV Lasix, patient mental status is improved, no focal weakness, continue tolerating diet, replace electrolytes as needed, PT OT pending, discussed with bedside nurse charge nurse  pharmacist, I have ordered blood work for in a.m., monitoring I's and O's.  11/3 patient is resting in bed, no fever or chills no nausea or vomiting, diarrrhea improved, follows commands, replacing K today due to hypokalemia, will stop iv lasix today, continue " monitoring, discussed with bedside nurse charge nurse . F/u bmp in am.   11/4 patient is resting bed, he is in no distress no shortness of breath, he is tolerating diet, no more diarrhea, denies any abdominal pain, continue close monitoring, discussed bedside nurse charge nurse  pharmacist all question answered    I have discussed this patient's plan of care and discharge plan at IDT rounds today with Case Management, Nursing, Nursing leadership, and other members of the IDT team.    Consultants/Specialty  Critical care    Code Status  Full Code    Disposition  The patient is medically cleared for discharge to home or a post-acute facility.  Anticipate discharge to: skilled nursing facility    I have placed the appropriate orders for post-discharge needs.    Review of Systems  Review of Systems   Unable to perform ROS: Mental acuity   Constitutional:  Negative for chills and fever.   Eyes:  Negative for blurred vision and double vision.   Respiratory:  Negative for cough, hemoptysis and wheezing.    Cardiovascular:  Negative for chest pain, palpitations, claudication, leg swelling and PND.   Gastrointestinal:  Positive for diarrhea. Negative for heartburn, nausea and vomiting.   Genitourinary:  Negative for hematuria and urgency.   Musculoskeletal:  Negative for back pain and myalgias.   Skin:  Negative for rash.   Neurological:  Negative for dizziness and headaches.   Endo/Heme/Allergies:  Does not bruise/bleed easily.   Psychiatric/Behavioral:  Negative for depression.         Physical Exam  Temp:  [36.4 °C (97.5 °F)-36.8 °C (98.2 °F)] 36.4 °C (97.5 °F)  Pulse:  [79-90] 80  Resp:  [16-22] 20  BP: (111-132)/(70-85) 117/70  SpO2:  [92 %-99 %] 92 %    Physical Exam  Vitals and nursing note reviewed.   Constitutional:       Appearance: He is ill-appearing.   HENT:      Head: Normocephalic.   Eyes:      General:         Right eye: No discharge.         Left eye: No discharge.       Conjunctiva/sclera: Conjunctivae normal.   Cardiovascular:      Rate and Rhythm: Regular rhythm. Tachycardia present.      Pulses: Normal pulses.      Heart sounds: Normal heart sounds.   Pulmonary:      Effort: Pulmonary effort is normal.      Breath sounds: Normal breath sounds.   Abdominal:      General: Bowel sounds are normal. There is no distension.      Tenderness: There is no guarding.   Musculoskeletal:         General: Normal range of motion.      Cervical back: Normal range of motion and neck supple.      Right lower leg: No edema.      Left lower leg: No edema.   Skin:     General: Skin is warm.      Capillary Refill: Capillary refill takes less than 2 seconds.   Neurological:      General: No focal deficit present.      Mental Status: He is alert.      Comments: Partially oriented, able to move all his extremities.   Psychiatric:         Mood and Affect: Mood normal.         Fluids    Intake/Output Summary (Last 24 hours) at 11/4/2024 1207  Last data filed at 11/4/2024 0354  Gross per 24 hour   Intake 1840 ml   Output 500 ml   Net 1340 ml        Laboratory  Recent Labs     11/03/24  0026 11/03/24  0535 11/04/24  0426   WBC 7.8 8.0 5.7   RBC 3.35* 3.38* 3.18*   HEMOGLOBIN 11.5* 11.2* 11.0*   HEMATOCRIT 34.3* 34.1* 33.7*   .4* 100.9* 106.0*   MCH 34.3* 33.1* 34.6*   MCHC 33.5 32.8 32.6   RDW 56.3* 55.7* 58.9*   PLATELETCT 48* 49* 65*   MPV 10.6 10.9 11.3     Recent Labs     11/02/24  0332 11/03/24  0026 11/04/24  0426   SODIUM 137 138 138   POTASSIUM 3.9 3.2* 3.6   CHLORIDE 99 99 100   CO2 25 25 25   GLUCOSE 79 93 116*   BUN 12 16 21   CREATININE 0.71 0.72 0.91   CALCIUM 8.9 9.0 8.7               Recent Labs     11/02/24  0332   TRIGLYCERIDE 130       Imaging  DX-CHEST-PORTABLE (1 VIEW)   Final Result   Impression:      1. Shallow breath post extubation.      2. Mild infiltrates. No effusions.                     DX-CHEST-PORTABLE (1 VIEW)   Final Result      Bilateral infiltrates, left worse than  right.      EC-ECHOCARDIOGRAM COMPLETE W/ CONT   Final Result      DX-CHEST-LIMITED (1 VIEW)   Final Result         1. Interval placement of a right internal jugular central venous access catheter terminating over the superior vena cava. No postprocedure visible pneumothorax.   2. Adjustment of the endotracheal and nasogastric tubes, now appropriately positioned.   3. Resolution of gastric distention with air.      CT-LSPINE W/O PLUS RECONS   Final Result      1. No acute osseous abnormality involving the lumbar spine.   2. L5-S1 degenerative disc disease and facet arthrosis.   3. For imaging findings regarding the remainder of the abdomen and pelvis, please see CT of the chest, abdomen and pelvis performed the same day.      CT-TSPINE W/O PLUS RECONS   Final Result      1. No acute posttraumatic imaging findings in the thoracic spine.   2. Appropriate positions of the endotracheal and nasogastric tubes.   3. For the imaging findings in the remainder of the chest and abdomen, please see CT chest, abdomen and pelvis performed the same day.      CT-CHEST,ABDOMEN,PELVIS WITH   Final Result      1.  BILATERAL lower lobe atelectasis. Superimposed pneumonia not excluded   2.  Edema in the retroperitoneum could indicate pancreatitis, gastritis or duodenitis   3.  Hepatic steatosis   4.  Trace ascites   5.  LEFT inguinal hernia contains fat   6.  Thyroid gland is enlarged and heterogenous, recommend correlation with laboratory studies and ultrasound when clinically appropriate      CT-CSPINE WITHOUT PLUS RECONS   Final Result      1.  No acute abnormality identified.   2.  Multilevel multifactorial degenerative changes   3.  Heterogeneous enlarged thyroid, recommend correlation with laboratory studies and ultrasound when clinically appropriate      CT-MAXILLOFACIAL W/O PLUS RECONS   Final Result      1. No acute facial bone fracture.   2. The lens of the left globe is displaced posteriorly into the vitreous humor, best seen  on series 9 image 108. It was appropriately positioned on the prior exam performed on 1/28/2024.   3. Intracranial atherosclerotic calcifications.      CT-HEAD W/O   Final Result   Addendum (preliminary) 1 of 1   LEFT globe injury with displaced LEFT lung is better seen on the    maxillofacial CT performed at the same time      Final      No acute intracranial abnormality.            US-ABDOMEN F.A.S.T. LTD (FOR ED USE ONLY)   Final Result      No free fluid seen in all 4 quadrants.      Negative FAST scan.            DX-CHEST-LIMITED (1 VIEW)   Final Result         1. The endotracheal tube terminates at the lorenza; withdrawal 4 cm is recommended.   2. Advancement of the nasogastric tube 20 cm is recommended. There is gastric distention with air.   3. Low lung volumes.      DX-PELVIS-1 OR 2 VIEWS   Final Result      No           Assessment/Plan  * Acute encephalopathy- (present on admission)  Assessment & Plan  Acute alcohol intoxication with blood level greater than 400, and hypothermia.  Now that these issues have been addressed, the patient is awake alert and interactive  Partially oriented  Continue close monitoring  No focal weakness  Continue monitoring, patient appears to be more oriented no focal weakness    Back to  baseline.     Thrombocytopenia (HCC)  Assessment & Plan  Hold the Lovenox  Platelet count 49 today. Stable.     C. difficile diarrhea  Assessment & Plan  Started on oral vancomycin  Continue isolation per protocol    Continue po vanco  Diarrhea improved.     Alcohol withdrawal syndrome without complication (HCC)  Assessment & Plan  Patient is starting to exhibit signs of withdrawal  Start scheduled Librium for seizure prophylaxis  Start benzodiazepine withdrawal program  Seizure precautions  Close monitoring of mental status and airway  Improved.     Macrocytic anemia- (present on admission)  Assessment & Plan  Likely related to the patient's alcohol intake  IV thiamine and p.o. folate  Dietary  consultation to improve nutritional status    Hyponatremia- (present on admission)  Assessment & Plan  Insetting of glucose greater than 600.  Corrected into the normal range keeping glucose in mind  Na 138    Electrolyte abnormality- (present on admission)  Assessment & Plan  Following and replacing daily magnesium, phosphorus, potassium.  Monitoring daily electrolytes    Lactic acidosis- (present on admission)  Assessment & Plan  Alcohol versus malnutrition versus hypovolemic versus seizure  Resolved    Hypothermia- (present on admission)  Assessment & Plan  Patient was hypothermic after being altered and found down  Now s/p rewarming    Shock (HCC)- (present on admission)  Assessment & Plan  Patient came in hypothermic  Has responded to fluid resuscitation and rewarming     Blood pressure improved, will restart his blood pressure medications with close monitoring    Hyperglycemia- (present on admission)  Assessment & Plan  Glycohemoglobin 4.8  Question stress response  Continue to monitor    Acute alcoholic intoxication with complication (HCC)- (present on admission)  Assessment & Plan  Blood level greater than 400  Encourage alcohol cessation  IV thiamine load  P.o. folate and MVI  Starting to withdrawal  Continue monitoring CIWA protocol    improved      Acute respiratory failure with hypoxia (HCC)- (present on admission)  Assessment & Plan  Patient intubated for airway protection in setting of altered mentation.  Now that the patient is thinking clearly, and with a normal level of consciousness, he is protecting his airway and oxygenating.  Continue O2 and RT protocols  Mobilize  Encourage smoking cessation    Continue weaning off oxygen  Start on IV Lasix  Continue monitoring I's and O's    Continue weaning off o2.     Primary hypertension- (present on admission)  Assessment & Plan  As outpatient was maintained on losartan 100 mg daily, Coreg 6.25 mg twice daily, and amlodipine 10 mg daily  Currently going  through withdrawals so titration of his chronic outpatient medications is best left to a later time when he is no longer in DTs    Improved  Continue monitoring  Restarted losartan 50 mg daily    Transaminitis- (present on admission)  Assessment & Plan  Pattern consistent with alcohol which fits with the patient's history  CT imaging shows hepatic steatosis, no other pathology seen  LFTs are trending down  Encourage alcohol cessation  Continue to monitor, slowly trending down    NSTEMI (non-ST elevated myocardial infarction) (HCC)- (present on admission)  Assessment & Plan  Insetting of acute hypothermia.  EKG was nonischemic  Echo was negative for wall motion abnormalities         VTE prophylaxis: Lovenox, monitoring platelet count    I have performed a physical exam and reviewed and updated ROS and Plan today (11/4/2024). In review of yesterday's note (11/3/2024), there are no changes except as documented above.      My total time spent caring for the patient on the day of the encounter was 38 minutes.   This does not include time spent on separately billable procedures/tests.

## 2024-11-04 NOTE — DISCHARGE PLANNING
Case Management Discharge Planning    Admission Date: 10/30/2024  GMLOS: 4.9  ALOS: 5    6-Clicks ADL Score: 6  6-Clicks Mobility Score: 10  PT and/or OT Eval ordered: Yes  PT/OT:Recommending post acute   Post-acute Referrals Ordered: Yes  Post-acute Choice Obtained: NA  Has referral(s) been sent to post-acute provider:  NA      Anticipated Discharge Dispo: Discharge Disposition: D/T to SNF with Medicare cert in anticipation of skilled care (03)    DME Needed: Pending hospital course     Action(s) Taken: LMSW conducted chart review and discussed pt in IDT rounds. Pt is MC for Post acute placement. Referrals and PASRR completed. PMR consult placed but PMR signed off. No current accepting SNF's. Per bed side RN daughter wants pt closer to [place]. Referral place to [facility] still marked as pending.     1533 LMSW received a call from pt's sister stating that if there was a possibility on sending pt to a SNF that is closer to her in [place], [place]. Due to pt selling his home in [place]. LMSW stated that we can attempt to send referrals out closer to [place] and informed her of the barriers of getting facilities to accept him and transportation would have to be self pay. Sister is agreeable and has a list of facilities she wants referrals to be sent to. Though sister is currently driving and will give LMSW a call back with the facilities she has chosen for CM can send referral out.     Escalations Completed: None    Medically Clear: Yes    Next Steps: F/U with any accepting facilities.     Barriers to Discharge: Pending Placement    Is the patient up for discharge tomorrow: Yes    Is transport arranged for discharge disposition: No

## 2024-11-04 NOTE — CONSULTS
"    Physical Medicine and Rehabilitation Consultation          Date of initial consultation: 11/4/2024  Consulting provider: Clem Dumont M.D.   Reason for consultation: assess for acute inpatient rehab appropriateness  LOS: 5 Day(s)    Chief complaint:     HPI: The patient is a 49 y.o. right hand dominant male with a past medical history of tobacco abuse, alcohol abuse, hyperlipidemia, CAD;  who presented on 10/30/2024 10:43 AM with altered mental status, found down by family.  Currently being treated for alcohol withdrawal and encephalopathy.  Patient's blood alcohol was 409 on admission.  Hospitalization has been significant for C. difficile diarrhea, started on vancomycin.  Acute respiratory failure requiring intubation, now on 1 L nasal cannula oxygen.    The patient currently reports moving and thinking very slowly.  Patient does not want to go home, he wants to go to \"rehab\" I believe he is referring to drug and alcohol rehab.  We also discussed the need for physical rehab which she recognizes the need for as well.  Patient wants to go for \"a long time\".  He lives alone in [place].  He has a son there but wants to get better on his own.    ROS  Pertinent positives are mentioned in the HPI, all others reviewed and are negative.    Social Hx:  1 SH  2 TAYLOR  With: Son    Tobacco: 1 pack every 3 days  Alcohol: 1 bottle of vodka a day      THERAPY:  Restrictions: Fall risk  PT: Functional mobility   11/3: Mod assist sit to stand, unable to participate in transfer    OT: ADLs  Pending    SLP:   Pending    IMAGING:  CXR 11/1/2024  1. Shallow breath post extubation.  2. Mild infiltrates. No effusions.    PROCEDURES:  None    PMH:  Past Medical History:   Diagnosis Date    CAD (coronary artery disease)     Hyperlipemia     Hypertension     Seizure (HCC)     Stroke (HCC)        PSH:  No past surgical history on file.    FHX:  Non-pertinent to today's issues    Medications:  Current Facility-Administered " "Medications   Medication Dose    carboxymethylcellulose (Refresh Tears) 0.5 % ophthalmic drops 1 Drop  1 Drop    losartan (Cozaar) tablet 50 mg  50 mg    vancomycin 50 mg/mL oral soln 125 mg  125 mg    nicotine (Nicoderm) 21 MG/24HR 21 mg  21 mg    And    nicotine polacrilex (Nicorette) 2 MG piece 2 mg  2 mg    hydrALAZINE (Apresoline) injection 10 mg  10 mg    amLODIPine (Norvasc) tablet 10 mg  10 mg    gabapentin (Neurontin) capsule 100 mg  100 mg    Pharmacy Consult Request  1 Each    carvedilol (Coreg) tablet 6.25 mg  6.25 mg    acetaminophen (Tylenol) tablet 650 mg  650 mg    ondansetron (Zofran ODT) dispertab 4 mg  4 mg    promethazine (Phenergan) tablet 12.5-25 mg  12.5-25 mg    cloNIDine (Catapres) tablet 0.1 mg  0.1 mg    Respiratory Therapy Consult      ipratropium-albuterol (DUONEB) nebulizer solution  3 mL    ondansetron (Zofran) syringe/vial injection 4 mg  4 mg    promethazine (Phenergan) suppository 12.5-25 mg  12.5-25 mg    prochlorperazine (Compazine) injection 5-10 mg  5-10 mg       Allergies:  Allergies   Allergen Reactions    Lisinopril Unspecified     Mood swings          Physical Exam:  Vitals: /70   Pulse 80   Temp 36.4 °C (97.5 °F) (Temporal)   Resp 20   Ht 1.753 m (5' 9.02\")   Wt 115 kg (254 lb 10.1 oz)   SpO2 92%   Gen: NAD  Head: NC/AT  Eyes/ Nose/ Mouth: PERRLA, moist mucous membranes  Cardio: RRR, good distal perfusion, warm extremities  Pulm: normal respiratory effort, no cyanosis   Abd: Soft NTND, negative borborygmi   Ext: No peripheral edema. No calf tenderness. No clubbing.    Mental status:  A&Ox4 (person, place, date, situation) answers questions appropriately follows commands  Speech: fluent, no aphasia or dysarthria      Motor:      Upper Extremity  Myotome R L   Shoulder flexion C5 5 5   Elbow flexion C5 5 5   Wrist extension C6 5 5   Elbow extension C7 5 5   Finger flexion C8 5 5   Finger abduction T1 5 5     Lower Extremity Myotome R L   Hip flexion L2 3/5 2/5 "   Knee extension L3 4/5 3/5   Ankle dorsiflexion L4 5 5   Toe extension L5 5 5   Ankle plantarflexion S1 5 5     Labs: Reviewed and significant for   Recent Labs     11/03/24 0026 11/03/24 0535 11/04/24 0426   RBC 3.35* 3.38* 3.18*   HEMOGLOBIN 11.5* 11.2* 11.0*   HEMATOCRIT 34.3* 34.1* 33.7*   PLATELETCT 48* 49* 65*     Recent Labs     11/02/24  0332 11/03/24 0026 11/04/24 0426   SODIUM 137 138 138   POTASSIUM 3.9 3.2* 3.6   CHLORIDE 99 99 100   CO2 25 25 25   GLUCOSE 79 93 116*   BUN 12 16 21   CREATININE 0.71 0.72 0.91   CALCIUM 8.9 9.0 8.7     Recent Results (from the past 24 hours)   Basic Metabolic Panel    Collection Time: 11/04/24  4:26 AM   Result Value Ref Range    Sodium 138 135 - 145 mmol/L    Potassium 3.6 3.6 - 5.5 mmol/L    Chloride 100 96 - 112 mmol/L    Co2 25 20 - 33 mmol/L    Glucose 116 (H) 65 - 99 mg/dL    Bun 21 8 - 22 mg/dL    Creatinine 0.91 0.50 - 1.40 mg/dL    Calcium 8.7 8.5 - 10.5 mg/dL    Anion Gap 13.0 7.0 - 16.0   CBC with Differential    Collection Time: 11/04/24  4:26 AM   Result Value Ref Range    WBC 5.7 4.8 - 10.8 K/uL    RBC 3.18 (L) 4.70 - 6.10 M/uL    Hemoglobin 11.0 (L) 14.0 - 18.0 g/dL    Hematocrit 33.7 (L) 42.0 - 52.0 %    .0 (H) 81.4 - 97.8 fL    MCH 34.6 (H) 27.0 - 33.0 pg    MCHC 32.6 32.3 - 36.5 g/dL    RDW 58.9 (H) 35.9 - 50.0 fL    Platelet Count 65 (L) 164 - 446 K/uL    MPV 11.3 9.0 - 12.9 fL    Neutrophils-Polys 65.10 44.00 - 72.00 %    Lymphocytes 17.70 (L) 22.00 - 41.00 %    Monocytes 9.50 0.00 - 13.40 %    Eosinophils 5.40 0.00 - 6.90 %    Basophils 1.20 0.00 - 1.80 %    Immature Granulocytes 1.10 (H) 0.00 - 0.90 %    Nucleated RBC 0.00 0.00 - 0.20 /100 WBC    Neutrophils (Absolute) 3.72 1.82 - 7.42 K/uL    Lymphs (Absolute) 1.01 1.00 - 4.80 K/uL    Monos (Absolute) 0.54 0.00 - 0.85 K/uL    Eos (Absolute) 0.31 0.00 - 0.51 K/uL    Baso (Absolute) 0.07 0.00 - 0.12 K/uL    Immature Granulocytes (abs) 0.06 0.00 - 0.11 K/uL    NRBC (Absolute) 0.00 K/uL    ESTIMATED GFR    Collection Time: 11/04/24  4:26 AM   Result Value Ref Range    GFR (CKD-EPI) 103 >60 mL/min/1.73 m 2   IMMATURE PLT FRACTION    Collection Time: 11/04/24  4:26 AM   Result Value Ref Range    Imm. Plt Fraction 7.6 0.6 - 13.1 %         ASSESSMENT:  Patient is a 49 y.o. male admitted with alcohol withdrawal     Rehabilitation: Impaired ADLs and mobility  Barriers to transfer include: Insurance authorization, TCCs to verify disposition, medical clearance and bed availability. All cases are subject to administrative review.       Disposition recommendations:  -Recommend SNF placement.  Patient does not want to go home soon, wants to spend several more weeks recovering from his alcohol use disorder.  He also will benefit from prolonged therapy.  He prefers to do this work in [place]  -PMR will sign off, please reconsult or reach out via Voalte if further evaluation or medical management is requested      Medical Complexity:    Debility from alcohol withdrawal encephalopathy  -Continues to be mildly encephalopathic  -Continue supportive care measures  -Continue PT OT and SLP while in house  -Plan for SNF placement on discharge  -All cessation counseling provided today    Thrombocytopenia  -Platelets 65, improving  -Patient needs to maintain platelets greater than 50 to safely participate in therapy  -Monitoring with serial labs    Hypertension  -Amlodipine 10 mg daily  -Coreg 6.25 mg daily    C. difficile diarrhea  -Confirmed on 11/1/2024  -On oral vancomycin Q 6 through 11 1224    Tobacco abuse  - Tobacco abuse cessation counseling given today for ~5 min as it pertains to vascular disease, heart attack, stroke, wound healing, and pulmonary disease.       DVT PPX: SCDs      Thank you for allowing us to participate in the care of this patient.     Patient was seen for >80 minutes on unit/floor of which > 50% of time was spent on counseling and coordination of care regarding the above, including prognosis,  risk reduction, benefits of treatment, and options for next stage of care.    Roberto Watt, DO   Physical Medicine and Rehabilitation     Please note that this dictation was created using voice recognition software. I have made every reasonable attempt to correct obvious errors, but there may be errors of grammar and possibly content that I did not discover before finalizing the note.

## 2024-11-04 NOTE — DISCHARGE PLANNING
[facility] Transitional Care Coordination     Referral from: Fadi Dumont  Insurance Provider on Facesheet: [facility]  Potential Rehab Diagnosis: Debility     Chart review indicates patient may have on going medical management and may have therapy needs to possibly meet inpatient rehab facility criteria with the goal of returning to community.     D/C support: unknown     Physiatry consultation forwarded per protocol  Would appreciate . OT evaluations as soon as possible      Thank you for the referral.

## 2024-11-04 NOTE — PROGRESS NOTES
Bedside report received from night shift RN. Assumed care. Pt is A&O x 4, lethargic and drowsy/needs reorienting., Pt is in bed resting. Pt denies pain at this time. Pt was updated on plan of care. Pt has call light, personal belongings, and bedside table within reach. Bed is in the lowest position and bed alarm is on. Will continue to monitor.

## 2024-11-05 LAB
BASOPHILS # BLD AUTO: 1.2 % (ref 0–1.8)
BASOPHILS # BLD: 0.07 K/UL (ref 0–0.12)
EOSINOPHIL # BLD AUTO: 0.33 K/UL (ref 0–0.51)
EOSINOPHIL NFR BLD: 5.5 % (ref 0–6.9)
ERYTHROCYTE [DISTWIDTH] IN BLOOD BY AUTOMATED COUNT: 57.4 FL (ref 35.9–50)
HCT VFR BLD AUTO: 33.6 % (ref 42–52)
HGB BLD-MCNC: 11 G/DL (ref 14–18)
IMM GRANULOCYTES # BLD AUTO: 0.05 K/UL (ref 0–0.11)
IMM GRANULOCYTES NFR BLD AUTO: 0.8 % (ref 0–0.9)
LYMPHOCYTES # BLD AUTO: 1.14 K/UL (ref 1–4.8)
LYMPHOCYTES NFR BLD: 19 % (ref 22–41)
MAGNESIUM SERPL-MCNC: 2.1 MG/DL (ref 1.5–2.5)
MCH RBC QN AUTO: 34.5 PG (ref 27–33)
MCHC RBC AUTO-ENTMCNC: 32.7 G/DL (ref 32.3–36.5)
MCV RBC AUTO: 105.3 FL (ref 81.4–97.8)
MONOCYTES # BLD AUTO: 0.68 K/UL (ref 0–0.85)
MONOCYTES NFR BLD AUTO: 11.3 % (ref 0–13.4)
NEUTROPHILS # BLD AUTO: 3.73 K/UL (ref 1.82–7.42)
NEUTROPHILS NFR BLD: 62.2 % (ref 44–72)
NRBC # BLD AUTO: 0 K/UL
NRBC BLD-RTO: 0 /100 WBC (ref 0–0.2)
PLATELET # BLD AUTO: 87 K/UL (ref 164–446)
PLATELETS.RETICULATED NFR BLD AUTO: 7.8 % (ref 0.6–13.1)
PMV BLD AUTO: 10.7 FL (ref 9–12.9)
RBC # BLD AUTO: 3.19 M/UL (ref 4.7–6.1)
TRIGL SERPL-MCNC: 101 MG/DL (ref 0–149)
WBC # BLD AUTO: 6 K/UL (ref 4.8–10.8)

## 2024-11-05 PROCEDURE — 36415 COLL VENOUS BLD VENIPUNCTURE: CPT

## 2024-11-05 PROCEDURE — 700111 HCHG RX REV CODE 636 W/ 250 OVERRIDE (IP): Performed by: HOSPITALIST

## 2024-11-05 PROCEDURE — 99231 SBSQ HOSP IP/OBS SF/LOW 25: CPT | Performed by: STUDENT IN AN ORGANIZED HEALTH CARE EDUCATION/TRAINING PROGRAM

## 2024-11-05 PROCEDURE — 700102 HCHG RX REV CODE 250 W/ 637 OVERRIDE(OP): Performed by: HOSPITALIST

## 2024-11-05 PROCEDURE — 84478 ASSAY OF TRIGLYCERIDES: CPT

## 2024-11-05 PROCEDURE — A9270 NON-COVERED ITEM OR SERVICE: HCPCS | Performed by: HOSPITALIST

## 2024-11-05 PROCEDURE — 83735 ASSAY OF MAGNESIUM: CPT

## 2024-11-05 PROCEDURE — 97530 THERAPEUTIC ACTIVITIES: CPT | Mod: CQ

## 2024-11-05 PROCEDURE — 97116 GAIT TRAINING THERAPY: CPT | Mod: CQ

## 2024-11-05 PROCEDURE — 770001 HCHG ROOM/CARE - MED/SURG/GYN PRIV*

## 2024-11-05 PROCEDURE — 85055 RETICULATED PLATELET ASSAY: CPT

## 2024-11-05 PROCEDURE — 700101 HCHG RX REV CODE 250: Performed by: HOSPITALIST

## 2024-11-05 PROCEDURE — 85025 COMPLETE CBC W/AUTO DIFF WBC: CPT

## 2024-11-05 RX ADMIN — CARVEDILOL 6.25 MG: 6.25 TABLET, FILM COATED ORAL at 06:32

## 2024-11-05 RX ADMIN — AMLODIPINE BESYLATE 10 MG: 10 TABLET ORAL at 05:44

## 2024-11-05 RX ADMIN — NICOTINE TRANSDERMAL SYSTEM 21 MG: 21 PATCH, EXTENDED RELEASE TRANSDERMAL at 05:43

## 2024-11-05 RX ADMIN — Medication 125 MG: at 11:32

## 2024-11-05 RX ADMIN — LOSARTAN POTASSIUM 50 MG: 50 TABLET, FILM COATED ORAL at 05:44

## 2024-11-05 RX ADMIN — Medication 125 MG: at 16:17

## 2024-11-05 RX ADMIN — Medication 125 MG: at 05:44

## 2024-11-05 ASSESSMENT — GAIT ASSESSMENTS
GAIT LEVEL OF ASSIST: CONTACT GUARD ASSIST
ASSISTIVE DEVICE: FRONT WHEEL WALKER
DISTANCE (FEET): 5

## 2024-11-05 ASSESSMENT — ENCOUNTER SYMPTOMS
DIZZINESS: 0
HEADACHES: 0
CHILLS: 0
DOUBLE VISION: 0
BACK PAIN: 0
MYALGIAS: 0
DIARRHEA: 1
HEARTBURN: 0
HEMOPTYSIS: 0
VOMITING: 0
PND: 0
FEVER: 0
BLURRED VISION: 0
COUGH: 0
NAUSEA: 0
CLAUDICATION: 0
PALPITATIONS: 0
WHEEZING: 0
BRUISES/BLEEDS EASILY: 0
DEPRESSION: 0

## 2024-11-05 ASSESSMENT — FIBROSIS 4 INDEX: FIB4 SCORE: 8.4

## 2024-11-05 ASSESSMENT — COGNITIVE AND FUNCTIONAL STATUS - GENERAL
SUGGESTED CMS G CODE MODIFIER MOBILITY: CK
MOVING FROM LYING ON BACK TO SITTING ON SIDE OF FLAT BED: A LITTLE
CLIMB 3 TO 5 STEPS WITH RAILING: TOTAL
WALKING IN HOSPITAL ROOM: A LITTLE
TURNING FROM BACK TO SIDE WHILE IN FLAT BAD: A LITTLE
MOBILITY SCORE: 16
MOVING TO AND FROM BED TO CHAIR: A LITTLE
STANDING UP FROM CHAIR USING ARMS: A LITTLE

## 2024-11-05 ASSESSMENT — PAIN DESCRIPTION - PAIN TYPE
TYPE: ACUTE PAIN
TYPE: ACUTE PAIN

## 2024-11-05 NOTE — PROGRESS NOTES
Bedside report received from off going RN/tech: Lulú VELASQUEZ, assumed care of patient.     Fall Risk Score: HIGH RISK  Fall risk interventions in place: Place yellow fall risk ID band on patient, Provide patient/family education based on risk assessment, Educate patient/family to call staff for assistance when getting out of bed, Place fall precaution signage outside patient door, Place patient in room close to nursing station, and Bed alarm connected correctly  Bed type: Regular (Isidro Score less than 17 interventions in place)  Patient on cardiac monitor: Yes  IVF/IV medications: Not Applicable   Oxygen: How many liters 3L  Bedside sitter: Not Applicable   Isolation: Isolation precautions in place

## 2024-11-05 NOTE — PROGRESS NOTES
Hospital Medicine Daily Progress Note    Date of Service  11/5/2024    Chief Complaint  Jethro Cook is a 49 y.o. male admitted 10/30/2024 with alcohol withdrawal, encephalopathy    Hospital Course  49 y.o. male who presented 10/30/2024 with a history of ETOH abuse, HLD, tobacco use, CAD.  Found down by family last seen 24hrs previously.  Intubated by EMS.  BAL >400, , LA 7.9, , core temp 27.5c.  extubated 10/29. Patient treated for alcohol withdrawal which has resolved. Hospitalization notable for C diff infection, patient started on oral vancomycin with improvement of diarrhea. PT/OT recommend post acute services. Patient is medically cleared, pending SNF.     Interval Problem Update  No acute events overnight.  Patient has no complaints.  Continue vancomycin for c diff infection, diarrhea reportedly improving.  Patient is medically cleared, pending SNF.  CM working on possible [facility] SNF.      I have discussed this patient's plan of care and discharge plan at IDT rounds today with Case Management, Nursing, Nursing leadership, and other members of the IDT team.    Consultants/Specialty  Critical care    Code Status  Full Code    Disposition  Medically Cleared  I have placed the appropriate orders for post-discharge needs.    Review of Systems  Review of Systems   Unable to perform ROS: Mental acuity   Constitutional:  Negative for chills and fever.   Eyes:  Negative for blurred vision and double vision.   Respiratory:  Negative for cough, hemoptysis and wheezing.    Cardiovascular:  Negative for chest pain, palpitations, claudication, leg swelling and PND.   Gastrointestinal:  Positive for diarrhea. Negative for heartburn, nausea and vomiting.   Genitourinary:  Negative for hematuria and urgency.   Musculoskeletal:  Negative for back pain and myalgias.   Skin:  Negative for rash.   Neurological:  Negative for dizziness and headaches.   Endo/Heme/Allergies:  Does not bruise/bleed easily.    Psychiatric/Behavioral:  Negative for depression.         Physical Exam  Temp:  [36.4 °C (97.5 °F)-37.1 °C (98.8 °F)] 37 °C (98.6 °F)  Pulse:  [81-89] 83  Resp:  [16-20] 20  BP: (105-128)/(69-87) 105/70  SpO2:  [92 %-97 %] 96 %    Physical Exam  Vitals and nursing note reviewed.   Constitutional:       Appearance: He is ill-appearing.   HENT:      Head: Normocephalic.   Eyes:      General:         Right eye: No discharge.         Left eye: No discharge.      Conjunctiva/sclera: Conjunctivae normal.   Cardiovascular:      Rate and Rhythm: Regular rhythm. Tachycardia present.      Pulses: Normal pulses.      Heart sounds: Normal heart sounds.   Pulmonary:      Effort: Pulmonary effort is normal.      Breath sounds: Normal breath sounds.   Abdominal:      General: Bowel sounds are normal. There is no distension.      Tenderness: There is no guarding.   Musculoskeletal:         General: Normal range of motion.      Cervical back: Normal range of motion and neck supple.      Right lower leg: No edema.      Left lower leg: No edema.   Skin:     General: Skin is warm.      Capillary Refill: Capillary refill takes less than 2 seconds.   Neurological:      General: No focal deficit present.      Mental Status: He is alert.      Comments: Partially oriented, able to move all his extremities.   Psychiatric:         Mood and Affect: Mood normal.         Fluids    Intake/Output Summary (Last 24 hours) at 11/5/2024 1244  Last data filed at 11/5/2024 1139  Gross per 24 hour   Intake 880 ml   Output 1825 ml   Net -945 ml        Laboratory  Recent Labs     11/03/24  0535 11/04/24  0426 11/05/24  0300   WBC 8.0 5.7 6.0   RBC 3.38* 3.18* 3.19*   HEMOGLOBIN 11.2* 11.0* 11.0*   HEMATOCRIT 34.1* 33.7* 33.6*   .9* 106.0* 105.3*   MCH 33.1* 34.6* 34.5*   MCHC 32.8 32.6 32.7   RDW 55.7* 58.9* 57.4*   PLATELETCT 49* 65* 87*   MPV 10.9 11.3 10.7     Recent Labs     11/03/24  0026 11/04/24  0426   SODIUM 138 138   POTASSIUM 3.2* 3.6    CHLORIDE 99 100   CO2 25 25   GLUCOSE 93 116*   BUN 16 21   CREATININE 0.72 0.91   CALCIUM 9.0 8.7               Recent Labs     11/05/24  0300   TRIGLYCERIDE 101       Imaging  DX-CHEST-PORTABLE (1 VIEW)   Final Result   Impression:      1. Shallow breath post extubation.      2. Mild infiltrates. No effusions.                     DX-CHEST-PORTABLE (1 VIEW)   Final Result      Bilateral infiltrates, left worse than right.      EC-ECHOCARDIOGRAM COMPLETE W/ CONT   Final Result      DX-CHEST-LIMITED (1 VIEW)   Final Result         1. Interval placement of a right internal jugular central venous access catheter terminating over the superior vena cava. No postprocedure visible pneumothorax.   2. Adjustment of the endotracheal and nasogastric tubes, now appropriately positioned.   3. Resolution of gastric distention with air.      CT-LSPINE W/O PLUS RECONS   Final Result      1. No acute osseous abnormality involving the lumbar spine.   2. L5-S1 degenerative disc disease and facet arthrosis.   3. For imaging findings regarding the remainder of the abdomen and pelvis, please see CT of the chest, abdomen and pelvis performed the same day.      CT-TSPINE W/O PLUS RECONS   Final Result      1. No acute posttraumatic imaging findings in the thoracic spine.   2. Appropriate positions of the endotracheal and nasogastric tubes.   3. For the imaging findings in the remainder of the chest and abdomen, please see CT chest, abdomen and pelvis performed the same day.      CT-CHEST,ABDOMEN,PELVIS WITH   Final Result      1.  BILATERAL lower lobe atelectasis. Superimposed pneumonia not excluded   2.  Edema in the retroperitoneum could indicate pancreatitis, gastritis or duodenitis   3.  Hepatic steatosis   4.  Trace ascites   5.  LEFT inguinal hernia contains fat   6.  Thyroid gland is enlarged and heterogenous, recommend correlation with laboratory studies and ultrasound when clinically appropriate      CT-CSPINE WITHOUT PLUS  RECONS   Final Result      1.  No acute abnormality identified.   2.  Multilevel multifactorial degenerative changes   3.  Heterogeneous enlarged thyroid, recommend correlation with laboratory studies and ultrasound when clinically appropriate      CT-MAXILLOFACIAL W/O PLUS RECONS   Final Result      1. No acute facial bone fracture.   2. The lens of the left globe is displaced posteriorly into the vitreous humor, best seen on series 9 image 108. It was appropriately positioned on the prior exam performed on 1/28/2024.   3. Intracranial atherosclerotic calcifications.      CT-HEAD W/O   Final Result   Addendum (preliminary) 1 of 1   LEFT globe injury with displaced LEFT lung is better seen on the    maxillofacial CT performed at the same time      Final      No acute intracranial abnormality.            US-ABDOMEN F.A.S.T. LTD (FOR ED USE ONLY)   Final Result      No free fluid seen in all 4 quadrants.      Negative FAST scan.            DX-CHEST-LIMITED (1 VIEW)   Final Result         1. The endotracheal tube terminates at the lorenza; withdrawal 4 cm is recommended.   2. Advancement of the nasogastric tube 20 cm is recommended. There is gastric distention with air.   3. Low lung volumes.      DX-PELVIS-1 OR 2 VIEWS   Final Result      No           Assessment/Plan  * Acute encephalopathy- (present on admission)  Assessment & Plan  Acute alcohol intoxication with blood level greater than 400, and hypothermia.  Now that these issues have been addressed, the patient is awake alert and interactive  Partially oriented  Continue close monitoring  No focal weakness  Continue monitoring, patient appears to be more oriented no focal weakness    Back to  baseline.     Thrombocytopenia (HCC)  Assessment & Plan  Hold the Lovenox  Platelet count 49 today. Stable.     C. difficile diarrhea  Assessment & Plan  Started on oral vancomycin  Continue isolation per protocol    Continue po vanco  Diarrhea improved.     Alcohol  withdrawal syndrome without complication (HCC)  Assessment & Plan  Patient is starting to exhibit signs of withdrawal  Start scheduled Librium for seizure prophylaxis  Start benzodiazepine withdrawal program  Seizure precautions  Close monitoring of mental status and airway  Improved.     Macrocytic anemia- (present on admission)  Assessment & Plan  Likely related to the patient's alcohol intake  IV thiamine and p.o. folate  Dietary consultation to improve nutritional status    Hyponatremia- (present on admission)  Assessment & Plan  Insetting of glucose greater than 600.  Corrected into the normal range keeping glucose in mind  Na 138    Electrolyte abnormality- (present on admission)  Assessment & Plan  Following and replacing daily magnesium, phosphorus, potassium.  Monitoring daily electrolytes    Lactic acidosis- (present on admission)  Assessment & Plan  Alcohol versus malnutrition versus hypovolemic versus seizure  Resolved    Hypothermia- (present on admission)  Assessment & Plan  Patient was hypothermic after being altered and found down  Now s/p rewarming    Shock (HCC)- (present on admission)  Assessment & Plan  Patient came in hypothermic  Has responded to fluid resuscitation and rewarming     Blood pressure improved, will restart his blood pressure medications with close monitoring    Hyperglycemia- (present on admission)  Assessment & Plan  Glycohemoglobin 4.8  Question stress response  Continue to monitor    Acute alcoholic intoxication with complication (HCC)- (present on admission)  Assessment & Plan  Blood level greater than 400  Encourage alcohol cessation  IV thiamine load  P.o. folate and MVI  Starting to withdrawal  Continue monitoring CIWA protocol    improved      Acute respiratory failure with hypoxia (HCC)- (present on admission)  Assessment & Plan  Patient intubated for airway protection in setting of altered mentation.  Now that the patient is thinking clearly, and with a normal level of  consciousness, he is protecting his airway and oxygenating.  Continue O2 and RT protocols  Mobilize  Encourage smoking cessation    Continue weaning off oxygen  Start on IV Lasix  Continue monitoring I's and O's    Continue weaning off o2.     Primary hypertension- (present on admission)  Assessment & Plan  As outpatient was maintained on losartan 100 mg daily, Coreg 6.25 mg twice daily, and amlodipine 10 mg daily  Currently going through withdrawals so titration of his chronic outpatient medications is best left to a later time when he is no longer in DTs    Improved  Continue monitoring  Restarted losartan 50 mg daily    Transaminitis- (present on admission)  Assessment & Plan  Pattern consistent with alcohol which fits with the patient's history  CT imaging shows hepatic steatosis, no other pathology seen  LFTs are trending down  Encourage alcohol cessation  Continue to monitor, slowly trending down    NSTEMI (non-ST elevated myocardial infarction) (HCC)- (present on admission)  Assessment & Plan  Insetting of acute hypothermia.  EKG was nonischemic  Echo was negative for wall motion abnormalities         VTE prophylaxis: Lovenox, monitoring platelet count    I have performed a physical exam and reviewed and updated ROS and Plan today (11/5/2024). In review of yesterday's note (11/4/2024), there are no changes except as documented above.

## 2024-11-05 NOTE — DISCHARGE PLANNING
Case Management Discharge Planning    Admission Date: 10/30/2024  GMLOS: 4.9  ALOS: 6    6-Clicks ADL Score: 19  6-Clicks Mobility Score: 10  PT and/or OT Eval ordered: Yes  Post-acute Referrals Ordered: Yes  Post-acute Choice Obtained: NA  Has referral(s) been sent to post-acute provider:  PAZ      Anticipated Discharge Dispo: Discharge Disposition: D/T to SNF with Medicare cert in anticipation of skilled care (03)    DME Needed: Pending hospital course     Action(s) Taken: Pt discussed in IDT rounds that there is no accepting SNFs at the moment. LMSW reicved a VM from sister on pt wanting to transfer to the [facility] [address] [facility]. Pt is currently [facility] and has no Acute need. Referral was sent to [facility] sent. DPA left VM to [facility]. LMSW discussed for bedside nursing to have pt up mobilizing due to no SNFs accepting. Pt has VA benefits but is only 10% service connected.    1109 Pt discussed in IDT rounds that pt is Max assist. LMSW will follow up with [facility].    1200 LMSW attempted to call sister back twice and phone went straight to

## 2024-11-05 NOTE — CARE PLAN
The patient is Stable - Low risk of patient condition declining or worsening    Shift Goals  Clinical Goals: q4 neuro checks, monitor VS, skin protection  Patient Goals: rest and eat  Family Goals: elise    Progress made toward(s) clinical / shift goals:    Problem: Pain - Standard  Goal: Alleviation of pain or a reduction in pain to the patient’s comfort goal  Note: Assess and monitor for pain. Provide pharmacological and non-pharmacological interventions as appropriate. Re-evaluate and continue to monitor.       Problem: Skin Integrity  Goal: Skin integrity is maintained or improved  Note: Assess skin and monitor for skin breakdown. Alleviate pressure to bony prominences and provide assistance with turning, repositioning, ROM and mobility as appropriate. Use of mepilex and barrier cream as needed. Continue to monitor.       Problem: Fall Risk  Goal: Patient will remain free from falls  Note: Treaded socks and bed/strip alarm on, side rails up x 3. Call light within reach. Pt educated to call for assistance. Reinforce as needed. Continue to monitor.          Patient is not progressing towards the following goals:

## 2024-11-05 NOTE — DISCHARGE PLANNING
-1006  DPA sent referral to [facility] and left a message for Rosa Maria with facility, requesting a callback regarding referral.     -1220  DPA spoke with Rosa Maria from [facility]. Rosa Maria has not yet reviewed SNF referral but will review it when she can. If facility can accept pt then Rosa Maria can put him on a wait list, but facility will not have any open beds in the near future. Wait list already has several people waiting for a bed.

## 2024-11-05 NOTE — THERAPY
"Occupational Therapy   Initial Evaluation     Patient Name: Jethro Cook  Age:  49 y.o., Sex:  male  Medical Record #: [id number]  Today's Date: 11/4/2024     Precautions  Precautions: Fall Risk  Comments: dejuan YANCEY pupil    Assessment    Patient is 49 y.o. male admitted with alcohol withdrawal and encephalopathy. Other pertinent medical history includes ETOH abuse, HLD, tobacco use, and CAD. Pt seen for OT evaluation. Pt required min-mod A for bed mobility, standing side steps near EOB, and donning/doffing socks. Pt was able to wipe his face with SBA while seated EOB. Pt was a questionable historian so it would be beneficial to confirm history. Pt current functional performance limited by impaired activity tolerance, impaired balance, impaired cognition, and impaired coordination. Pt will benefit from skilled OT while admitted to acute care.     Plan    Occupational Therapy Initial Treatment Plan   Treatment Interventions: Self Care / Activities of Daily Living, Adaptive Equipment, Neuro Re-Education / Balance, Therapeutic Exercises, Therapeutic Activity  Treatment Frequency: 3 Times per Week  Duration: Until Therapy Goals Met    DC Equipment Recommendations: Unable to determine at this time  Discharge Recommendations: Recommend post-acute placement for additional occupational therapy services prior to discharge home      Objective     11/04/24 1509   Prior Living Situation   Prior Services None   Housing / Facility 1 Story House   Steps Into Home   (Initially stated 3 and then 2)   Bathroom Set up Walk In Shower;Bathtub / Shower Combination;Shower Chair   Equipment Owned Tub / Shower Seat   Lives with - Patient's Self Care Capacity Other (Comments)   Comments Pt reported that he \"occasionally lives with his son\". Pt was a questionable historian, so it would be beneficial to confirm history.   Prior Level of ADL Function   Self Feeding Independent   Grooming / Hygiene Independent   Bathing Independent   Dressing " Independent   Toileting Independent   Prior Level of IADL Function   Medication Management Independent   Laundry Independent   Kitchen Mobility Independent   Finances Independent   Home Management Independent   Shopping Independent   Prior Level Of Mobility Independent Without Device in Community;Independent Without Device in Home   Driving / Transportation Utilizes Public Transportation   Occupation (Pre-Hospital Vocational)   (Initially stated that he was employed at [employer] and then that he was fired. Later started talking about construction work.)   History of Falls   History of Falls Yes   Date of Last Fall   (Pt reported 3 falls in the past year that were all related to alcohol.)   Precautions   Precautions Fall Risk   Pain   Pain Scales 0 to 10 Scale    Pain 0 - 10 Group   Therapist Pain Assessment Post Activity Pain Same as Prior to Activity;Nurse Notified  (Not rated, agreeable to eval)   Non Verbal Descriptors   Non Verbal Scale  Calm   Cognition    Cognition / Consciousness X   Speech/ Communication Delayed Responses   Orientation Level Oriented x 4   Level of Consciousness Alert   Ability To Follow Commands 1 Step   New Learning Impaired   Attention Impaired   Initiation Impaired   Comments Pleasant and cooperative   Passive ROM Upper Body   Passive ROM Upper Body WDL   Active ROM Upper Body   Active ROM Upper Body  WDL   Strength Upper Body   Upper Body Strength  WDL   Sensation Upper Body   Upper Extremity Sensation  Not Tested   Upper Body Muscle Tone   Upper Body Muscle Tone  WDL   Coordination Upper Body   Comments FTN slightly impaired, finger opposition slightly impaired   Balance Assessment   Sitting Balance (Static) Fair   Sitting Balance (Dynamic) Fair -   Standing Balance (Static) Poor +   Standing Balance (Dynamic) Poor +   Weight Shift Sitting Fair   Weight Shift Standing Poor   Comments w/ FWW, noted posterior sway in standing   Bed Mobility    Supine to Sit Moderate Assist   Sit to  Supine Minimal Assist   Scooting Minimal Assist   Rolling Minimal Assist to Rt.;Minimum Assist to Lt.   Comments HOB slightly elevated, cues for sequencing, poor motor planning, extra time required   ADL Assessment   Grooming Minimal Assist;Seated  (washed face)   Lower Body Dressing Moderate Assist  (don/doff socks)   Functional Mobility   Sit to Stand Moderate Assist   Bed, Chair, Wheelchair Transfer Unable to Participate   Transfer Method Stand Step   Mobility EOB>side steps near EOB>supine   Comments w/ FWW   Visual Perception   Visual Perception  Not Tested   Activity Tolerance   Sitting in Chair NT   Sitting Edge of Bed >5 min   Standing <2-3 min total   Comments limited by weakness, fatigue   Patient / Family Goals   Patient / Family Goal #1 to get stronger   Short Term Goals   Short Term Goal # 1 Pt will perform ADL transfer w/ supv   Short Term Goal # 2 Pt will perform standing g/h w/ supv   Short Term Goal # 3 Pt will perform LB dressing w/ supv and AE PRN   Education Group   Education Provided Role of Occupational Therapist;Activities of Daily Living   Role of Occupational Therapist Patient Response Patient;Acceptance;Explanation;Verbal Demonstration   ADL Patient Response Patient;Acceptance;Explanation;Demonstration;Verbal Demonstration;Action Demonstration   Occupational Therapy Initial Treatment Plan    Treatment Interventions Self Care / Activities of Daily Living;Adaptive Equipment;Neuro Re-Education / Balance;Therapeutic Exercises;Therapeutic Activity   Treatment Frequency 3 Times per Week   Duration Until Therapy Goals Met   Problem List   Problem List Decreased Active Daily Living Skills;Decreased Homemaking Skills;Decreased Functional Mobility;Decreased Activity Tolerance;Impaired Cognitive Function;Impaired Postural Control / Balance;Limited Knowledge of Post Op Precautions;Impaired Coordination Right Upper Extremity;Impaired Coordination Left Upper Extremity;Safety Awareness Deficits /  Cognition

## 2024-11-05 NOTE — CARE PLAN
The patient is Watcher - Medium risk of patient condition declining or worsening  Problem: Knowledge Deficit - Standard  Goal: Patient and family/care givers will demonstrate understanding of plan of care, disease process/condition, diagnostic tests and medications  Description: Target End Date:  1-3 days or as soon as patient condition allows    Document in Patient Education    1.  Patient and family/caregiver oriented to unit, equipment, visitation policy and means for communicating concern  2.  Complete/review Learning Assessment  3.  Assess knowledge level of disease process/condition, treatment plan, diagnostic tests and medications  4.  Explain disease process/condition, treatment plan, diagnostic tests and medications  Outcome: Progressing     Problem: Pain - Standard  Goal: Alleviation of pain or a reduction in pain to the patient’s comfort goal  Description: Target End Date:  Prior to discharge or change in level of care    Document on Vitals flowsheet    1.  Document pain using the appropriate pain scale per order or unit policy  2.  Educate and implement non-pharmacologic comfort measures (i.e. relaxation, distraction, massage, cold/heat therapy, etc.)  3.  Pain management medications as ordered  4.  Reassess pain after pain med administration per policy  5.  If opiods administered assess patient's response to pain medication is appropriate per POSS sedation scale  6.  Follow pain management plan developed in collaboration with patient and interdisciplinary team (including palliative care or pain specialists if applicable)  Outcome: Progressing       Shift Goals  Clinical Goals: Q4 neuro, Q2 turns, safety  Patient Goals: rest  Family Goals: elise    Progress made toward(s) clinical / shift goals:  Pt updated on POC    Patient is not progressing towards the following goals:

## 2024-11-05 NOTE — PROGRESS NOTES
Bedside report received from off going RN/tech: Berenice, assumed care of patient.     Fall Risk Score: HIGH RISK  Fall risk interventions in place: Place yellow fall risk ID band on patient, Provide patient/family education based on risk assessment, Educate patient/family to call staff for assistance when getting out of bed, Place fall precaution signage outside patient door, Place patient in room close to nursing station, Utilize bed/chair fall alarm, Notify charge of high risk for huddle, and Bed alarm connected correctly  Bed type: Low air loss (Isidro Score less than 17 interventions in place)  Patient on cardiac monitor: Yes  IVF/IV medications: Not Applicable   Oxygen: How many liters 3L, Traced the line to wall oxygen, and No oxygen tank in room  Bedside sitter: Not Applicable   Isolation: Isolation precautions in place Special Contact

## 2024-11-06 PROCEDURE — 97530 THERAPEUTIC ACTIVITIES: CPT | Mod: CQ

## 2024-11-06 PROCEDURE — 700111 HCHG RX REV CODE 636 W/ 250 OVERRIDE (IP): Performed by: HOSPITALIST

## 2024-11-06 PROCEDURE — 700102 HCHG RX REV CODE 250 W/ 637 OVERRIDE(OP): Performed by: STUDENT IN AN ORGANIZED HEALTH CARE EDUCATION/TRAINING PROGRAM

## 2024-11-06 PROCEDURE — 700102 HCHG RX REV CODE 250 W/ 637 OVERRIDE(OP): Performed by: HOSPITALIST

## 2024-11-06 PROCEDURE — 99231 SBSQ HOSP IP/OBS SF/LOW 25: CPT | Performed by: STUDENT IN AN ORGANIZED HEALTH CARE EDUCATION/TRAINING PROGRAM

## 2024-11-06 PROCEDURE — 700101 HCHG RX REV CODE 250: Performed by: HOSPITALIST

## 2024-11-06 PROCEDURE — A9270 NON-COVERED ITEM OR SERVICE: HCPCS | Performed by: STUDENT IN AN ORGANIZED HEALTH CARE EDUCATION/TRAINING PROGRAM

## 2024-11-06 PROCEDURE — 97116 GAIT TRAINING THERAPY: CPT | Mod: CQ

## 2024-11-06 PROCEDURE — A9270 NON-COVERED ITEM OR SERVICE: HCPCS | Performed by: HOSPITALIST

## 2024-11-06 PROCEDURE — 770001 HCHG ROOM/CARE - MED/SURG/GYN PRIV*

## 2024-11-06 PROCEDURE — 97535 SELF CARE MNGMENT TRAINING: CPT

## 2024-11-06 RX ADMIN — LOSARTAN POTASSIUM 50 MG: 50 TABLET, FILM COATED ORAL at 05:17

## 2024-11-06 RX ADMIN — NICOTINE TRANSDERMAL SYSTEM 21 MG: 21 PATCH, EXTENDED RELEASE TRANSDERMAL at 05:17

## 2024-11-06 RX ADMIN — Medication 125 MG: at 11:55

## 2024-11-06 RX ADMIN — CARVEDILOL 6.25 MG: 6.25 TABLET, FILM COATED ORAL at 17:39

## 2024-11-06 RX ADMIN — AMLODIPINE BESYLATE 10 MG: 10 TABLET ORAL at 05:17

## 2024-11-06 RX ADMIN — Medication 125 MG: at 00:08

## 2024-11-06 RX ADMIN — Medication 125 MG: at 05:17

## 2024-11-06 RX ADMIN — Medication 125 MG: at 17:39

## 2024-11-06 RX ADMIN — CARVEDILOL 6.25 MG: 6.25 TABLET, FILM COATED ORAL at 09:06

## 2024-11-06 ASSESSMENT — COGNITIVE AND FUNCTIONAL STATUS - GENERAL
MOBILITY SCORE: 18
HELP NEEDED FOR BATHING: A LITTLE
DAILY ACTIVITIY SCORE: 19
SUGGESTED CMS G CODE MODIFIER MOBILITY: CK
SUGGESTED CMS G CODE MODIFIER DAILY ACTIVITY: CK
DRESSING REGULAR UPPER BODY CLOTHING: A LITTLE
CLIMB 3 TO 5 STEPS WITH RAILING: A LITTLE
STANDING UP FROM CHAIR USING ARMS: A LITTLE
TOILETING: A LITTLE
DRESSING REGULAR LOWER BODY CLOTHING: A LITTLE
MOVING FROM LYING ON BACK TO SITTING ON SIDE OF FLAT BED: A LITTLE
TURNING FROM BACK TO SIDE WHILE IN FLAT BAD: A LITTLE
MOVING TO AND FROM BED TO CHAIR: A LITTLE
WALKING IN HOSPITAL ROOM: A LITTLE
PERSONAL GROOMING: A LITTLE

## 2024-11-06 ASSESSMENT — ENCOUNTER SYMPTOMS
MYALGIAS: 0
BLURRED VISION: 0
BRUISES/BLEEDS EASILY: 0
CHILLS: 0
HEADACHES: 0
DEPRESSION: 0
VOMITING: 0
CLAUDICATION: 0
FEVER: 0
HEARTBURN: 0
PND: 0
WHEEZING: 0
DIZZINESS: 0
DIARRHEA: 1
PALPITATIONS: 0
COUGH: 0
NAUSEA: 0
DOUBLE VISION: 0
HEMOPTYSIS: 0
BACK PAIN: 0

## 2024-11-06 ASSESSMENT — GAIT ASSESSMENTS
DEVIATION: DECREASED BASE OF SUPPORT
GAIT LEVEL OF ASSIST: STANDBY ASSIST
ASSISTIVE DEVICE: FRONT WHEEL WALKER

## 2024-11-06 ASSESSMENT — PAIN DESCRIPTION - PAIN TYPE: TYPE: ACUTE PAIN

## 2024-11-06 ASSESSMENT — FIBROSIS 4 INDEX: FIB4 SCORE: 8.4

## 2024-11-06 NOTE — THERAPY
"Physical Therapy   Daily Treatment     Patient Name: Jethro Cook  Age:  49 y.o., Sex:  male  Medical Record #: [id number]  Today's Date: 11/5/2024     Precautions  Precautions: Fall Risk  Comments: NAYE SMITH pupil    Assessment    Pt greeted and seen for PT treatment. Pt did not need assist for bed mobility, he stood w/ CGA and FWW. He can walk short distances but is limited by legs becoming very tremulous and needing to sit. If patient keeps progressing as he does he may be able to return to home with assist of family.  Pt currently limited by impaired balance, decreased strength, impaired safety and decreased activity tolerance which negatively impacts functional mobility. Pt will continue to benefit from skilled PT to address deficits.       Plan    Treatment Plan Status: Continue Current Treatment Plan  Type of Treatment: Bed Mobility, Equipment, Family / Caregiver Training, Gait Training, Manual Therapy, Neuro Re-Education / Balance, Self Care / Home Evaluation, Stair Training, Therapeutic Activities, Therapeutic Exercise  Treatment Frequency: 4 Times per Week  Treatment Duration: Until Therapy Goals Met    DC Equipment Recommendations: Unable to determine at this time  Discharge Recommendations: Recommend post-acute placement for additional physical therapy services prior to discharge home      Subjective  \"It's ok if I fall\"       11/05/24 1441   Vitals   Pulse Oximetry 96 %   O2 (LPM) 0   O2 Delivery Device Room air w/o2 available   Pain 0 - 10 Group   Therapist Pain Assessment Post Activity Pain Same as Prior to Activity;Nurse Notified  (no c/o pain)   Cognition    Level of Consciousness Alert   Comments pleasant and cooperative, impaired insight into current deficits   Balance   Sitting Balance (Static) Fair   Sitting Balance (Dynamic) Fair   Standing Balance (Static) Fair -   Standing Balance (Dynamic) Poor +   Weight Shift Sitting Fair   Weight Shift Standing Fair   Skilled Intervention Verbal " Cuing;Sequencing;Facilitation;Compensatory Strategies   Comments w/ FWW   Bed Mobility    Supine to Sit Supervised   Scooting Supervised   Rolling Supervised   Skilled Intervention Verbal Cuing   Comments flat HOB   Gait Analysis   Gait Level Of Assist Contact Guard Assist   Assistive Device Front Wheel Walker   Distance (Feet) 5   # of Times Distance was Traveled 3   Deviation   (B LE tremulous throughout time up)   Skilled Intervention Verbal Cuing;Sequencing;Compensatory Strategies   Comments Pt able to ambulate short distance but legs become very tremulous requiring patient to sit   Functional Mobility   Sit to Stand Contact Guard Assist   Bed, Chair, Wheelchair Transfer Minimal Assist   Transfer Method Stand Step   Mobility bed>chair>walking   Skilled Intervention Verbal Cuing;Sequencing;Compensatory Strategies   Comments Pt impulsive to sit prior to completing turning around, pt ed on sequencing to decreased falls risk   Short Term Goals    Short Term Goal # 1 Pt will perform supine <> sit with SPV in 6 visits to get in/out of bed   Goal Outcome # 1 Progressing as expected   Short Term Goal # 2 Pt will perform stand step transfers with FWW and Prudencio in 6 visits to get in/out of chair   Goal Outcome # 2 Goal met   Short Term Goal # 3 Pt will ambulate 50ft with FWW and Prudencio in 6 visits to access home environment   Goal Outcome # 3 Progressing as expected   Short Term Goal # 4 Pt will ascend/descend 20 steps with unilateral UE support and SPV in 6 visits to safely enter/exit home   Goal Outcome # 4 Goal not met   Supervising Physical Therapist (PTA Treatments Only)   Supervising Physical Therapist Sapphire Porter

## 2024-11-06 NOTE — DISCHARGE PLANNING
"Case Management Discharge Planning    Admission Date: 10/30/2024  GMLOS: 4.9  ALOS: 7    6-Clicks ADL Score: 19  6-Clicks Mobility Score: 18      Anticipated Discharge Dispo: Discharge Disposition: Discharged to home/self care (01)    DME Needed: Pending hospital course    Action(s) Taken: LMSW met with pt to gather more information for DC. [facility] and since pt has no acute need transfer is not possible. LMSW asked him about his selling his house. Pt stated that his plan to sell his house was a \"quick sale\" but due to being in the hospital his plan is to hold off on the sale until he get better.     Pt stated that he currently has no income because he has lost his job about 2 weeks ago but from the [facility] of $180.     LMSW informed him of trouble getting any placement and pt stated he understood that he may simply have to go home but pt stated that if he goes home he can not go home until Friday because that is when the bus [employer] goes to [place]. LMSW informed him if needed we can help with transportation.     LMSW collaborated with Physical therapy and per PT pt is doing great with walking on his own. PT to continue working with pt.           "

## 2024-11-06 NOTE — RESPIRATORY CARE
" SMOKING CESSATION EDUCATION by COPD CLINICAL EDUCATOR  11/6/2024 at 1:28 PM by Moshe Barber, RRT     Smoking Cessation Intervention and education completed, 11 minutes spent on smoking cessation education with patient.  Provided smoking cessation packet with \"Tips to Quit\" and brochure for \"Free Smoking Cessation Classes\".       "

## 2024-11-06 NOTE — THERAPY
Occupational Therapy  Daily Treatment     Patient Name: Jethro Cook  Age:  49 y.o., Sex:  male  Medical Record #: [id number]  Today's Date: 11/6/2024     Precautions  Precautions: Fall Risk  Comments: dejuan YANCEY pupil    Assessment    Pt seen for OT treatment. Pt required SBA for functional mobility, min A to don/doff socks, and min A to wash hands while standing. Pt current functional performance limited by impaired activity tolerance, impaired balance, and generalized weakness. Pt will benefit from skilled OT while admitted to acute care.     Plan    Treatment Plan Status: Continue Current Treatment Plan  Type of Treatment: Self Care / Activities of Daily Living, Adaptive Equipment, Neuro Re-Education / Balance, Therapeutic Exercises, Therapeutic Activity  Treatment Frequency: 3 Times per Week  Treatment Duration: Until Therapy Goals Met    DC Equipment Recommendations: Unable to determine at this time  Discharge Recommendations: Recommend post-acute placement for additional occupational therapy services prior to discharge home     Objective     11/06/24 0855   Pain   Pain Scales 0 to 10 Scale    Pain 0 - 10 Group   Therapist Pain Assessment Post Activity Pain Same as Prior to Activity;Nurse Notified  (Not rated, agreeable to session)   Non Verbal Descriptors   Non Verbal Scale  Calm   Cognition    Cognition / Consciousness X   Speech/ Communication Delayed Responses   Orientation Level Oriented x 4   Level of Consciousness Alert   Ability To Follow Commands 2 Step   Comments Pleasant and cooperative   Other Treatments   Other Treatments Provided Discussed the importance of getting out of bed with nursing staff as able.   Balance   Sitting Balance (Static) Fair   Sitting Balance (Dynamic) Fair   Standing Balance (Static) Fair   Standing Balance (Dynamic) Fair -   Weight Shift Sitting Fair   Weight Shift Standing Fair   Skilled Intervention Verbal Cuing;Sequencing;Tactile Cuing;Facilitation   Comments w/ FWW   Bed  Mobility    Comments up to chair pre/post   Activities of Daily Living   Grooming Standing;Minimal Assist  (washed hands at sink)   Lower Body Dressing Minimal Assist  (don/doff socks)   Skilled Intervention Verbal Cuing;Tactile Cuing;Sequencing;Facilitation   Functional Mobility   Sit to Stand Standby Assist   Bed, Chair, Wheelchair Transfer Standby Assist   Transfer Method Stand Step   Mobility chair>sink>chair   Skilled Intervention Verbal Cuing;Tactile Cuing;Sequencing;Facilitation   Comments w/ FWW   Visual Perception   Visual Perception  Not Tested   Activity Tolerance   Sitting in Chair up to chair pre/post   Sitting Edge of Bed NT   Standing ~5 min   Comments limited by weakness   Patient / Family Goals   Patient / Family Goal #1 to get stronger   Goal #1 Outcome Progressing as expected   Short Term Goals   Short Term Goal # 1 Pt will perform ADL transfer w/ supv   Goal Outcome # 1 Progressing as expected   Short Term Goal # 2 Pt will perform standing g/h w/ supv   Goal Outcome # 2 Progressing as expected   Short Term Goal # 3 Pt will perform LB dressing w/ supv and AE PRN   Goal Outcome # 3 Progressing as expected   Education Group   Education Provided Role of Occupational Therapist;Activities of Daily Living;Pathology of bedrest   Role of Occupational Therapist Patient Response Patient;Acceptance;Explanation;Verbal Demonstration   ADL Patient Response Patient;Acceptance;Explanation;Demonstration;Verbal Demonstration;Action Demonstration   Pathology of Bedrest Patient Response Patient;Acceptance;Explanation;Demonstration;Verbal Demonstration;Action Demonstration

## 2024-11-06 NOTE — THERAPY
"Physical Therapy   Daily Treatment     Patient Name: Jethro Cook  Age:  49 y.o., Sex:  male  Medical Record #: [id number]  Today's Date: 11/6/2024     Precautions  Precautions: Fall Risk  Comments: dejuan YANCEY pupil    Assessment    Pt greeted and seen for PT treatment. Pt is progressing well, anticipate ability to return home in 1-2 session. Today he stood and ambulated 100ft then 70ft in the room w/ FWW and SBA, no LOB. He demo'd improved safety awareness. Pt currently limited by impaired balance and decreased activity tolerance which negatively impacts functional mobility. Pt will continue to benefit from skilled PT to address deficits.       Plan    Treatment Plan Status: Continue Current Treatment Plan  Type of Treatment: Bed Mobility, Equipment, Family / Caregiver Training, Gait Training, Manual Therapy, Neuro Re-Education / Balance, Self Care / Home Evaluation, Stair Training, Therapeutic Activities, Therapeutic Exercise  Treatment Frequency: 4 Times per Week  Treatment Duration: Until Therapy Goals Met    DC Equipment Recommendations: Unable to determine at this time  Discharge Recommendations: Recommend home health for continued physical therapy services      Subjective  \"I have to take about 20 steps on [place] gravel driveway around [place] to get to the pouch then theres 1 stair\"       11/06/24 0923   Vitals   Pulse Oximetry 95 %   O2 (LPM) 0   O2 Delivery Device Room air w/o2 available   Pain 0 - 10 Group   Therapist Pain Assessment Post Activity Pain Same as Prior to Activity;Nurse Notified  (no c/o pain)   Cognition    Level of Consciousness Alert   Comments pleasant and cooperative.   Other Treatments   Other Treatments Provided Therapist re-inquired about home set up. Pt again reports he \"sometimes lives with his son\" meaning that the son is sometimes home with the patient but mostly son stays with his girlfriend. Discussed stairs into home, after clarifying stairs vs steps, pt only has 1 step to " navigate to access pouch to then enter the house, no railing. He does not have stairs to do in the home. (On eval pt reported 20 due to miscommunication, goals will be updated to reflect accurate home set up). Pt is no longer selling his house and will plan to return there.   Balance   Sitting Balance (Static) Fair   Sitting Balance (Dynamic) Fair   Standing Balance (Static) Fair   Standing Balance (Dynamic) Fair   Weight Shift Sitting Fair   Weight Shift Standing Fair   Skilled Intervention Verbal Cuing;Compensatory Strategies   Comments w/ FWW   Bed Mobility    Comments up in chair pre/post   Gait Analysis   Gait Level Of Assist Standby Assist   Assistive Device Front Wheel Walker   Distance (Feet)   (100, 70)   Deviation Decreased Base Of Support   Skilled Intervention Verbal Cuing;Compensatory Strategies   Comments Improved tolerance to ambulation, improved balance, did not observe tremulous B LE that was seen in previous session. No LOB.   Functional Mobility   Sit to Stand Standby Assist   Bed, Chair, Wheelchair Transfer Standby Assist   Transfer Method Stand Step   Mobility chair<>walking x2 bouts   Skilled Intervention Verbal Cuing;Sequencing   Comments w/ FWW. improved chair approach/ sit   Short Term Goals    Short Term Goal # 1 Pt will perform supine <> sit with SPV in 6 visits to get in/out of bed   Goal Outcome # 1 Progressing as expected   Short Term Goal # 2 Pt will perform stand step transfers with FWW and Prudencio in 6 visits to get in/out of chair   Goal Outcome # 2 Goal met   Short Term Goal # 3 Pt will ambulate 50ft with FWW and Prudencio in 6 visits to access home environment   Goal Outcome # 3 Goal met, new goal added   Short Term Goal # 3 B Pt will ambulate 100ft with LRAD and SPV in 6 visits to access home environment   Short Term Goal # 4 Pt will ascend/descend 1 step w/o railing and SPV in 6 visits to safely enter/exit home   Goal Outcome # 4 Goal not met   Supervising Physical Therapist (PTA  Treatments Only)   Supervising Physical Therapist Valentina Chang

## 2024-11-06 NOTE — PROGRESS NOTES
Hospital Medicine Daily Progress Note    Date of Service  11/6/2024    Chief Complaint  Jethro Cook is a 49 y.o. male admitted 10/30/2024 with alcohol withdrawal, encephalopathy    Hospital Course  49 y.o. male who presented 10/30/2024 with a history of ETOH abuse, HLD, tobacco use, CAD.  Found down by family last seen 24hrs previously.  Intubated by EMS.  BAL >400, , LA 7.9, , core temp 27.5c.  extubated 10/29. Patient treated for alcohol withdrawal which has resolved. Hospitalization notable for C diff infection, patient started on oral vancomycin with improvement of diarrhea. PT/OT recommend post acute services. Patient is medically cleared, pending SNF.     Interval Problem Update  No acute events overnight.  Patient feeling well, diarrhea improving.  Continue vancomycin for c diff infection.  Patient ambulating with walker, feels a little shaky on his feet.  Patient denied by all SNFs, unable to get him to [facility] SNF too.  Patient without post acute discharge options, plan to discharge patient to home.  Walker and outpatient PT/OT order/referrals placed.  Will plan to discharge patient to home tomorrow if continues to ambulate safely.        I have discussed this patient's plan of care and discharge plan at IDT rounds today with Case Management, Nursing, Nursing leadership, and other members of the IDT team.    Consultants/Specialty  Critical care    Code Status  Full Code    Disposition  Medically Cleared  I have placed the appropriate orders for post-discharge needs.    Review of Systems  Review of Systems   Unable to perform ROS: Mental acuity   Constitutional:  Negative for chills and fever.   Eyes:  Negative for blurred vision and double vision.   Respiratory:  Negative for cough, hemoptysis and wheezing.    Cardiovascular:  Negative for chest pain, palpitations, claudication, leg swelling and PND.   Gastrointestinal:  Positive for diarrhea. Negative for heartburn, nausea and vomiting.    Genitourinary:  Negative for hematuria and urgency.   Musculoskeletal:  Negative for back pain and myalgias.   Skin:  Negative for rash.   Neurological:  Negative for dizziness and headaches.   Endo/Heme/Allergies:  Does not bruise/bleed easily.   Psychiatric/Behavioral:  Negative for depression.         Physical Exam  Temp:  [36.1 °C (97 °F)-37.1 °C (98.8 °F)] 36.6 °C (97.9 °F)  Pulse:  [] 100  Resp:  [20] 20  BP: (106-130)/(69-89) 114/77  SpO2:  [91 %-96 %] 95 %    Physical Exam  Vitals and nursing note reviewed.   Constitutional:       Appearance: He is ill-appearing.   HENT:      Head: Normocephalic.   Eyes:      General:         Right eye: No discharge.         Left eye: No discharge.      Conjunctiva/sclera: Conjunctivae normal.   Cardiovascular:      Rate and Rhythm: Regular rhythm. Tachycardia present.      Pulses: Normal pulses.      Heart sounds: Normal heart sounds.   Pulmonary:      Effort: Pulmonary effort is normal.      Breath sounds: Normal breath sounds.   Abdominal:      General: Bowel sounds are normal. There is no distension.      Tenderness: There is no guarding.   Musculoskeletal:         General: Normal range of motion.      Cervical back: Normal range of motion and neck supple.      Right lower leg: No edema.      Left lower leg: No edema.   Skin:     General: Skin is warm.      Capillary Refill: Capillary refill takes less than 2 seconds.   Neurological:      General: No focal deficit present.      Mental Status: He is alert.      Comments: Partially oriented, able to move all his extremities.   Psychiatric:         Mood and Affect: Mood normal.         Fluids    Intake/Output Summary (Last 24 hours) at 11/6/2024 1317  Last data filed at 11/6/2024 0745  Gross per 24 hour   Intake 1000 ml   Output 800 ml   Net 200 ml        Laboratory  Recent Labs     11/04/24  0426 11/05/24  0300   WBC 5.7 6.0   RBC 3.18* 3.19*   HEMOGLOBIN 11.0* 11.0*   HEMATOCRIT 33.7* 33.6*   .0* 105.3*    MCH 34.6* 34.5*   MCHC 32.6 32.7   RDW 58.9* 57.4*   PLATELETCT 65* 87*   MPV 11.3 10.7     Recent Labs     11/04/24  0426   SODIUM 138   POTASSIUM 3.6   CHLORIDE 100   CO2 25   GLUCOSE 116*   BUN 21   CREATININE 0.91   CALCIUM 8.7               Recent Labs     11/05/24  0300   TRIGLYCERIDE 101       Imaging  DX-CHEST-PORTABLE (1 VIEW)   Final Result   Impression:      1. Shallow breath post extubation.      2. Mild infiltrates. No effusions.                     DX-CHEST-PORTABLE (1 VIEW)   Final Result      Bilateral infiltrates, left worse than right.      EC-ECHOCARDIOGRAM COMPLETE W/ CONT   Final Result      DX-CHEST-LIMITED (1 VIEW)   Final Result         1. Interval placement of a right internal jugular central venous access catheter terminating over the superior vena cava. No postprocedure visible pneumothorax.   2. Adjustment of the endotracheal and nasogastric tubes, now appropriately positioned.   3. Resolution of gastric distention with air.      CT-LSPINE W/O PLUS RECONS   Final Result      1. No acute osseous abnormality involving the lumbar spine.   2. L5-S1 degenerative disc disease and facet arthrosis.   3. For imaging findings regarding the remainder of the abdomen and pelvis, please see CT of the chest, abdomen and pelvis performed the same day.      CT-TSPINE W/O PLUS RECONS   Final Result      1. No acute posttraumatic imaging findings in the thoracic spine.   2. Appropriate positions of the endotracheal and nasogastric tubes.   3. For the imaging findings in the remainder of the chest and abdomen, please see CT chest, abdomen and pelvis performed the same day.      CT-CHEST,ABDOMEN,PELVIS WITH   Final Result      1.  BILATERAL lower lobe atelectasis. Superimposed pneumonia not excluded   2.  Edema in the retroperitoneum could indicate pancreatitis, gastritis or duodenitis   3.  Hepatic steatosis   4.  Trace ascites   5.  LEFT inguinal hernia contains fat   6.  Thyroid gland is enlarged and  heterogenous, recommend correlation with laboratory studies and ultrasound when clinically appropriate      CT-CSPINE WITHOUT PLUS RECONS   Final Result      1.  No acute abnormality identified.   2.  Multilevel multifactorial degenerative changes   3.  Heterogeneous enlarged thyroid, recommend correlation with laboratory studies and ultrasound when clinically appropriate      CT-MAXILLOFACIAL W/O PLUS RECONS   Final Result      1. No acute facial bone fracture.   2. The lens of the left globe is displaced posteriorly into the vitreous humor, best seen on series 9 image 108. It was appropriately positioned on the prior exam performed on 1/28/2024.   3. Intracranial atherosclerotic calcifications.      CT-HEAD W/O   Final Result   Addendum (preliminary) 1 of 1   LEFT globe injury with displaced LEFT lung is better seen on the    maxillofacial CT performed at the same time      Final      No acute intracranial abnormality.            US-ABDOMEN F.A.S.T. LTD (FOR ED USE ONLY)   Final Result      No free fluid seen in all 4 quadrants.      Negative FAST scan.            DX-CHEST-LIMITED (1 VIEW)   Final Result         1. The endotracheal tube terminates at the lorenza; withdrawal 4 cm is recommended.   2. Advancement of the nasogastric tube 20 cm is recommended. There is gastric distention with air.   3. Low lung volumes.      DX-PELVIS-1 OR 2 VIEWS   Final Result      No           Assessment/Plan  * Acute encephalopathy- (present on admission)  Assessment & Plan  Acute alcohol intoxication with blood level greater than 400, and hypothermia.  Now that these issues have been addressed, the patient is awake alert and interactive  Partially oriented  Continue close monitoring  No focal weakness  Continue monitoring, patient appears to be more oriented no focal weakness    Back to  baseline.     Thrombocytopenia (HCC)  Assessment & Plan  Hold the Lovenox  Platelet count 49 today. Stable.     C. difficile diarrhea  Assessment  & Plan  Started on oral vancomycin  Continue isolation per protocol    Continue po vanco  Diarrhea improved.     Alcohol withdrawal syndrome without complication (HCC)  Assessment & Plan  Patient is starting to exhibit signs of withdrawal  Start scheduled Librium for seizure prophylaxis  Start benzodiazepine withdrawal program  Seizure precautions  Close monitoring of mental status and airway  Improved.     Macrocytic anemia- (present on admission)  Assessment & Plan  Likely related to the patient's alcohol intake  IV thiamine and p.o. folate  Dietary consultation to improve nutritional status    Hyponatremia- (present on admission)  Assessment & Plan  Insetting of glucose greater than 600.  Corrected into the normal range keeping glucose in mind  Na 138    Electrolyte abnormality- (present on admission)  Assessment & Plan  Following and replacing daily magnesium, phosphorus, potassium.  Monitoring daily electrolytes    Lactic acidosis- (present on admission)  Assessment & Plan  Alcohol versus malnutrition versus hypovolemic versus seizure  Resolved    Hypothermia- (present on admission)  Assessment & Plan  Patient was hypothermic after being altered and found down  Now s/p rewarming    Shock (HCC)- (present on admission)  Assessment & Plan  Patient came in hypothermic  Has responded to fluid resuscitation and rewarming     Blood pressure improved, will restart his blood pressure medications with close monitoring    Hyperglycemia- (present on admission)  Assessment & Plan  Glycohemoglobin 4.8  Question stress response  Continue to monitor    Acute alcoholic intoxication with complication (HCC)- (present on admission)  Assessment & Plan  Blood level greater than 400  Encourage alcohol cessation  IV thiamine load  P.o. folate and MVI  Starting to withdrawal  Continue monitoring CIWA protocol    improved      Acute respiratory failure with hypoxia (HCC)- (present on admission)  Assessment & Plan  Patient intubated for  airway protection in setting of altered mentation.  Now that the patient is thinking clearly, and with a normal level of consciousness, he is protecting his airway and oxygenating.  Continue O2 and RT protocols  Mobilize  Encourage smoking cessation    Continue weaning off oxygen  Start on IV Lasix  Continue monitoring I's and O's    Continue weaning off o2.     Primary hypertension- (present on admission)  Assessment & Plan  As outpatient was maintained on losartan 100 mg daily, Coreg 6.25 mg twice daily, and amlodipine 10 mg daily  Currently going through withdrawals so titration of his chronic outpatient medications is best left to a later time when he is no longer in DTs    Improved  Continue monitoring  Restarted losartan 50 mg daily    Transaminitis- (present on admission)  Assessment & Plan  Pattern consistent with alcohol which fits with the patient's history  CT imaging shows hepatic steatosis, no other pathology seen  LFTs are trending down  Encourage alcohol cessation  Continue to monitor, slowly trending down    NSTEMI (non-ST elevated myocardial infarction) (HCC)- (present on admission)  Assessment & Plan  Insetting of acute hypothermia.  EKG was nonischemic  Echo was negative for wall motion abnormalities         VTE prophylaxis: Lovenox, monitoring platelet count    I have performed a physical exam and reviewed and updated ROS and Plan today (11/6/2024). In review of yesterday's note (11/5/2024), there are no changes except as documented above.

## 2024-11-06 NOTE — CARE PLAN
Problem: Skin Integrity  Goal: Skin integrity is maintained or improved  Outcome: Progressing     Problem: Fall Risk  Goal: Patient will remain free from falls  Outcome: Progressing     Problem: Optimal Care for Alcohol Withdrawal  Goal: Optimal Care for the alcohol withdrawal patient  Outcome: Progressing   The patient is Watcher - Medium risk of patient condition declining or worsening    Shift Goals  Clinical Goals: neuro checks  Patient Goals: rest  Family Goals: elise    Progress made toward(s) clinical / shift goals:  progressing    Patient is not progressing towards the following goals:

## 2024-11-07 VITALS
TEMPERATURE: 97.9 F | WEIGHT: 227.29 LBS | RESPIRATION RATE: 20 BRPM | BODY MASS INDEX: 33.67 KG/M2 | HEART RATE: 84 BPM | HEIGHT: 69 IN | SYSTOLIC BLOOD PRESSURE: 123 MMHG | DIASTOLIC BLOOD PRESSURE: 75 MMHG | OXYGEN SATURATION: 98 %

## 2024-11-07 LAB
ANION GAP SERPL CALC-SCNC: 9 MMOL/L (ref 7–16)
BUN SERPL-MCNC: 18 MG/DL (ref 8–22)
CALCIUM SERPL-MCNC: 9.2 MG/DL (ref 8.5–10.5)
CHLORIDE SERPL-SCNC: 104 MMOL/L (ref 96–112)
CO2 SERPL-SCNC: 23 MMOL/L (ref 20–33)
CREAT SERPL-MCNC: 0.74 MG/DL (ref 0.5–1.4)
ERYTHROCYTE [DISTWIDTH] IN BLOOD BY AUTOMATED COUNT: 54.7 FL (ref 35.9–50)
GFR SERPLBLD CREATININE-BSD FMLA CKD-EPI: 110 ML/MIN/1.73 M 2
GLUCOSE SERPL-MCNC: 104 MG/DL (ref 65–99)
HCT VFR BLD AUTO: 32.6 % (ref 42–52)
HGB BLD-MCNC: 10.5 G/DL (ref 14–18)
MCH RBC QN AUTO: 34.1 PG (ref 27–33)
MCHC RBC AUTO-ENTMCNC: 32.2 G/DL (ref 32.3–36.5)
MCV RBC AUTO: 105.8 FL (ref 81.4–97.8)
PLATELET # BLD AUTO: 226 K/UL (ref 164–446)
PMV BLD AUTO: 10.7 FL (ref 9–12.9)
POTASSIUM SERPL-SCNC: 4.1 MMOL/L (ref 3.6–5.5)
RBC # BLD AUTO: 3.08 M/UL (ref 4.7–6.1)
SODIUM SERPL-SCNC: 136 MMOL/L (ref 135–145)
WBC # BLD AUTO: 7.3 K/UL (ref 4.8–10.8)

## 2024-11-07 PROCEDURE — 99239 HOSP IP/OBS DSCHRG MGMT >30: CPT | Performed by: STUDENT IN AN ORGANIZED HEALTH CARE EDUCATION/TRAINING PROGRAM

## 2024-11-07 PROCEDURE — 97535 SELF CARE MNGMENT TRAINING: CPT

## 2024-11-07 PROCEDURE — 700102 HCHG RX REV CODE 250 W/ 637 OVERRIDE(OP): Performed by: HOSPITALIST

## 2024-11-07 PROCEDURE — 85027 COMPLETE CBC AUTOMATED: CPT

## 2024-11-07 PROCEDURE — 36415 COLL VENOUS BLD VENIPUNCTURE: CPT

## 2024-11-07 PROCEDURE — 700101 HCHG RX REV CODE 250: Performed by: HOSPITALIST

## 2024-11-07 PROCEDURE — A9270 NON-COVERED ITEM OR SERVICE: HCPCS | Performed by: STUDENT IN AN ORGANIZED HEALTH CARE EDUCATION/TRAINING PROGRAM

## 2024-11-07 PROCEDURE — 700111 HCHG RX REV CODE 636 W/ 250 OVERRIDE (IP): Performed by: HOSPITALIST

## 2024-11-07 PROCEDURE — A9270 NON-COVERED ITEM OR SERVICE: HCPCS | Performed by: HOSPITALIST

## 2024-11-07 PROCEDURE — 700102 HCHG RX REV CODE 250 W/ 637 OVERRIDE(OP): Performed by: STUDENT IN AN ORGANIZED HEALTH CARE EDUCATION/TRAINING PROGRAM

## 2024-11-07 PROCEDURE — 80048 BASIC METABOLIC PNL TOTAL CA: CPT

## 2024-11-07 RX ORDER — VANCOMYCIN HYDROCHLORIDE 125 MG/1
125 CAPSULE ORAL 4 TIMES DAILY
Qty: 20 CAPSULE | Refills: 0 | Status: ACTIVE | OUTPATIENT
Start: 2024-11-07 | End: 2024-11-07

## 2024-11-07 RX ORDER — VANCOMYCIN HYDROCHLORIDE 125 MG/1
125 CAPSULE ORAL 4 TIMES DAILY
Qty: 20 CAPSULE | Refills: 0 | Status: ACTIVE | OUTPATIENT
Start: 2024-11-07 | End: 2024-11-12

## 2024-11-07 RX ADMIN — AMLODIPINE BESYLATE 10 MG: 10 TABLET ORAL at 06:06

## 2024-11-07 RX ADMIN — CARVEDILOL 6.25 MG: 6.25 TABLET, FILM COATED ORAL at 08:08

## 2024-11-07 RX ADMIN — Medication 125 MG: at 06:06

## 2024-11-07 RX ADMIN — Medication 125 MG: at 00:03

## 2024-11-07 RX ADMIN — NICOTINE TRANSDERMAL SYSTEM 21 MG: 21 PATCH, EXTENDED RELEASE TRANSDERMAL at 06:06

## 2024-11-07 RX ADMIN — LOSARTAN POTASSIUM 50 MG: 50 TABLET, FILM COATED ORAL at 06:06

## 2024-11-07 ASSESSMENT — COGNITIVE AND FUNCTIONAL STATUS - GENERAL
DRESSING REGULAR UPPER BODY CLOTHING: A LITTLE
PERSONAL GROOMING: A LITTLE
HELP NEEDED FOR BATHING: A LITTLE
DRESSING REGULAR LOWER BODY CLOTHING: A LITTLE
DAILY ACTIVITIY SCORE: 19
TOILETING: A LITTLE
SUGGESTED CMS G CODE MODIFIER DAILY ACTIVITY: CK

## 2024-11-07 ASSESSMENT — FIBROSIS 4 INDEX
FIB4 SCORE: 8.4
FIB4 SCORE: 8.4

## 2024-11-07 ASSESSMENT — PAIN DESCRIPTION - PAIN TYPE: TYPE: ACUTE PAIN

## 2024-11-07 NOTE — THERAPY
Occupational Therapy   Daily Treatment     Patient Name: Jethro Cook  Age:  49 y.o., Sex:  male  Medical Record #: [id number]  Today's Date: 11/7/2024     Precautions  Precautions: Fall Risk  Comments: dejuan YANCEY pupil    Assessment    Patient seen for OT treatment. Pt required SBA to perform functional mobility, SBA for standing handwashing/oral care, and SBA for toilet transfer. Discussed shower safety, DME recommendations, and the role of OT. Pt current functional performance limited by impaired activity tolerance, impaired balance, and generalized weakness. Pt will continue to benefit from skilled OT while admitted to acute care.     Plan    Occupational Therapy Initial Treatment Plan   Treatment Interventions: Self Care / Activities of Daily Living, Adaptive Equipment, Neuro Re-Education / Balance, Therapeutic Exercises, Therapeutic Activity  Treatment Frequency: 3 Times per Week  Duration: Until Therapy Goals Met    DC Equipment Recommendations: Tub / Shower Seat  Discharge Recommendations: Recommend home health for continued occupational therapy services      Objective     11/07/24 0857   Pain   Pain Scales 0 to 10 Scale    Pain 0 - 10 Group   Therapist Pain Assessment Post Activity Pain Same as Prior to Activity;Nurse Notified  (not rated, agreeable to session)   Non Verbal Descriptors   Non Verbal Scale  Calm   Cognition    Cognition / Consciousness WDL   Level of Consciousness Alert   Comments Pleasant and cooperative   Other Treatments   Other Treatments Provided Pt reported that his son will be home sometimes upon d/c. Pt's son can assist when available. Pt has a walkin shower and a tub, discussed shower chair recommendation as well as shower safety for d/c. Also discussed safe methods for tub transfers, however pt declined to practice. Pt also reported plans to seek additional resources for alcohol cessation.  notified.   Balance   Sitting Balance (Static) Fair   Sitting Balance (Dynamic)  Fair   Standing Balance (Static) Fair   Standing Balance (Dynamic) Fair -   Weight Shift Sitting Fair   Weight Shift Standing Fair   Skilled Intervention Verbal Cuing   Comments w/ FWW, no overt LOB   Bed Mobility    Supine to Sit Supervised   Sit to Supine   (up to chair post)   Scooting Supervised   Rolling Supervised   Skilled Intervention Verbal Cuing;Tactile Cuing;Sequencing;Facilitation   Comments HOB slightly elevated, no use of rails   Activities of Daily Living   Grooming Standby Assist;Standing  (washed hands, brushed teeth standing at the sink)   Toileting   (toilet transfer only)   Skilled Intervention Verbal Cuing;Facilitation   Functional Mobility   Sit to Stand Standby Assist   Bed, Chair, Wheelchair Transfer Standby Assist   Toilet Transfers Standby Assist   Transfer Method Stand Step   Mobility EOB>bathroom>laps around room>chair   Skilled Intervention Verbal Cuing;Tactile Cuing;Sequencing;Facilitation   Comments w/ FWW   Visual Perception   Visual Perception  Not Tested   Activity Tolerance   Sitting in Chair up to chair post   Sitting Edge of Bed <5 min   Standing >5 min   Comments limited by weakness   Patient / Family Goals   Patient / Family Goal #1 to get stronger   Goal #1 Outcome Progressing as expected   Short Term Goals   Short Term Goal # 1 Pt will perform ADL transfer w/ supv   Goal Outcome # 1 Progressing as expected   Short Term Goal # 2 Pt will perform standing g/h w/ supv   Goal Outcome # 2 Progressing as expected   Short Term Goal # 3 Pt will perform LB dressing w/ supv and AE PRN   Goal Outcome # 3 Progressing as expected   Education Group   Education Provided Role of Occupational Therapist;Activities of Daily Living;Pathology of bedrest;Adaptive Equipment;Home Safety   Role of Occupational Therapist Patient Response Patient;Acceptance;Explanation;Verbal Demonstration   Home Safety Patient Response Patient;Acceptance;Explanation;Verbal Demonstration   ADL Patient Response  Patient;Acceptance;Explanation;Demonstration;Verbal Demonstration;Action Demonstration   Adaptive Equipment Patient Response Patient;Acceptance;Explanation;Demonstration;Verbal Demonstration;Action Demonstration   Pathology of Bedrest Patient Response Patient;Acceptance;Explanation;Demonstration;Verbal Demonstration;Action Demonstration

## 2024-11-07 NOTE — PROGRESS NOTES
PIVs removed. Discharge instructions reviewed with patient. All questions answered. [facility] by JUAREZ Franklin. Discharge paperwork placed in bin.

## 2024-11-07 NOTE — CARE PLAN
The patient is Stable - Low risk of patient condition declining or worsening    Shift Goals  Clinical Goals: Q4 neuros, monitor respiratory status, moitor vitals and labs  Patient Goals: rest  Family Goals: elise    Progress made toward(s) clinical / shift goals:        Problem: Knowledge Deficit - Standard  Goal: Patient and family/care givers will demonstrate understanding of plan of care, disease process/condition, diagnostic tests and medications  Outcome: Progressing  Note: Updated pt on poc, all questions answered. Re-enforce education as needed.      Problem: Skin Integrity  Goal: Skin integrity is maintained or improved  Outcome: Progressing  Note: Pt turns self independently from side to side and makes frequent changes in position. Assess and monitor for signs of skin breakdown. Pt on JULIANNA mattress and heels floated with pillows.      Problem: Pain - Standard  Goal: Alleviation of pain or a reduction in pain to the patient’s comfort goal  Outcome: Progressing     Problem: Fall Risk  Goal: Patient will remain free from falls  Outcome: Progressing  Note: Bed alarm on and connected correctly. Pt wearing treaded slipper socks, bed rails up x 2, bed in low locked position, call light and belongings within reach. Pt educated on need to call for assistance when getting out of bed. Hourly rounding currently in place and plan of care ongoing.

## 2024-11-07 NOTE — DISCHARGE SUMMARY
Discharge Summary    CHIEF COMPLAINT ON ADMISSION  No chief complaint on file.      Reason for Admission  EMS     Admission Date  10/30/2024    CODE STATUS  Prior    HPI & HOSPITAL COURSE  49 y.o. male who presented 10/30/2024 with a history of ETOH abuse, HLD, tobacco use, CAD.  Found down by family last seen 24hrs previously.  Intubated by EMS.  BAL >400, , LA 7.9, , core temp 27.5c.  extubated 10/29. Patient treated for alcohol withdrawal which has resolved. Hospitalization notable for C diff infection, patient started on oral vancomycin with improvement of diarrhea. PT/OT recommend post acute services. Patient unfortunately denied by all SNFs. No home health in patients local area. Patient was rehabbed in house and is able to ambulate with walker. Outpatient referral for PT/OT placed for patient, walker given to patient on discharge. Transportation home also provided. Patient is to follow up with his PCP.    Therefore, he is discharged in fair and stable condition to home with close outpatient follow-up.    The patient met 2-midnight criteria for an inpatient stay at the time of discharge.    Discharge Date  11/7/2024    FOLLOW UP ITEMS POST DISCHARGE  Take medications as prescribed.  Follow up with PCP.    DISCHARGE DIAGNOSES  Principal Problem:    Acute encephalopathy (POA: Yes)  Active Problems:    NSTEMI (non-ST elevated myocardial infarction) (HCC) (POA: Yes)      Overview: [facility]    Transaminitis (POA: Yes)    Primary hypertension (POA: Yes)    Pure hypercholesterolemia (POA: Yes)    Acute respiratory failure with hypoxia (HCC) (POA: Yes)    Acute alcoholic intoxication with complication (HCC) (POA: Yes)    Hyperglycemia (POA: Yes)    Shock (HCC) (POA: Yes)    Hypothermia (POA: Yes)    Lactic acidosis (POA: Yes)    Electrolyte abnormality (POA: Yes)    Hyponatremia (POA: Yes)    Macrocytic anemia (POA: Yes)    Alcohol withdrawal syndrome without complication (HCC) (POA: Unknown)    C.  difficile diarrhea (POA: Unknown)    Thrombocytopenia (HCC) (POA: Unknown)  Resolved Problems:    * No resolved hospital problems. *      FOLLOW UP  No future appointments.  [facility] - [facility] - -Directed Care  [address] [place] [id number]  [phone]  Follow up        MEDICATIONS ON DISCHARGE     Medication List        START taking these medications        Instructions   vancomycin 125 MG capsule  Commonly known as: Vancocin   Take 1 Capsule by mouth 4 times a day for 5 days.  Dose: 125 mg            CONTINUE taking these medications        Instructions   amLODIPine 10 MG Tabs  Commonly known as: Norvasc   Take 1 Tablet by mouth every day.  Dose: 10 mg     atorvastatin 40 MG Tabs  Commonly known as: Lipitor   Take 1 Tablet by mouth every evening.  Dose: 40 mg     carvedilol 6.25 MG Tabs  Commonly known as: Coreg   Take 1 Tablet by mouth 2 times a day with meals.  Dose: 6.25 mg     clopidogrel 75 MG Tabs  Commonly known as: Plavix   Take 1 Tablet by mouth every day.  Dose: 75 mg     cyanocobalamin 500 MCG Tabs  Commonly known as: Vitamin B-12   Take 500 mcg by mouth every day.  Dose: 500 mcg     gabapentin 300 MG Caps  Commonly known as: Neurontin   Take 300 mg by mouth 3 times a day as needed (MOOD/PAIN).  Dose: 300 mg     hydrOXYzine HCl 25 MG Tabs  Commonly known as: Atarax   Take 25 mg by mouth at bedtime.  Dose: 25 mg     losartan 100 MG Tabs  Commonly known as: Cozaar   Take 100 mg by mouth every day.  Dose: 100 mg     Melatonin 10 MG Tabs   Take 10 mg by mouth at bedtime.  Dose: 10 mg     naltrexone 50 MG Tabs  Commonly known as: Depade   Take 50 mg by mouth every day.  Dose: 50 mg     nicotine polacrilex 2 MG lozenge  Commonly known as: Commit   Place 2 mg into mouth between cheek and gum every 2 hours as needed (nicotine dependence).  Dose: 2 mg     PROzac 40 MG capsule  Generic drug: fluoxetine   Take 40 mg by mouth every day.  Dose:  40 mg     traZODone 100 MG Tabs  Commonly known as: Desyrel   Take 100 mg by mouth at bedtime.  Dose: 100 mg     vitamin D 1000 Unit Tabs  Commonly known as: Cholecalciferol   Take 1,000 Units by mouth every day.  Dose: 1,000 Units              Allergies  Allergies   Allergen Reactions    Lisinopril Unspecified     Mood swings        DIET  No orders of the defined types were placed in this encounter.      ACTIVITY  As tolerated.  Weight bearing as tolerated    CONSULTATIONS  none    PROCEDURES  none    LABORATORY  Lab Results   Component Value Date    SODIUM 136 11/07/2024    POTASSIUM 4.1 11/07/2024    CHLORIDE 104 11/07/2024    CO2 23 11/07/2024    GLUCOSE 104 (H) 11/07/2024    BUN 18 11/07/2024    CREATININE 0.74 11/07/2024        Lab Results   Component Value Date    WBC 7.3 11/07/2024    HEMOGLOBIN 10.5 (L) 11/07/2024    HEMATOCRIT 32.6 (L) 11/07/2024    PLATELETCT 226 11/07/2024        Total time of the discharge process exceeds 31 minutes.

## 2024-11-07 NOTE — PROGRESS NOTES
Bedside report received from off going RN/tech: Kuldip, assumed care of patient.     Fall Risk Score: HIGH RISK  Fall risk interventions in place: Place yellow fall risk ID band on patient, Provide patient/family education based on risk assessment, Educate patient/family to call staff for assistance when getting out of bed, Place fall precaution signage outside patient door, Place patient in room close to nursing station, Utilize bed/chair fall alarm, Notify charge of high risk for huddle, and Bed alarm connected correctly  Bed type: Low air loss (Isidro Score less than 17 interventions in place)  Patient on cardiac monitor: No   IVF/IV medications: Not Applicable   Oxygen: How many liters 3L, Traced the line to wall oxygen, and No oxygen tank in room  Bedside sitter: Not Applicable   Isolation: Not applicable    Pt saturating at 87% Room Air. This RN put pt on 3L O2 via nasal cannula with saturations up to 96%. Pt A&Ox4 and denies any pain at this time. Call light and belongings are within reach.

## 2024-11-07 NOTE — PROGRESS NOTES
Bedside report received from off going RN/tech: Julieth, assumed care of patient.     Fall Risk Score: HIGH RISK  Fall risk interventions in place: Place yellow fall risk ID band on patient, Provide patient/family education based on risk assessment, Educate patient/family to call staff for assistance when getting out of bed, Place fall precaution signage outside patient door, Place patient in room close to nursing station, Utilize bed/chair fall alarm, Notify charge of high risk for huddle, and Bed alarm connected correctly  Bed type: Low air loss (Isidro Score less than 17 interventions in place)  Patient on cardiac monitor: No   IVF/IV medications: Not Applicable   Oxygen: Room Air  Bedside sitter: Not Applicable   Isolation: Isolation precautions in place Special Contact

## 2024-11-07 NOTE — DISCHARGE PLANNING
Case Management Discharge Planning    Admission Date: 10/30/2024  GMLOS: 4.9  ALOS: 8    6-Clicks ADL Score: 19  6-Clicks Mobility Score: 18      Anticipated Discharge Dispo: Discharge Disposition: Discharged to home/self care (01)    DME Needed: Yes    DME Ordered: Yes    Action(s) Taken: LMSW working on transport home for pt. LMSW spoke with pt and pt requested his meds be sent to the [facility] in [place]. LMSW spoke with pt's sister to see if she can possibly help with transport. Sister to see how much an uber would cost and will give LMSW a call back on if she can help pt or not.